# Patient Record
Sex: FEMALE | Race: WHITE | NOT HISPANIC OR LATINO
[De-identification: names, ages, dates, MRNs, and addresses within clinical notes are randomized per-mention and may not be internally consistent; named-entity substitution may affect disease eponyms.]

---

## 2017-09-12 DIAGNOSIS — Z86.79 PERSONAL HISTORY OF OTHER DISEASES OF THE CIRCULATORY SYSTEM: ICD-10-CM

## 2017-09-12 DIAGNOSIS — Z83.3 FAMILY HISTORY OF DIABETES MELLITUS: ICD-10-CM

## 2017-09-12 DIAGNOSIS — G40.409 OTHER GENERALIZED EPILEPSY AND EPILEPTIC SYNDROMES, NOT INTRACTABLE, W/OUT STATUS EPILEPTICUS: ICD-10-CM

## 2017-09-12 DIAGNOSIS — Z86.39 PERSONAL HISTORY OF OTHER ENDOCRINE, NUTRITIONAL AND METABOLIC DISEASE: ICD-10-CM

## 2017-09-12 DIAGNOSIS — Z56.0 UNEMPLOYMENT, UNSPECIFIED: ICD-10-CM

## 2017-09-12 DIAGNOSIS — F17.200 NICOTINE DEPENDENCE, UNSPECIFIED, UNCOMPLICATED: ICD-10-CM

## 2017-09-12 SDOH — ECONOMIC STABILITY - INCOME SECURITY: UNEMPLOYMENT, UNSPECIFIED: Z56.0

## 2017-09-13 ENCOUNTER — APPOINTMENT (OUTPATIENT)
Dept: ELECTROPHYSIOLOGY | Facility: CLINIC | Age: 37
End: 2017-09-13
Payer: MEDICAID

## 2017-09-13 VITALS
SYSTOLIC BLOOD PRESSURE: 118 MMHG | WEIGHT: 172 LBS | HEIGHT: 59 IN | DIASTOLIC BLOOD PRESSURE: 72 MMHG | BODY MASS INDEX: 34.68 KG/M2

## 2017-09-13 PROCEDURE — 99215 OFFICE O/P EST HI 40 MIN: CPT

## 2017-09-13 PROCEDURE — 93000 ELECTROCARDIOGRAM COMPLETE: CPT

## 2017-12-19 ENCOUNTER — APPOINTMENT (OUTPATIENT)
Dept: MRI IMAGING | Facility: CLINIC | Age: 37
End: 2017-12-19

## 2017-12-21 DIAGNOSIS — Z83.49 FAMILY HISTORY OF OTHER ENDOCRINE, NUTRITIONAL AND METABOLIC DISEASES: ICD-10-CM

## 2017-12-27 ENCOUNTER — APPOINTMENT (OUTPATIENT)
Dept: ELECTROPHYSIOLOGY | Facility: CLINIC | Age: 37
End: 2017-12-27
Payer: MEDICAID

## 2017-12-27 VITALS
BODY MASS INDEX: 34.34 KG/M2 | DIASTOLIC BLOOD PRESSURE: 80 MMHG | WEIGHT: 170 LBS | SYSTOLIC BLOOD PRESSURE: 100 MMHG | HEART RATE: 54 BPM

## 2017-12-27 PROCEDURE — 93290 INTERROG DEV EVAL ICPMS IP: CPT

## 2017-12-27 PROCEDURE — 99215 OFFICE O/P EST HI 40 MIN: CPT | Mod: 25

## 2017-12-27 PROCEDURE — 93000 ELECTROCARDIOGRAM COMPLETE: CPT

## 2018-04-17 ENCOUNTER — OUTPATIENT (OUTPATIENT)
Dept: OUTPATIENT SERVICES | Facility: HOSPITAL | Age: 38
LOS: 1 days | End: 2018-04-17
Payer: MEDICAID

## 2018-04-17 VITALS
HEIGHT: 59 IN | SYSTOLIC BLOOD PRESSURE: 125 MMHG | DIASTOLIC BLOOD PRESSURE: 59 MMHG | WEIGHT: 167.99 LBS | TEMPERATURE: 98 F | RESPIRATION RATE: 18 BRPM | HEART RATE: 80 BPM | OXYGEN SATURATION: 95 %

## 2018-04-17 DIAGNOSIS — Z98.891 HISTORY OF UTERINE SCAR FROM PREVIOUS SURGERY: Chronic | ICD-10-CM

## 2018-04-17 DIAGNOSIS — Z01.810 ENCOUNTER FOR PREPROCEDURAL CARDIOVASCULAR EXAMINATION: ICD-10-CM

## 2018-04-17 LAB
ANION GAP SERPL CALC-SCNC: 10 MMOL/L — SIGNIFICANT CHANGE UP (ref 5–17)
APTT BLD: 30.9 SEC — SIGNIFICANT CHANGE UP (ref 27.5–37.4)
BLD GP AB SCN SERPL QL: SIGNIFICANT CHANGE UP
BUN SERPL-MCNC: 13 MG/DL — SIGNIFICANT CHANGE UP (ref 8–20)
CALCIUM SERPL-MCNC: 8.6 MG/DL — SIGNIFICANT CHANGE UP (ref 8.6–10.2)
CHLORIDE SERPL-SCNC: 102 MMOL/L — SIGNIFICANT CHANGE UP (ref 98–107)
CO2 SERPL-SCNC: 25 MMOL/L — SIGNIFICANT CHANGE UP (ref 22–29)
CREAT SERPL-MCNC: 0.6 MG/DL — SIGNIFICANT CHANGE UP (ref 0.5–1.3)
GLUCOSE SERPL-MCNC: 88 MG/DL — SIGNIFICANT CHANGE UP (ref 70–115)
HCT VFR BLD CALC: 39.2 % — SIGNIFICANT CHANGE UP (ref 37–47)
HGB BLD-MCNC: 13.2 G/DL — SIGNIFICANT CHANGE UP (ref 12–16)
INR BLD: 1.18 RATIO — HIGH (ref 0.88–1.16)
MAGNESIUM SERPL-MCNC: 1.9 MG/DL — SIGNIFICANT CHANGE UP (ref 1.6–2.6)
MCHC RBC-ENTMCNC: 29.9 PG — SIGNIFICANT CHANGE UP (ref 27–31)
MCHC RBC-ENTMCNC: 33.7 G/DL — SIGNIFICANT CHANGE UP (ref 32–36)
MCV RBC AUTO: 88.7 FL — SIGNIFICANT CHANGE UP (ref 81–99)
PLATELET # BLD AUTO: 150 K/UL — SIGNIFICANT CHANGE UP (ref 150–400)
POTASSIUM SERPL-MCNC: 3.8 MMOL/L — SIGNIFICANT CHANGE UP (ref 3.5–5.3)
POTASSIUM SERPL-SCNC: 3.8 MMOL/L — SIGNIFICANT CHANGE UP (ref 3.5–5.3)
PROTHROM AB SERPL-ACNC: 13 SEC — HIGH (ref 9.8–12.7)
RBC # BLD: 4.42 M/UL — SIGNIFICANT CHANGE UP (ref 4.4–5.2)
RBC # FLD: 13.7 % — SIGNIFICANT CHANGE UP (ref 11–15.6)
SODIUM SERPL-SCNC: 137 MMOL/L — SIGNIFICANT CHANGE UP (ref 135–145)
TYPE + AB SCN PNL BLD: SIGNIFICANT CHANGE UP
WBC # BLD: 8.6 K/UL — SIGNIFICANT CHANGE UP (ref 4.8–10.8)
WBC # FLD AUTO: 8.6 K/UL — SIGNIFICANT CHANGE UP (ref 4.8–10.8)

## 2018-04-17 PROCEDURE — 80048 BASIC METABOLIC PNL TOTAL CA: CPT

## 2018-04-17 PROCEDURE — G0463: CPT

## 2018-04-17 PROCEDURE — 36415 COLL VENOUS BLD VENIPUNCTURE: CPT

## 2018-04-17 PROCEDURE — 93005 ELECTROCARDIOGRAM TRACING: CPT

## 2018-04-17 PROCEDURE — 86901 BLOOD TYPING SEROLOGIC RH(D): CPT

## 2018-04-17 PROCEDURE — 86900 BLOOD TYPING SEROLOGIC ABO: CPT

## 2018-04-17 PROCEDURE — 85027 COMPLETE CBC AUTOMATED: CPT

## 2018-04-17 PROCEDURE — 93010 ELECTROCARDIOGRAM REPORT: CPT

## 2018-04-17 PROCEDURE — 85730 THROMBOPLASTIN TIME PARTIAL: CPT

## 2018-04-17 PROCEDURE — 86850 RBC ANTIBODY SCREEN: CPT

## 2018-04-17 PROCEDURE — 85610 PROTHROMBIN TIME: CPT

## 2018-04-17 PROCEDURE — 83735 ASSAY OF MAGNESIUM: CPT

## 2018-04-17 NOTE — H&P PST ADULT - NEGATIVE CARDIOVASCULAR SYMPTOMS
no paroxysmal nocturnal dyspnea/no chest pain/no peripheral edema/no dyspnea on exertion/no orthopnea/no palpitations no orthopnea/no paroxysmal nocturnal dyspnea/no peripheral edema/no chest pain/no dyspnea on exertion

## 2018-04-17 NOTE — H&P PST ADULT - ASSESSMENT
37 year old woman with history of obesity, HLD, hypertrophic cardiomyopathy (HCM) with outflow tract obstruction, presenting for followup of palpitation and NSVT. On ILR she has had increasingly frequent episodes of NSVT, and she does have a concerning family history. She has not had sustained VT or syncope, and is at intermediate risk for sudden death related to HCM. Plan for SICD implant. 37 year old woman with history of obesity, HLD, hypertrophic cardiomyopathy (HCM) with outflow tract obstruction, presenting for followup of palpitation and NSVT. On ILR she has had increasingly frequent episodes of NSVT, and she does have a concerning family history. She has not had sustained VT or syncope, and is at intermediate risk for sudden death related to HCM.     - Patient screened in for SICD implant. Passed in 2 out of 3 vector  - NPO post midnight day of the procedure.   - Plan for simultaneous loop recorder explant.

## 2018-04-17 NOTE — H&P PST ADULT - FAMILY HISTORY
Mother  Still living? Unknown  Family history of diabetes mellitus, Age at diagnosis: Age Unknown     Father  Still living? No  Family history of heart disease, Age at diagnosis: Age Unknown

## 2018-04-17 NOTE — H&P PST ADULT - HISTORY OF PRESENT ILLNESS
37 year old woman with history of obesity, HLD, hypertrophic cardiomyopathy (HCM) with outflow tract obstruction, presenting for followup of palpitation and NSVT. An Echo in 2013 was consistent with IHSS with significant gradient with valsalva. Last septal thickness reported 1.3cm. She has been treated with beta blockers and has had some improvement. Event monitor in 2014 was normal. She has continued to have palpitation and SOB, as well as episodes of positional dizziness. She underwent ILR implant on 2/24/16, which has recently revealed multiple episodes of NSVT. On recent interrogation the NSVT has been longer lasting, up to 12 beats with cycle length 300ms. This month she has had 2 episodes of rapid NSVT on 12/17 and 12/19 lasting 9 and 12 beats. She notes frequent palpitation, but denies sudden lightheadedness, or syncope. She attempted to have a cardiac MRI, but has not been able to tolerate it. She had a panic attack in the scanner despite sedation. She now says she will only be able to have the scan with anesthesia. She denies any history of syncope, or prior sustained cardiac arrhythmias. She does have two family members with HCM, and her cousin with HCM had a history of syncope and underwent ICD implant. Another uncle had sudden death in his 50s, but apparently autopsy revealed acute MI.   She is very concerned about her risk of sudden death particularly given her young 5 year son.    Stress Test: 4/17/15, normal. Nml EF and wall motion. BP increased from 108//80.   Echo: 7/10/17, LVEF 72%, asymmetric septal hypertrophy c/w HCM with outflow tract obstruction at rest (88mmHg up to 100mmHg post Valsalva) LA 4.4cm, ANGE with mild –mod eccentric MR, trace TR

## 2018-04-20 ENCOUNTER — INPATIENT (INPATIENT)
Facility: HOSPITAL | Age: 38
LOS: 0 days | Discharge: HOME | DRG: 245 | End: 2018-04-21
Attending: STUDENT IN AN ORGANIZED HEALTH CARE EDUCATION/TRAINING PROGRAM | Admitting: STUDENT IN AN ORGANIZED HEALTH CARE EDUCATION/TRAINING PROGRAM
Payer: MEDICAID

## 2018-04-20 ENCOUNTER — TRANSCRIPTION ENCOUNTER (OUTPATIENT)
Age: 38
End: 2018-04-20

## 2018-04-20 VITALS
OXYGEN SATURATION: 97 % | DIASTOLIC BLOOD PRESSURE: 57 MMHG | TEMPERATURE: 98 F | SYSTOLIC BLOOD PRESSURE: 100 MMHG | HEART RATE: 64 BPM | RESPIRATION RATE: 16 BRPM

## 2018-04-20 DIAGNOSIS — I42.2 OTHER HYPERTROPHIC CARDIOMYOPATHY: ICD-10-CM

## 2018-04-20 DIAGNOSIS — Z01.810 ENCOUNTER FOR PREPROCEDURAL CARDIOVASCULAR EXAMINATION: ICD-10-CM

## 2018-04-20 DIAGNOSIS — Z98.891 HISTORY OF UTERINE SCAR FROM PREVIOUS SURGERY: Chronic | ICD-10-CM

## 2018-04-20 LAB
ABO RH CONFIRMATION: SIGNIFICANT CHANGE UP
HCG SERPL-ACNC: <5 MIU/ML — SIGNIFICANT CHANGE UP

## 2018-04-20 PROCEDURE — 33270 INS/REP SUBQ DEFIBRILLATOR: CPT | Mod: Q0

## 2018-04-20 PROCEDURE — 33284: CPT

## 2018-04-20 PROCEDURE — 93010 ELECTROCARDIOGRAM REPORT: CPT

## 2018-04-20 RX ORDER — KETOROLAC TROMETHAMINE 30 MG/ML
30 SYRINGE (ML) INJECTION ONCE
Qty: 0 | Refills: 0 | Status: DISCONTINUED | OUTPATIENT
Start: 2018-04-20 | End: 2018-04-21

## 2018-04-20 RX ORDER — CEFAZOLIN SODIUM 1 G
2000 VIAL (EA) INJECTION EVERY 8 HOURS
Qty: 0 | Refills: 0 | Status: COMPLETED | OUTPATIENT
Start: 2018-04-20 | End: 2018-04-21

## 2018-04-20 RX ORDER — FENTANYL CITRATE 50 UG/ML
25 INJECTION INTRAVENOUS
Qty: 0 | Refills: 0 | Status: DISCONTINUED | OUTPATIENT
Start: 2018-04-20 | End: 2018-04-21

## 2018-04-20 RX ORDER — METOPROLOL TARTRATE 50 MG
100 TABLET ORAL
Qty: 0 | Refills: 0 | Status: DISCONTINUED | OUTPATIENT
Start: 2018-04-20 | End: 2018-04-21

## 2018-04-20 RX ORDER — BENZOCAINE AND MENTHOL 5; 1 G/100ML; G/100ML
1 LIQUID ORAL
Qty: 0 | Refills: 0 | Status: DISCONTINUED | OUTPATIENT
Start: 2018-04-20 | End: 2018-04-21

## 2018-04-20 RX ORDER — TRAMADOL HYDROCHLORIDE 50 MG/1
50 TABLET ORAL EVERY 6 HOURS
Qty: 0 | Refills: 0 | Status: DISCONTINUED | OUTPATIENT
Start: 2018-04-20 | End: 2018-04-21

## 2018-04-20 RX ORDER — SIMVASTATIN 20 MG/1
20 TABLET, FILM COATED ORAL AT BEDTIME
Qty: 0 | Refills: 0 | Status: DISCONTINUED | OUTPATIENT
Start: 2018-04-20 | End: 2018-04-21

## 2018-04-20 RX ORDER — IBUPROFEN 200 MG
800 TABLET ORAL EVERY 6 HOURS
Qty: 0 | Refills: 0 | Status: DISCONTINUED | OUTPATIENT
Start: 2018-04-20 | End: 2018-04-21

## 2018-04-20 RX ORDER — KETOROLAC TROMETHAMINE 30 MG/ML
30 SYRINGE (ML) INJECTION EVERY 8 HOURS
Qty: 0 | Refills: 0 | Status: DISCONTINUED | OUTPATIENT
Start: 2018-04-20 | End: 2018-04-20

## 2018-04-20 RX ORDER — TRAMADOL HYDROCHLORIDE 50 MG/1
25 TABLET ORAL EVERY 4 HOURS
Qty: 0 | Refills: 0 | Status: DISCONTINUED | OUTPATIENT
Start: 2018-04-20 | End: 2018-04-21

## 2018-04-20 RX ORDER — ALPRAZOLAM 0.25 MG
0.25 TABLET ORAL EVERY 6 HOURS
Qty: 0 | Refills: 0 | Status: DISCONTINUED | OUTPATIENT
Start: 2018-04-20 | End: 2018-04-21

## 2018-04-20 RX ORDER — ACETAMINOPHEN 500 MG
650 TABLET ORAL EVERY 6 HOURS
Qty: 0 | Refills: 0 | Status: DISCONTINUED | OUTPATIENT
Start: 2018-04-20 | End: 2018-04-21

## 2018-04-20 RX ORDER — ONDANSETRON 8 MG/1
4 TABLET, FILM COATED ORAL EVERY 4 HOURS
Qty: 0 | Refills: 0 | Status: DISCONTINUED | OUTPATIENT
Start: 2018-04-20 | End: 2018-04-21

## 2018-04-20 RX ADMIN — SIMVASTATIN 20 MILLIGRAM(S): 20 TABLET, FILM COATED ORAL at 21:47

## 2018-04-20 RX ADMIN — Medication 800 MILLIGRAM(S): at 22:50

## 2018-04-20 RX ADMIN — ONDANSETRON 4 MILLIGRAM(S): 8 TABLET, FILM COATED ORAL at 18:00

## 2018-04-20 RX ADMIN — Medication 100 MILLIGRAM(S): at 21:47

## 2018-04-20 RX ADMIN — Medication 800 MILLIGRAM(S): at 21:49

## 2018-04-20 NOTE — PROGRESS NOTE ADULT - SUBJECTIVE AND OBJECTIVE BOX
Admission Criteria  Please admit the patient to the following service: CARDIOLOGY  Major Criteria:  - Continuous EKG monitoring is required for condition causing arrhythmia (hyperkalemia, etc)  - Significant volume load > 200 ml  - Pain management  Admit to: 3GUL     Patient is being admitted to the inpatient service due to high risk characteristics and need for further management/monitoring and is considered to be at a significantly increased risk of major adverse cardiac and vascular events if discharged.

## 2018-04-20 NOTE — DISCHARGE NOTE ADULT - INSTRUCTIONS
Choose lean meats and poultry without skin and prepare them without added saturated and trans fat.  Eat fish at least twice a week. Recent research shows that eating oily fish containing omega-3 fatty acids (for example, salmon, trout and herring) may help lower your risk of death from coronary artery disease.  Select fat-free, 1 percent fat and low-fat dairy products.  Cut back on foods containing partially hydrogenated vegetable oils to reduce trans fat in your diet.   To lower cholesterol, reduce saturated fat to no more than 5 to 6 percent of total calories. For someone eating 2,000 calories a day, that’s about 13 grams of saturated fat.  Cut back on beverages and foods with added sugars.  Choose and prepare foods with little or no salt. To lower blood pressure, aim to eat no more than 2,400 milligrams of sodium per day. Reducing daily intake to 1,500 mg is desirable because it can lower blood pressure even further.  If you drink alcohol, drink in moderation. That means one drink per day if you’re a woman and two drinks  per day if you’re a man.  Follow the American Heart Association recommendations when you eat out, and keep an eye on your portion sizes. monitor incision sites for signs of infection

## 2018-04-20 NOTE — DISCHARGE NOTE ADULT - HOSPITAL COURSE
37 year old woman with history of obesity, HLD, hypertrophic cardiomyopathy (HCM) with outflow tract obstruction status post ILR, which has demonstrated increasingly frequent episodes of NSVT, as well as a concerning family history. She presented electively and underwent uncomplicated primary prevention subcutaneous ICD implant.

## 2018-04-20 NOTE — DISCHARGE NOTE ADULT - PROVIDER TOKENS
FREE:[LAST:[Shane],FIRST:[Junior],PHONE:[phone]],FAX:[(   )    -],ADDRESS:[[place] [address]]]

## 2018-04-20 NOTE — DISCHARGE NOTE ADULT - PATIENT PORTAL LINK FT
You can access the [facility] Patient Portal, offered by [facility], by registering with the following website: [email or web address]

## 2018-04-20 NOTE — PROGRESS NOTE ADULT - SUBJECTIVE AND OBJECTIVE BOX
PROCEDURE(S): [employer] Subcutaneous ICD Implant    ELECTRPHYSIOLOGIST(S): Junior Lynn MD    COMPLICATIONS:  none          DISPOSITION:  Observation Unit           CONDITION: Stable      37 year old woman with history of obesity, HLD, hypertrophic cardiomyopathy (HCM) with outflow tract obstruction status post ILR, which has demonstrated increasingly frequent episodes of NSVT, as well as a concerning family history. She presented electively and underwent uncomplicated primary prevention [employer] subcutaneous ICD implant.     Plan:   Bedrest x 4 hours post implant, then OOB w/ assist & progress as tolerated.    Continued observation on telemetry overnight.   Cont Ancef 2gm IV q 8 hours x 2 additional doses to complete 24 hour course.   Pain control with PO analgesia PRN.   NO HEPARIN OR LOVENOX, INCLUDING PROPHYLACTIC/SUBCUT DOSING, UNTIL OTHERWISE ADVISED BY EP.   Resume home medications.   PA/Lat CXR and device check in AM.   Pending status overnight, anticipate d/c home tomorrow with outpt f/up in 1-2 weeks.

## 2018-04-20 NOTE — DISCHARGE NOTE ADULT - MEDICATION SUMMARY - MEDICATIONS TO TAKE
I will START or STAY ON the medications listed below when I get home from the hospital:    simvastatin 20 mg oral tablet  -- 1 tab(s) by mouth once a day (at bedtime)  -- Indication: For Hyperlipidemia    metoprolol succinate 100 mg oral tablet, extended release  -- 1 tab(s) by mouth 2 times a day  -- Indication: For Hypertension

## 2018-04-20 NOTE — DISCHARGE NOTE ADULT - PLAN OF CARE
optimize cardiac health Cardiac Device Implant Post Operative Instructions  - Bruising around the implant site or over the chest, side or arm near the incisions is normal, and will take a few weeks to resolve.  - Do not push, pull or lift anything heavier than 10 lbs (about a gallon of milk) with the affected arm for 4 weeks.     - Do not touch the incisions until they are completely healed.   - There are Steristrips (white strips of tape) on your incisions, which will start to peal off on their own over the next 2-3 weeks. Do not pick at or peal off the Steristrips.   - Do not apply soaps, creams, lotions, ointments or powders to the incision until it is completely healed.  You should call the doctor if:   - you notice redness, drainage, swelling, increased tenderness, hot sensation around the  incisions, bleeding or incision edges pulling apart.  - your temperature is greater than 100 degress F for more than 24 hours.  - you notice swelling or bulging at the incisions or around the device that was not there when you left the hospital or is increasing in size.  -you experience increased difficulty breathing.  - you notice new/worsening swelling in your legs and ankles.  - you faint or have dizzy spells.  -you have any questions or concerns regarding your device or the procedure.

## 2018-04-20 NOTE — DISCHARGE NOTE ADULT - CARE PROVIDER_API CALL
Junior Lynn  [facility]  [address] [place] [address]  Phone: [phone]  Fax: (       -

## 2018-04-20 NOTE — DISCHARGE NOTE ADULT - CARE PLAN
Principal Discharge DX:	Hypertrophic cardiomyopathy  Goal:	optimize cardiac health  Assessment and plan of treatment:	Cardiac Device Implant Post Operative Instructions  - Bruising around the implant site or over the chest, side or arm near the incisions is normal, and will take a few weeks to resolve.  - Do not push, pull or lift anything heavier than 10 lbs (about a gallon of milk) with the affected arm for 4 weeks.     - Do not touch the incisions until they are completely healed.   - There are Steristrips (white strips of tape) on your incisions, which will start to peal off on their own over the next 2-3 weeks. Do not pick at or peal off the Steristrips.   - Do not apply soaps, creams, lotions, ointments or powders to the incision until it is completely healed.  You should call the doctor if:   - you notice redness, drainage, swelling, increased tenderness, hot sensation around the  incisions, bleeding or incision edges pulling apart.  - your temperature is greater than 100 degress F for more than 24 hours.  - you notice swelling or bulging at the incisions or around the device that was not there when you left the hospital or is increasing in size.  -you experience increased difficulty breathing.  - you notice new/worsening swelling in your legs and ankles.  - you faint or have dizzy spells.  -you have any questions or concerns regarding your device or the procedure.

## 2018-04-20 NOTE — DISCHARGE NOTE ADULT - ADDITIONAL INSTRUCTIONS
Follow up with Dr. Lynn at [facility] in 2-3 weeks. Please call [phone] to schedule an appointment.

## 2018-04-21 VITALS — RESPIRATION RATE: 16 BRPM | DIASTOLIC BLOOD PRESSURE: 71 MMHG | SYSTOLIC BLOOD PRESSURE: 116 MMHG | HEART RATE: 71 BPM

## 2018-04-21 LAB
ANION GAP SERPL CALC-SCNC: 12 MMOL/L — SIGNIFICANT CHANGE UP (ref 5–17)
BUN SERPL-MCNC: 14 MG/DL — SIGNIFICANT CHANGE UP (ref 8–20)
CALCIUM SERPL-MCNC: 8 MG/DL — LOW (ref 8.6–10.2)
CHLORIDE SERPL-SCNC: 107 MMOL/L — SIGNIFICANT CHANGE UP (ref 98–107)
CO2 SERPL-SCNC: 20 MMOL/L — LOW (ref 22–29)
CREAT SERPL-MCNC: 0.56 MG/DL — SIGNIFICANT CHANGE UP (ref 0.5–1.3)
GLUCOSE SERPL-MCNC: 89 MG/DL — SIGNIFICANT CHANGE UP (ref 70–115)
HCT VFR BLD CALC: 34.1 % — LOW (ref 37–47)
HGB BLD-MCNC: 11.1 G/DL — LOW (ref 12–16)
MAGNESIUM SERPL-MCNC: 2 MG/DL — SIGNIFICANT CHANGE UP (ref 1.8–2.6)
MCHC RBC-ENTMCNC: 29.1 PG — SIGNIFICANT CHANGE UP (ref 27–31)
MCHC RBC-ENTMCNC: 32.6 G/DL — SIGNIFICANT CHANGE UP (ref 32–36)
MCV RBC AUTO: 89.3 FL — SIGNIFICANT CHANGE UP (ref 81–99)
PLATELET # BLD AUTO: 123 K/UL — LOW (ref 150–400)
POTASSIUM SERPL-MCNC: 4 MMOL/L — SIGNIFICANT CHANGE UP (ref 3.5–5.3)
POTASSIUM SERPL-SCNC: 4 MMOL/L — SIGNIFICANT CHANGE UP (ref 3.5–5.3)
RBC # BLD: 3.82 M/UL — LOW (ref 4.4–5.2)
RBC # FLD: 14.2 % — SIGNIFICANT CHANGE UP (ref 11–15.6)
SODIUM SERPL-SCNC: 139 MMOL/L — SIGNIFICANT CHANGE UP (ref 135–145)
WBC # BLD: 9.8 K/UL — SIGNIFICANT CHANGE UP (ref 4.8–10.8)
WBC # FLD AUTO: 9.8 K/UL — SIGNIFICANT CHANGE UP (ref 4.8–10.8)

## 2018-04-21 PROCEDURE — 71046 X-RAY EXAM CHEST 2 VIEWS: CPT

## 2018-04-21 PROCEDURE — 84702 CHORIONIC GONADOTROPIN TEST: CPT

## 2018-04-21 PROCEDURE — 83735 ASSAY OF MAGNESIUM: CPT

## 2018-04-21 PROCEDURE — 99261: CPT

## 2018-04-21 PROCEDURE — 80048 BASIC METABOLIC PNL TOTAL CA: CPT

## 2018-04-21 PROCEDURE — C1894: CPT

## 2018-04-21 PROCEDURE — 85027 COMPLETE CBC AUTOMATED: CPT

## 2018-04-21 PROCEDURE — C1896: CPT

## 2018-04-21 PROCEDURE — C1766: CPT

## 2018-04-21 PROCEDURE — 93010 ELECTROCARDIOGRAM REPORT: CPT

## 2018-04-21 PROCEDURE — C1732: CPT

## 2018-04-21 PROCEDURE — C1731: CPT

## 2018-04-21 PROCEDURE — 36415 COLL VENOUS BLD VENIPUNCTURE: CPT

## 2018-04-21 PROCEDURE — 71046 X-RAY EXAM CHEST 2 VIEWS: CPT | Mod: 26

## 2018-04-21 PROCEDURE — 93005 ELECTROCARDIOGRAM TRACING: CPT

## 2018-04-21 PROCEDURE — C1722: CPT

## 2018-04-21 RX ADMIN — Medication 800 MILLIGRAM(S): at 10:39

## 2018-04-21 RX ADMIN — Medication 800 MILLIGRAM(S): at 08:34

## 2018-04-21 RX ADMIN — Medication 100 MILLIGRAM(S): at 00:19

## 2018-04-21 RX ADMIN — Medication 100 MILLIGRAM(S): at 07:02

## 2018-04-21 NOTE — PROGRESS NOTE ADULT - ASSESSMENT
Procedure: Implantation of a subcutaneous ICD  Pre-op diagnosis: Hypertrophic cardiomyopathy (HCM) with outflow tract obstruction, NSVT  Post-op diagnosis: Hypertrophic cardiomyopathy (HCM) with outflow tract obstruction, NSVT    1. Discharge home today    2. Continue current medications    3.  Leave Steri-strips in place    4. Follow up at [facility] in 2 weeks for wound check and ICD/pacemaker test.

## 2018-04-21 NOTE — PROGRESS NOTE ADULT - SUBJECTIVE AND OBJECTIVE BOX
Subjective:   37y Female S/P implantation of a subcutaneous ICD    Patient is a 37y old  Female who presents with a chief complaint of Elective subcutaneous ICD implant (2018 15:53)    HPI: 37 year old woman with history of obesity, HLD, hypertrophic cardiomyopathy (HCM) with outflow tract obstruction, presenting for followup of palpitation and NSVT. On ILR she has had increasingly frequent episodes of NSVT, and she does have a concerning family history. She has not had sustained VT or syncope, and is at intermediate risk for sudden death related to HCM.     PAST MEDICAL & SURGICAL HISTORY:  Palpitations  Hyperlipidemia  Preeclampsia  Seizure disorder  H/O:     acetaminophen   Tablet. 650 milliGRAM(s) Oral every 6 hours PRN  ALPRAZolam 0.25 milliGRAM(s) Oral every 6 hours PRN  aluminum hydroxide/magnesium hydroxide/simethicone Suspension 30 milliLiter(s) Oral every 4 hours PRN  benzocaine 15 mG/menthol 3.6 mG Lozenge 1 Lozenge Oral every 2 hours PRN  fentaNYL    Injectable 25 MICROGram(s) IV Push every 30 minutes PRN  ibuprofen  Tablet 800 milliGRAM(s) Oral every 6 hours PRN  ketorolac   Injectable 30 milliGRAM(s) IV Push once PRN  metoprolol succinate  milliGRAM(s) Oral two times a day  ondansetron Injectable 4 milliGRAM(s) IV Push every 4 hours PRN  simvastatin 20 milliGRAM(s) Oral at bedtime  traMADol 25 milliGRAM(s) Oral every 4 hours PRN  traMADol 50 milliGRAM(s) Oral every 6 hours PRN    Allergies:   codeine (Vomiting)  predniSONE (Other)  sulfa drugs (Seizure)    General: No fatigue, no fevers/chills  Respiratory: No dyspnea, no cough, no wheeze  CV: No chest pain, no palpitations  Abd: No nausea    Neuro: No headache, no dizziness    Objective:  T(C): 36.9 (18 @ 12:15), Max: 36.9 (18 @ 12:15)  HR: 65 (18 @ 05:46) (64 - 81)  BP: 98/52 (18 @ 05:46) (98/52 - 124/61)  RR: 18 (18 @ 05:46) (14 - 18)  SpO2: 96% (18 @ 21:54) (96% - 98%)    CM: SR with no significant arrhythmia or event  Gen: Awake, alert, note acute distress  Neuro: A&OX3  Chest: CTA, S1, S2, no murmur, RRR, left midaxillary and mid chest dressings removed and Steri-strips clean, dry, intact, no edema  Abd: Soft  Extremity: No edema  EKG: NSR with nonspecific ST and T wave abnormality  CXR: No pneumo/hemothorax, good device and lead placement  Labs:                        11.1   9.8   )-----------( 123      ( 2018 06:03 )             34.1     04-    139  |  107  |  14.0  ----------------------<  89  4.0   |  20.0  |  0.56    Ca    8.0      2018 06:03  Mg     2.0     -

## 2018-05-02 ENCOUNTER — APPOINTMENT (OUTPATIENT)
Dept: ELECTROPHYSIOLOGY | Facility: CLINIC | Age: 38
End: 2018-05-02
Payer: MEDICAID

## 2018-05-02 VITALS — DIASTOLIC BLOOD PRESSURE: 58 MMHG | WEIGHT: 168 LBS | BODY MASS INDEX: 33.93 KG/M2 | SYSTOLIC BLOOD PRESSURE: 98 MMHG

## 2018-05-02 PROCEDURE — 99024 POSTOP FOLLOW-UP VISIT: CPT

## 2018-05-02 PROCEDURE — 93000 ELECTROCARDIOGRAM COMPLETE: CPT | Mod: 59

## 2018-05-02 PROCEDURE — 93282 PRGRMG EVAL IMPLANTABLE DFB: CPT

## 2018-06-13 ENCOUNTER — APPOINTMENT (OUTPATIENT)
Dept: ELECTROPHYSIOLOGY | Facility: CLINIC | Age: 38
End: 2018-06-13
Payer: MEDICAID

## 2018-06-13 VITALS
HEART RATE: 61 BPM | BODY MASS INDEX: 34.13 KG/M2 | DIASTOLIC BLOOD PRESSURE: 60 MMHG | WEIGHT: 169 LBS | SYSTOLIC BLOOD PRESSURE: 100 MMHG

## 2018-06-13 PROCEDURE — 93282 PRGRMG EVAL IMPLANTABLE DFB: CPT

## 2018-06-13 PROCEDURE — 93000 ELECTROCARDIOGRAM COMPLETE: CPT | Mod: 59

## 2018-09-05 ENCOUNTER — APPOINTMENT (OUTPATIENT)
Dept: ELECTROPHYSIOLOGY | Facility: CLINIC | Age: 38
End: 2018-09-05
Payer: MEDICAID

## 2018-09-05 VITALS
HEART RATE: 70 BPM | DIASTOLIC BLOOD PRESSURE: 60 MMHG | WEIGHT: 171 LBS | BODY MASS INDEX: 34.54 KG/M2 | SYSTOLIC BLOOD PRESSURE: 100 MMHG

## 2018-09-05 PROCEDURE — 99215 OFFICE O/P EST HI 40 MIN: CPT

## 2018-09-05 PROCEDURE — 93000 ELECTROCARDIOGRAM COMPLETE: CPT | Mod: 59

## 2018-09-05 PROCEDURE — 93282 PRGRMG EVAL IMPLANTABLE DFB: CPT

## 2018-09-12 ENCOUNTER — APPOINTMENT (OUTPATIENT)
Dept: GASTROENTEROLOGY | Facility: CLINIC | Age: 38
End: 2018-09-12
Payer: MEDICAID

## 2018-09-12 VITALS
HEIGHT: 59 IN | WEIGHT: 171 LBS | HEART RATE: 90 BPM | RESPIRATION RATE: 14 BRPM | OXYGEN SATURATION: 98 % | SYSTOLIC BLOOD PRESSURE: 122 MMHG | BODY MASS INDEX: 34.47 KG/M2 | DIASTOLIC BLOOD PRESSURE: 74 MMHG

## 2018-09-12 DIAGNOSIS — R13.10 DYSPHAGIA, UNSPECIFIED: ICD-10-CM

## 2018-09-12 PROCEDURE — 99204 OFFICE O/P NEW MOD 45 MIN: CPT

## 2018-09-13 PROBLEM — R13.10 DYSPHAGIA: Status: ACTIVE | Noted: 2018-09-13

## 2018-10-16 ENCOUNTER — TRANSCRIPTION ENCOUNTER (OUTPATIENT)
Age: 38
End: 2018-10-16

## 2018-10-17 ENCOUNTER — OUTPATIENT (OUTPATIENT)
Dept: OUTPATIENT SERVICES | Facility: HOSPITAL | Age: 38
LOS: 1 days | End: 2018-10-17
Payer: MEDICAID

## 2018-10-17 ENCOUNTER — APPOINTMENT (OUTPATIENT)
Dept: GASTROENTEROLOGY | Facility: HOSPITAL | Age: 38
End: 2018-10-17

## 2018-10-17 ENCOUNTER — RESULT REVIEW (OUTPATIENT)
Age: 38
End: 2018-10-17

## 2018-10-17 DIAGNOSIS — R10.13 EPIGASTRIC PAIN: ICD-10-CM

## 2018-10-17 DIAGNOSIS — R12 HEARTBURN: ICD-10-CM

## 2018-10-17 DIAGNOSIS — Z98.891 HISTORY OF UTERINE SCAR FROM PREVIOUS SURGERY: Chronic | ICD-10-CM

## 2018-10-17 DIAGNOSIS — R14.0 ABDOMINAL DISTENSION (GASEOUS): ICD-10-CM

## 2018-10-17 LAB — HCG UR QL: NEGATIVE — SIGNIFICANT CHANGE UP

## 2018-10-17 PROCEDURE — 88305 TISSUE EXAM BY PATHOLOGIST: CPT

## 2018-10-17 PROCEDURE — 88313 SPECIAL STAINS GROUP 2: CPT | Mod: 26

## 2018-10-17 PROCEDURE — 81025 URINE PREGNANCY TEST: CPT

## 2018-10-17 PROCEDURE — 88312 SPECIAL STAINS GROUP 1: CPT

## 2018-10-17 PROCEDURE — 43239 EGD BIOPSY SINGLE/MULTIPLE: CPT

## 2018-10-17 PROCEDURE — 88313 SPECIAL STAINS GROUP 2: CPT

## 2018-10-17 PROCEDURE — 88312 SPECIAL STAINS GROUP 1: CPT | Mod: 26

## 2018-10-17 PROCEDURE — 88305 TISSUE EXAM BY PATHOLOGIST: CPT | Mod: 26

## 2019-05-10 ENCOUNTER — APPOINTMENT (OUTPATIENT)
Dept: ANTEPARTUM | Facility: CLINIC | Age: 39
End: 2019-05-10

## 2019-05-10 ENCOUNTER — APPOINTMENT (OUTPATIENT)
Dept: MATERNAL FETAL MEDICINE | Facility: CLINIC | Age: 39
End: 2019-05-10

## 2019-05-16 ENCOUNTER — APPOINTMENT (OUTPATIENT)
Dept: MATERNAL FETAL MEDICINE | Facility: CLINIC | Age: 39
End: 2019-05-16
Payer: MEDICAID

## 2019-05-16 ENCOUNTER — ASOB RESULT (OUTPATIENT)
Age: 39
End: 2019-05-16

## 2019-05-16 PROCEDURE — 99213 OFFICE O/P EST LOW 20 MIN: CPT

## 2019-05-17 ENCOUNTER — APPOINTMENT (OUTPATIENT)
Dept: ANTEPARTUM | Facility: CLINIC | Age: 39
End: 2019-05-17
Payer: MEDICAID

## 2019-05-17 ENCOUNTER — ASOB RESULT (OUTPATIENT)
Age: 39
End: 2019-05-17

## 2019-05-17 PROCEDURE — 36416 COLLJ CAPILLARY BLOOD SPEC: CPT

## 2019-05-17 PROCEDURE — 76813 OB US NUCHAL MEAS 1 GEST: CPT

## 2019-05-20 LAB
1ST TRIMESTER DATA: NORMAL
ADDENDUM DOC: NORMAL
AFP PNL SERPL: NORMAL
AFP SERPL-ACNC: NORMAL
CLINICAL BIOCHEMIST REVIEW: NORMAL
FREE BETA HCG 1ST TRIMESTER: NORMAL
Lab: NORMAL
NOTES NTD: NORMAL
NT: NORMAL
PAPP-A SERPL-ACNC: NORMAL
TRISOMY 18/3: NORMAL

## 2019-06-12 ENCOUNTER — APPOINTMENT (OUTPATIENT)
Dept: ANTEPARTUM | Facility: CLINIC | Age: 39
End: 2019-06-12

## 2019-06-15 LAB
1ST TRIMESTER DATA: NORMAL
2ND TRIMESTER DATA: NORMAL
ADDENDUM DOC: NORMAL
AFP PNL SERPL: NORMAL
AFP SERPL-ACNC: NORMAL
AFP SERPL-ACNC: NORMAL
B-HCG FREE SERPL-MCNC: NORMAL
CLINICAL BIOCHEMIST REVIEW: NORMAL
FREE BETA HCG 1ST TRIMESTER: NORMAL
INHIBIN A SERPL-MCNC: NORMAL
NOTES NTD: NORMAL
NT: NORMAL
PAPP-A SERPL-ACNC: NORMAL
U ESTRIOL SERPL-SCNC: NORMAL

## 2019-07-08 ENCOUNTER — ASOB RESULT (OUTPATIENT)
Age: 39
End: 2019-07-08

## 2019-07-08 ENCOUNTER — APPOINTMENT (OUTPATIENT)
Dept: ANTEPARTUM | Facility: CLINIC | Age: 39
End: 2019-07-08
Payer: MEDICAID

## 2019-07-08 PROCEDURE — 76817 TRANSVAGINAL US OBSTETRIC: CPT

## 2019-07-08 PROCEDURE — 76811 OB US DETAILED SNGL FETUS: CPT

## 2019-07-25 ENCOUNTER — APPOINTMENT (OUTPATIENT)
Dept: PEDIATRIC CARDIOLOGY | Facility: CLINIC | Age: 39
End: 2019-07-25
Payer: MEDICAID

## 2019-07-25 PROCEDURE — 93325 DOPPLER ECHO COLOR FLOW MAPG: CPT | Mod: 59

## 2019-07-25 PROCEDURE — 76821 MIDDLE CEREBRAL ARTERY ECHO: CPT

## 2019-07-25 PROCEDURE — 76827 ECHO EXAM OF FETAL HEART: CPT

## 2019-07-25 PROCEDURE — 76825 ECHO EXAM OF FETAL HEART: CPT

## 2019-07-25 PROCEDURE — 99203 OFFICE O/P NEW LOW 30 MIN: CPT | Mod: 25

## 2019-07-25 PROCEDURE — 76820 UMBILICAL ARTERY ECHO: CPT

## 2019-08-07 ENCOUNTER — ASOB RESULT (OUTPATIENT)
Age: 39
End: 2019-08-07

## 2019-08-07 ENCOUNTER — APPOINTMENT (OUTPATIENT)
Dept: ANTEPARTUM | Facility: CLINIC | Age: 39
End: 2019-08-07
Payer: MEDICAID

## 2019-08-07 PROCEDURE — 76816 OB US FOLLOW-UP PER FETUS: CPT

## 2019-08-08 ENCOUNTER — APPOINTMENT (OUTPATIENT)
Dept: GASTROENTEROLOGY | Facility: CLINIC | Age: 39
End: 2019-08-08
Payer: MEDICAID

## 2019-08-08 VITALS
HEIGHT: 59 IN | HEART RATE: 92 BPM | OXYGEN SATURATION: 98 % | DIASTOLIC BLOOD PRESSURE: 71 MMHG | RESPIRATION RATE: 16 BRPM | BODY MASS INDEX: 33.26 KG/M2 | WEIGHT: 165 LBS | SYSTOLIC BLOOD PRESSURE: 99 MMHG

## 2019-08-08 DIAGNOSIS — K21.9 GASTRO-ESOPHAGEAL REFLUX DISEASE W/OUT ESOPHAGITIS: ICD-10-CM

## 2019-08-08 PROCEDURE — 99204 OFFICE O/P NEW MOD 45 MIN: CPT

## 2019-08-08 NOTE — HISTORY OF PRESENT ILLNESS
[de-identified] : The patient is being seen for constipation, hemorrhoids, GERD\par       The patient has had constipation for about 30 years. Her constipation is severe. She has a bowel movement every 5 days. Worse with dehydration. The patient is now 20 weeks pregnant and her constipation become worse. 3 days ago she was having severe constipation, and she strained and then began with severe rectal discomfort in the next few days. She had no rectal bleeding. The patient states she's got external hemorrhoids now been a very painful and she cannot even sit. Pain is severe .no rectal bleeding.\par     The patient  has hx of AICD for Cardiomypathy.   Has history of heartburn and regurgitation for 3-4 years. For the past 3 months her heartburn and regurgitation worse. Symptoms are severe occur every day. Worse with lying down. She takes Zantac to 4 times a week if she did not know she could take the Zantac 150 mg q. day. She has never had an upper endoscopy she has no dysphagia

## 2019-08-08 NOTE — ASSESSMENT
[FreeTextEntry1] : A/P\par Large external , thrombosed hemorrhoids\par proctosol hc bid\par SITZ baths bid\par \par constipation- miralax qd\par \par GERD- zatnac 150 mg qd\par Today's instructions for acid reflux include avoid provocative foods. For example citrus alcohol coffee chocolate mints. Smaller meals, no eating 3 hours prior to bedtime and elevate head of the bed prior to sleep.\par \par

## 2019-08-08 NOTE — PHYSICAL EXAM
[General Appearance - Alert] : alert [General Appearance - Well Nourished] : well nourished [General Appearance - In No Acute Distress] : in no acute distress [General Appearance - Well Developed] : well developed [Sclera] : the sclera and conjunctiva were normal [Outer Ear] : the ears and nose were normal in appearance [Hearing Threshold Finger Rub Not [place]] : hearing was normal [Nasal Cavity] : the nasal mucosa and septum were normal [Neck Appearance] : the appearance of the neck was normal [Respiration, Rhythm And Depth] : normal respiratory rhythm and effort [Exaggerated Use Of Accessory Muscles For Inspiration] : no accessory muscle use [Auscultation Breath Sounds / Voice Sounds] : lungs were clear to auscultation bilaterally [Apical Impulse] : the apical impulse was normal [Heart Rate And Rhythm] : heart rate was normal and rhythm regular [Heart Sounds] : normal S1 and S2 [Abdomen Soft] : soft [Bowel Sounds] : normal bowel sounds [Normal Sphincter Tone] : normal sphincter tone [Abdomen Tenderness] : non-tender [No Rectal Mass] : no rectal mass [External Hemorrhoid] : external hemorrhoids [Skin Turgor] : normal skin turgor [Skin Color & Pigmentation] : normal skin color and pigmentation [Oriented To Time, Place, And Person] : oriented to person, place, and time [] : no rash [Affect] : the affect was normal [Impaired Insight] : insight and judgment were intact [Internal Hemorrhoid] : no internal hemorrhoids [FreeTextEntry1] :  There were large external hemorrhoids [Occult Blood Positive] : stool was negative for occult blood

## 2019-09-06 ENCOUNTER — APPOINTMENT (OUTPATIENT)
Dept: ANTEPARTUM | Facility: CLINIC | Age: 39
End: 2019-09-06
Payer: MEDICAID

## 2019-09-06 ENCOUNTER — ASOB RESULT (OUTPATIENT)
Age: 39
End: 2019-09-06

## 2019-09-06 PROCEDURE — 76816 OB US FOLLOW-UP PER FETUS: CPT

## 2019-09-13 ENCOUNTER — ASOB RESULT (OUTPATIENT)
Age: 39
End: 2019-09-13

## 2019-09-13 ENCOUNTER — APPOINTMENT (OUTPATIENT)
Dept: ANTEPARTUM | Facility: CLINIC | Age: 39
End: 2019-09-13
Payer: MEDICAID

## 2019-09-13 PROCEDURE — 76819 FETAL BIOPHYS PROFIL W/O NST: CPT

## 2019-09-19 ENCOUNTER — APPOINTMENT (OUTPATIENT)
Dept: GASTROENTEROLOGY | Facility: CLINIC | Age: 39
End: 2019-09-19
Payer: MEDICAID

## 2019-09-19 VITALS
DIASTOLIC BLOOD PRESSURE: 76 MMHG | SYSTOLIC BLOOD PRESSURE: 103 MMHG | BODY MASS INDEX: 34.27 KG/M2 | HEART RATE: 93 BPM | WEIGHT: 170 LBS | HEIGHT: 59 IN

## 2019-09-19 PROCEDURE — 99214 OFFICE O/P EST MOD 30 MIN: CPT

## 2019-09-19 RX ORDER — OMEPRAZOLE 40 MG/1
40 CAPSULE, DELAYED RELEASE ORAL
Qty: 90 | Refills: 0 | Status: COMPLETED | COMMUNITY
End: 2019-09-19

## 2019-09-19 NOTE — ASSESSMENT
[FreeTextEntry1] : A/P\par GERD\par Today's instructions for acid reflux include avoid provocative foods. For example citrus alcohol coffee chocolate mints. Smaller meals, no eating 3 hours prior to bedtime and elevate head of the bed prior to sleep.\par protonix 40 mg qd\par \par \par hemorrhoids- continue proctosol cream prn\par \par constipation- high fiber diet\par \par F/U in 6 weeks

## 2019-09-19 NOTE — HISTORY OF PRESENT ILLNESS
[de-identified] : Care for followup of GERD constipation and hemorrhoids. The patient is pregnant at 30 weeks gestation. He has had constipation for 30 years. Her constipation is worse since pregnancy. She has a bowel movement every 5 days. She started MiraLax 17 g q.d. but that caused liquid stool. Now she states that she drinks milk twice a day she has a nice soft bowel movement.\par     The patient has external hemorrhoids which were thrombosed. The hemorrhoids resolved with sitz baths and ProctoFoam cream. Currently she has no rectal pain or bleeding.\par     The patient has been having heartburn and regurgitation for the 2 years. She specifically gets regurgitation which is worse at night. Symptoms are severe. Symptoms lasted 45 minutes to one hour. She tried Zantac 150 mg which helped during the day but she had breakthrough symptoms after dinner. She has no dysphagia. She has no weight loss.

## 2019-09-19 NOTE — PHYSICAL EXAM
[General Appearance - Alert] : alert [General Appearance - In No Acute Distress] : in no acute distress [General Appearance - Well Nourished] : well nourished [General Appearance - Well Developed] : well developed [Sclera] : the sclera and conjunctiva were normal [Outer Ear] : the ears and nose were normal in appearance [Neck Appearance] : the appearance of the neck was normal [Neck Cervical Mass (___cm)] : no neck mass was observed [Respiration, Rhythm And Depth] : normal respiratory rhythm and effort [Exaggerated Use Of Accessory Muscles For Inspiration] : no accessory muscle use [Auscultation Breath Sounds / Voice Sounds] : lungs were clear to auscultation bilaterally [Apical Impulse] : the apical impulse was normal [Heart Rate And Rhythm] : heart rate was normal and rhythm regular [Heart Sounds] : normal S1 and S2 [Bowel Sounds] : normal bowel sounds [Abdomen Soft] : soft [Abdomen Tenderness] : non-tender [Skin Color & Pigmentation] : normal skin color and pigmentation [Skin Turgor] : normal skin turgor [] : no rash [Skin Lesions] : no skin lesions [Oriented To Time, Place, And Person] : oriented to person, place, and time [Impaired Insight] : insight and judgment were intact [Affect] : the affect was normal

## 2019-09-20 ENCOUNTER — APPOINTMENT (OUTPATIENT)
Dept: ANTEPARTUM | Facility: CLINIC | Age: 39
End: 2019-09-20
Payer: MEDICAID

## 2019-09-20 ENCOUNTER — ASOB RESULT (OUTPATIENT)
Age: 39
End: 2019-09-20

## 2019-09-20 ENCOUNTER — APPOINTMENT (OUTPATIENT)
Dept: MATERNAL FETAL MEDICINE | Facility: CLINIC | Age: 39
End: 2019-09-20
Payer: MEDICAID

## 2019-09-20 VITALS
DIASTOLIC BLOOD PRESSURE: 68 MMHG | BODY MASS INDEX: 34.52 KG/M2 | RESPIRATION RATE: 18 BRPM | HEART RATE: 76 BPM | HEIGHT: 59 IN | OXYGEN SATURATION: 98 % | WEIGHT: 171.25 LBS | SYSTOLIC BLOOD PRESSURE: 95 MMHG

## 2019-09-20 DIAGNOSIS — Z87.891 PERSONAL HISTORY OF NICOTINE DEPENDENCE: ICD-10-CM

## 2019-09-20 DIAGNOSIS — Z09 ENCOUNTER FOR FOLLOW-UP EXAMINATION AFTER COMPLETED TREATMENT FOR CONDITIONS OTHER THAN MALIGNANT NEOPLASM: ICD-10-CM

## 2019-09-20 DIAGNOSIS — R14.0 ABDOMINAL DISTENSION (GASEOUS): ICD-10-CM

## 2019-09-20 PROCEDURE — 99215 OFFICE O/P EST HI 40 MIN: CPT

## 2019-09-20 PROCEDURE — 76816 OB US FOLLOW-UP PER FETUS: CPT

## 2019-09-20 RX ORDER — LEVONORGESTREL 52 MG/1
20 INTRAUTERINE DEVICE INTRAUTERINE
Refills: 0 | Status: DISCONTINUED | COMMUNITY
End: 2019-09-20

## 2019-09-20 RX ORDER — ALBUTEROL SULFATE 90 UG/1
108 (90 BASE) AEROSOL, METERED RESPIRATORY (INHALATION) EVERY 6 HOURS
Refills: 0 | Status: DISCONTINUED | COMMUNITY
End: 2019-09-20

## 2019-09-20 NOTE — VITALS
[US Date: ___] : ultrasound performed on [unfilled]. [GA= ___ Weeks] : Results were GA of [unfilled] weeks [WOJCIECH by US (date): ___] : The calculated WOJCIECH by US is [unfilled] [LMP (date): ___] : LMP was on [unfilled] [GA= ___ Days] : and [unfilled] day(s) [GA =___ Weeks] : which calculates to a GA of [unfilled] weeks [WOJCIECH by LMP (date): ___] : The calculated WOJCIECH by LMP is [unfilled] [By LMP] : this is the final WOJCIECH

## 2019-09-20 NOTE — OB HISTORY
[Pregnancy History] : patient received anesthesia [Spontaneous] : Spontaneous conception [Sonogram] : sonogram [at ___ wks] : at [unfilled] weeks [Definite:  ___ (Date)] : the last menstrual period was [unfilled] [___] : Living: [unfilled] [LMP: ___] : LMP: [unfilled] [WOJCEICH: ___] : WOJCIECH: [unfilled] [EGA: ___ wks] : EGA: [unfilled] wks [FreeTextEntry1] : Her prenatal records were not available for my review.\par \par She had genetic counseling on May 16, 2019 due to her advanced maternal age and history of hypertrophic cardiomyopathy (HCM). 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 She declined prenatal diagnostic testing. She elected to have a first trimester screening test, sequential screen test, and noninvasive prenatal screen test. Noninvasive prenatal screen test was done on May 16, 2019 and reported a low risk for fetal chromosomal abnormalities. The fetal sex was reported to be female. First trimester screening test was done on May 17, 2019 and reported at low risk for Down syndrome and trisomy 18/13. Sequential screen testing was done on June 12, 2019 and reported a low risk for Down syndrome, trisomy 18, and open neural tube defects. The maternal inhibin A level was noted to be elevated at the 95th percentile. \par \par \par  [Normal Amount/Duration] : was abnormal [Spotting Between  Menses] : no spotting between menses [Regular Cycle Intervals] : periods have been irregular

## 2019-09-20 NOTE — FAMILY HISTORY
[Reported Family History Of Birth Defects] : no congenital heart defects [Demond-Sachs Carrier] : no Demond-Sachs [Family History] : no mental retardation/autism [Reported Family History Of Genetic Disease] : no genetic/chromosomal disorder

## 2019-09-20 NOTE — DISCUSSION/SUMMARY
[FreeTextEntry1] : She is 31 weeks and 0 days gestation by her last menstrual period dates.\par \par The Inhibin - A level was elevated at the 95th percentile during the second trimester maternal aneuploidy screen testing. I told her that elevated Inhibin - A levels have been associated with a 2.4 increased in the risk of  births less than 32 weeks gestation, fetal loss greater than 24 weeks gestation, and preeclampsia. I told her that I recommend taking a daily baby aspirin to decrease the risk of developing preeclampsia. However, her gestational age is past the recommended start for the medication and is not recommended at this time.  \par \par She told me that she was diagnosed with hypertrophic cardiomyopathy (HCM) approximately 7 years ago. She had a pacemaker implanted  approximately 2 years ago after she developed arrhythmias. She is not being anticoagulated. She is currently being treated with metoprolol 100 mg twice daily. She also takes Zocor 20 mg daily. She is currently asymptomatic while taking the metoprolol medication. She is being monitored by a cardiologist and a cardiac electrophysiologist during pregnancy.  The overall risk of disease related complications such as sudden death, end stage heart failure and fatal stroke has been reported to be approximately 1 - 2% per year. A 2014 meta-analysis of pregnancies complicated with HCM reported an overall maternal mortality of 0.5% and a complication symptomatic worsening rate of 29%.The  delivery rate was 26%. I told her that pregnancy is is likely to be well tolerated when the woman is asymptomatic like she has been. The best predictor of outcome is pre-pregnancy functional and symptomatic status. She was advised to continue taking her beta blocker medication. She was advice to have an anesthesia consultation during the third trimester to discuss analgesia/anesthesia for her delivery since vasodilation may be poorly tolerated. Hypovolemia or blood loss should be aggressively corrected during labor and delivery.\par \par I discussed the fetal exposure to her medications. I told her that Metoprolol or Toprol XL can be taken during pregnancy. There are no fetal malformations attributable to Metoprolol use during the first trimester of pregnancy. She was informed that beta-blockers have been associated with intrauterine growth restriction and, therefore, she should have serial ultrasounds during the course of the pregnancy to assess fetal growth and developmental.  Use of beta-blockers during pregnancy has also been associated with  bradycardia and hypoglycemia.  Therefore, newborns exposed to Metoprolol during pregnancy should be closely observed during the first 24 to 48 hours after birth for bradycardia and other symptoms.  The American Academy of Pediatrics considers Metoprolol to be compatible with breastfeeding.  \par \par She has been taking a statin medication (Zocor or simvastatin) during the pregnancy. I told her that ideally before becoming pregnant she should have discontinue the statin medication. I told her that statins should not be taken during pregnancy, however, she should not discontinue the statin medication without the approval of her cardiologist. I told her that the reasons for not recommending statins such as Zocor during pregnancy is the lack of demonstrated benefit of treating hyperlipidemia during pregnancy and the theoretical considerations concerning the role of cholesterol in the development of the embryo. Additionally, atherosclerosis is a process that occurs over time and discontinuing lipid lowering medications during pregnancy is not likely to adversely affect the overall outcome of hypercholesterolemia treatment. I told her that inadvertent use of Zocor during early pregnancy does not appear to increase the risk of adverse pregnancy outcomes based on a small number of human cases and experimental animal studies. I also told her that use of Zocor is not recommended during breastfeeding.\par \par I discussed today's ultrasound finding of a small for gestational age fetus at less than the 10th percentile. The amniotic fluid volume was normal. The umbilical artery S/D ratio was normal. The middle cerebral artery Doppler study was within normal limits. There was no fetal brain sparing based on the normal MCA:UA ratio. The fetus was found to be healthy with a normal biophysical profile score (8/8). At this time there is no evidence of severe placental insufficiency in view of normal amniotic fluid volume and normal umbilical artery Doppler studies. The amniotic fluid volume and umbilical artery Doppler studies being normal suggests that this fetus is most likely a normal constitutional small fetus. I told her that the fetus could be small due to the fetal exposure to beta blocker and statin medications that she is taking for her heart condition.  I told her that many babies that are suspected to have fetal growth restriction are constitutionally small or healthy small babies.  I told her that true fetal growth restriction has been associated with an increased risk for having adverse  outcomes such as intrauterine demise,  death, and  morbidity such as: hypoglycemia, hyperbilirubinemia, hypothermia, intraventricular hemorrhage, necrotizing enterocolitis, seizures, sepsis, respiratory distress syndrome, and  death. At this time I recommend weekly fetal surveillance with BPP/NST and Doppler studies of the umbilical artery, middle cerebral artery, and uterine artery.  I also recommend delivery between  37 0/7 and 37 6/7 weeks gestation (early term) due to the maternal concurrent medical conditions and elevated inhibin A level which has been associated with an increase risk of stillbirth (ACOG Committee Opinion No. 560, 2013 Guidelines).\par \par She had a prior  section delivery. She was informed of the risks and benefits of a trial of labor. She was informed of the risk of uterine rupture and the associated maternal complications such as hysterectomy, blood transfusions, and intensive care unit admission. She was also informed of the associated  adverse outcomes in cases of uterine rupture such as stillbirth, fetal hypoxia and neurological impairment (cerebral palsy). She expressed a desire to have a repeat  section birth with the current pregnancy. \par \par \par \par \par \par

## 2019-09-20 NOTE — SURGICAL HISTORY
[Cysts] : cysts [Last Pap: ___] : Last Pap: [unfilled] [Fibroids] : no fibroids [Abn Paps] : no abnormal pap smears [Breast Disease] : no breast disease [STI's] : no STI's [Infertility] : no infertility [OC Use] : no OC use [Last Mammo: ___] : Last Mammo: none

## 2019-09-20 NOTE — ACTIVE PROBLEMS
[Diabetes Mellitus] : no diabetes mellitus [Hypertension] : no hypertension [Heart Disease] : no heart disease [Renal Disease] : no kidney disease, no UTI [Autoimmune Disease] : no autoimmune disease [Neurologic Disorder] : no neurologic disorder, no epilepsy [Depression] : no depression, no post partum depression [Psychiatric Disorders] : no psychiatric disorders [Thrombophlebitis] : no varicosities, no phlebitis [Hepatic Disorder] : no hepatitis, no liver disease [Blood Transfusion (___ Ml)] : no history of blood transfusion [Trauma] : no trauma/violence [Thyroid Disorder] : no thyroid dysfunction

## 2019-09-20 NOTE — PAST MEDICAL HISTORY
[Exposure To Gonorrhea] : no gonorrhea [HIV Infection] : no HIV [Chlamydial Infections] : no chlamydia [Syphilis] : no syphilis [Hepatitis, B Virus] : no Hepatitis B [Herpes Simplex] : no genital herpes [Human Papilloma Virus Infection] : no genital warts [Trichomoniasis] : no trichomoniasis [Hepatitis, C Virus] : no Hepatitis C discussion

## 2019-09-20 NOTE — CHIEF COMPLAINT
[G ___] : G [unfilled] [P ___] : P [unfilled] [de-identified] : an elevated maternal inhibin A level and a small for gestational age fetus

## 2019-09-23 ENCOUNTER — APPOINTMENT (OUTPATIENT)
Dept: ANTEPARTUM | Facility: CLINIC | Age: 39
End: 2019-09-23
Payer: MEDICAID

## 2019-09-23 ENCOUNTER — ASOB RESULT (OUTPATIENT)
Age: 39
End: 2019-09-23

## 2019-09-23 PROCEDURE — 76820 UMBILICAL ARTERY ECHO: CPT

## 2019-09-23 PROCEDURE — 76821 MIDDLE CEREBRAL ARTERY ECHO: CPT

## 2019-09-23 PROCEDURE — 76818 FETAL BIOPHYS PROFILE W/NST: CPT

## 2019-09-23 PROCEDURE — 93976 VASCULAR STUDY: CPT

## 2019-09-25 ENCOUNTER — APPOINTMENT (OUTPATIENT)
Dept: ELECTROPHYSIOLOGY | Facility: CLINIC | Age: 39
End: 2019-09-25
Payer: MEDICAID

## 2019-09-25 ENCOUNTER — RECORD ABSTRACTING (OUTPATIENT)
Age: 39
End: 2019-09-25

## 2019-09-25 VITALS
WEIGHT: 172 LBS | HEART RATE: 85 BPM | BODY MASS INDEX: 34.68 KG/M2 | DIASTOLIC BLOOD PRESSURE: 72 MMHG | SYSTOLIC BLOOD PRESSURE: 134 MMHG | HEIGHT: 59 IN

## 2019-09-25 DIAGNOSIS — Z72.0 TOBACCO USE: ICD-10-CM

## 2019-09-25 DIAGNOSIS — Z87.09 PERSONAL HISTORY OF OTHER DISEASES OF THE RESPIRATORY SYSTEM: ICD-10-CM

## 2019-09-25 DIAGNOSIS — Z95.810 PRESENCE OF AUTOMATIC (IMPLANTABLE) CARDIAC DEFIBRILLATOR: ICD-10-CM

## 2019-09-25 PROCEDURE — 99215 OFFICE O/P EST HI 40 MIN: CPT

## 2019-09-25 PROCEDURE — 93000 ELECTROCARDIOGRAM COMPLETE: CPT

## 2019-09-30 ENCOUNTER — APPOINTMENT (OUTPATIENT)
Dept: ANTEPARTUM | Facility: CLINIC | Age: 39
End: 2019-09-30
Payer: MEDICAID

## 2019-09-30 ENCOUNTER — ASOB RESULT (OUTPATIENT)
Age: 39
End: 2019-09-30

## 2019-09-30 PROCEDURE — 76818 FETAL BIOPHYS PROFILE W/NST: CPT

## 2019-09-30 PROCEDURE — 76820 UMBILICAL ARTERY ECHO: CPT

## 2019-10-07 ENCOUNTER — APPOINTMENT (OUTPATIENT)
Dept: ANTEPARTUM | Facility: CLINIC | Age: 39
End: 2019-10-07
Payer: MEDICAID

## 2019-10-07 ENCOUNTER — ASOB RESULT (OUTPATIENT)
Age: 39
End: 2019-10-07

## 2019-10-07 PROCEDURE — 76821 MIDDLE CEREBRAL ARTERY ECHO: CPT

## 2019-10-07 PROCEDURE — 76816 OB US FOLLOW-UP PER FETUS: CPT

## 2019-10-07 PROCEDURE — 76820 UMBILICAL ARTERY ECHO: CPT

## 2019-10-07 PROCEDURE — 93976 VASCULAR STUDY: CPT

## 2019-10-07 PROCEDURE — 76818 FETAL BIOPHYS PROFILE W/NST: CPT

## 2019-10-10 ENCOUNTER — APPOINTMENT (OUTPATIENT)
Dept: ANTEPARTUM | Facility: CLINIC | Age: 39
End: 2019-10-10
Payer: MEDICAID

## 2019-10-10 ENCOUNTER — APPOINTMENT (OUTPATIENT)
Dept: MATERNAL FETAL MEDICINE | Facility: CLINIC | Age: 39
End: 2019-10-10
Payer: MEDICAID

## 2019-10-10 ENCOUNTER — ASOB RESULT (OUTPATIENT)
Age: 39
End: 2019-10-10

## 2019-10-10 VITALS
WEIGHT: 178 LBS | OXYGEN SATURATION: 97 % | HEART RATE: 80 BPM | BODY MASS INDEX: 35.88 KG/M2 | DIASTOLIC BLOOD PRESSURE: 50 MMHG | HEIGHT: 59 IN | RESPIRATION RATE: 17 BRPM | SYSTOLIC BLOOD PRESSURE: 120 MMHG

## 2019-10-10 DIAGNOSIS — Z95.0 PRESENCE OF CARDIAC PACEMAKER: ICD-10-CM

## 2019-10-10 PROCEDURE — 99215 OFFICE O/P EST HI 40 MIN: CPT

## 2019-10-10 PROCEDURE — 76818 FETAL BIOPHYS PROFILE W/NST: CPT

## 2019-10-10 NOTE — DISCUSSION/SUMMARY
[FreeTextEntry1] : Please see the previous consultation for the patient's medical and obstetrical history. She is currently 34 weeks pregnant with a history of hypertrophic cardiomyopathy as well as arrhythmia currently on metoprolol and using a cardiac pacemaker. In addition she has an elevated Inhibin-A level at the 95th percentile and has a known small for gestational age fetus. Her obstetrical history is significant for delivery in  of a live-born male  weighing 5 lbs. 5 oz. delivered at 38 weeks via  section for preeclampsia and history of childhood epilepsy. The diagnosis of hypertrophic cardiomyopathy was made 6 months after delivery.\par \par A biophysical profile and NST were performed today and were 8 out of 8 and category one. Growth scan performed last week showed an estimated fetal weight at the 2nd percentile consistent with the previous study. In addition elevated umbilical artery Doppler study at the 90th percentile is noted. Vital signs today reveal blood pressure of 120/50, maternal weight is 178 pounds which is a 6 pound weight gain from the evaluation done on . This is consistent with a BMI of 35.95KG.\par \par Management of pregnancy was discussed. The patient has discontinued her Statin medication. She will continue on metoprolol twice a day. She has been evaluated by her electrophysiologist and has been advised that her pacemaker should be deactivated during her elective  and reactivated after surgery.  She has been advised to have an anesthesia consult prior to delivery. In addition she is to get clearance from her cardiologist prior to delivery. Because of the SGA fetus with elevated umbilical Dopplers delivery between 37 and 37-6/7 weeks is recommended. The patient is scheduled for a repeat  at 37 weeks and 3 days.  corticosteroids should be considered and given at approximately 37 weeks. No other changes in the current medical management are indicated. All of the above was discussed with the patient and all her questions were answered.\par \par I spent 40 minutes of which greater than 50% was spent on coordinating and consultation of care as described below.\par \par Recommendations;\par \par #1. Continue twice weekly biophysical profile.\par #2. Weekly NST and umbilical artery Doppler study.\par #3. Growth scan in 2 weeks is recommended.\par #4. Anesthesia consult is recommended.\par #5. Cardiology clearance prior to delivery is recommended.\par #6. Consider  corticosteroids around 37 weeks.\par #7. Delivery between 37 and 37-6/7 weeks is recommended.

## 2019-10-14 ENCOUNTER — APPOINTMENT (OUTPATIENT)
Dept: ANTEPARTUM | Facility: CLINIC | Age: 39
End: 2019-10-14
Payer: MEDICAID

## 2019-10-14 ENCOUNTER — ASOB RESULT (OUTPATIENT)
Age: 39
End: 2019-10-14

## 2019-10-14 PROCEDURE — 76818 FETAL BIOPHYS PROFILE W/NST: CPT

## 2019-10-17 ENCOUNTER — APPOINTMENT (OUTPATIENT)
Dept: ANTEPARTUM | Facility: CLINIC | Age: 39
End: 2019-10-17

## 2019-10-22 ENCOUNTER — APPOINTMENT (OUTPATIENT)
Dept: ANTEPARTUM | Facility: CLINIC | Age: 39
End: 2019-10-22
Payer: MEDICAID

## 2019-10-22 ENCOUNTER — ASOB RESULT (OUTPATIENT)
Age: 39
End: 2019-10-22

## 2019-10-22 PROCEDURE — 76818 FETAL BIOPHYS PROFILE W/NST: CPT

## 2019-10-23 ENCOUNTER — OUTPATIENT (OUTPATIENT)
Dept: OUTPATIENT SERVICES | Facility: HOSPITAL | Age: 39
LOS: 1 days | End: 2019-10-23
Payer: MEDICAID

## 2019-10-23 VITALS
RESPIRATION RATE: 20 BRPM | HEIGHT: 59 IN | DIASTOLIC BLOOD PRESSURE: 63 MMHG | TEMPERATURE: 98 F | WEIGHT: 149.91 LBS | HEART RATE: 81 BPM | SYSTOLIC BLOOD PRESSURE: 105 MMHG

## 2019-10-23 DIAGNOSIS — Z95.0 PRESENCE OF CARDIAC PACEMAKER: Chronic | ICD-10-CM

## 2019-10-23 DIAGNOSIS — Z01.818 ENCOUNTER FOR OTHER PREPROCEDURAL EXAMINATION: ICD-10-CM

## 2019-10-23 DIAGNOSIS — Z98.891 HISTORY OF UTERINE SCAR FROM PREVIOUS SURGERY: Chronic | ICD-10-CM

## 2019-10-23 LAB
APPEARANCE UR: CLEAR — SIGNIFICANT CHANGE UP
BASOPHILS # BLD AUTO: 0.02 K/UL — SIGNIFICANT CHANGE UP (ref 0–0.2)
BASOPHILS NFR BLD AUTO: 0.2 % — SIGNIFICANT CHANGE UP (ref 0–2)
BILIRUB UR-MCNC: NEGATIVE — SIGNIFICANT CHANGE UP
BLD GP AB SCN SERPL QL: SIGNIFICANT CHANGE UP
COLOR SPEC: YELLOW — SIGNIFICANT CHANGE UP
DIFF PNL FLD: NEGATIVE — SIGNIFICANT CHANGE UP
EOSINOPHIL # BLD AUTO: 0.14 K/UL — SIGNIFICANT CHANGE UP (ref 0–0.5)
EOSINOPHIL NFR BLD AUTO: 1.3 % — SIGNIFICANT CHANGE UP (ref 0–6)
GLUCOSE UR QL: NEGATIVE MG/DL — SIGNIFICANT CHANGE UP
HCT VFR BLD CALC: 31.1 % — LOW (ref 34.5–45)
HGB BLD-MCNC: 9.5 G/DL — LOW (ref 11.5–15.5)
HIV 1 & 2 AB SERPL IA.RAPID: SIGNIFICANT CHANGE UP
IMM GRANULOCYTES NFR BLD AUTO: 0.6 % — SIGNIFICANT CHANGE UP (ref 0–1.5)
KETONES UR-MCNC: NEGATIVE — SIGNIFICANT CHANGE UP
LEUKOCYTE ESTERASE UR-ACNC: NEGATIVE — SIGNIFICANT CHANGE UP
LYMPHOCYTES # BLD AUTO: 1.08 K/UL — SIGNIFICANT CHANGE UP (ref 1–3.3)
LYMPHOCYTES # BLD AUTO: 10.2 % — LOW (ref 13–44)
MCHC RBC-ENTMCNC: 26.2 PG — LOW (ref 27–34)
MCHC RBC-ENTMCNC: 30.5 GM/DL — LOW (ref 32–36)
MCV RBC AUTO: 85.9 FL — SIGNIFICANT CHANGE UP (ref 80–100)
MONOCYTES # BLD AUTO: 0.65 K/UL — SIGNIFICANT CHANGE UP (ref 0–0.9)
MONOCYTES NFR BLD AUTO: 6.1 % — SIGNIFICANT CHANGE UP (ref 2–14)
NEUTROPHILS # BLD AUTO: 8.62 K/UL — HIGH (ref 1.8–7.4)
NEUTROPHILS NFR BLD AUTO: 81.6 % — HIGH (ref 43–77)
NITRITE UR-MCNC: NEGATIVE — SIGNIFICANT CHANGE UP
PH UR: 8 — SIGNIFICANT CHANGE UP (ref 5–8)
PLATELET # BLD AUTO: 153 K/UL — SIGNIFICANT CHANGE UP (ref 150–400)
PROT UR-MCNC: NEGATIVE MG/DL — SIGNIFICANT CHANGE UP
RBC # BLD: 3.62 M/UL — LOW (ref 3.8–5.2)
RBC # FLD: 16.6 % — HIGH (ref 10.3–14.5)
SP GR SPEC: 1.01 — SIGNIFICANT CHANGE UP (ref 1.01–1.02)
UROBILINOGEN FLD QL: NEGATIVE MG/DL — SIGNIFICANT CHANGE UP
WBC # BLD: 10.57 K/UL — HIGH (ref 3.8–10.5)
WBC # FLD AUTO: 10.57 K/UL — HIGH (ref 3.8–10.5)

## 2019-10-23 NOTE — OB PST NOTE - NS_MATERNALCONCERNS_OBGYN_ALL_OB_FT
Pt diagnosed with HCM after last delivery and had a pacemaker placed By Dr. Jarquin . Office called for delivery recommendations on 10-23-19 @ 12:35 pm.  Dr. PAYTON Barr attended to consult, Spoke with Dr. Mckenzie vis Telephone. Orders rec'd fpr Betamethasone and given. Pt will return tomorrow for second injection and Anesthesia consult for  section planned on 19 @ 37.3 weeks.

## 2019-10-23 NOTE — OB PST NOTE - PMH
Hyperlipidemia    Hypertrophic cardiomyopathy  Pacemaker 4-17  Palpitations    Pre-eclampsia, antepartum  2012  Preeclampsia    Seizure disorder

## 2019-10-24 ENCOUNTER — OUTPATIENT (OUTPATIENT)
Dept: OUTPATIENT SERVICES | Facility: HOSPITAL | Age: 39
LOS: 1 days | End: 2019-10-24
Payer: MEDICAID

## 2019-10-24 DIAGNOSIS — Z98.891 HISTORY OF UTERINE SCAR FROM PREVIOUS SURGERY: Chronic | ICD-10-CM

## 2019-10-24 DIAGNOSIS — Z95.0 PRESENCE OF CARDIAC PACEMAKER: Chronic | ICD-10-CM

## 2019-10-24 DIAGNOSIS — O47.03 FALSE LABOR BEFORE 37 COMPLETED WEEKS OF GESTATION, THIRD TRIMESTER: ICD-10-CM

## 2019-10-24 PROBLEM — I42.2 OTHER HYPERTROPHIC CARDIOMYOPATHY: Chronic | Status: ACTIVE | Noted: 2019-10-23

## 2019-10-24 PROBLEM — O14.90 UNSPECIFIED PRE-ECLAMPSIA, UNSPECIFIED TRIMESTER: Chronic | Status: ACTIVE | Noted: 2019-10-23

## 2019-10-24 LAB
HIV 1+2 AB+HIV1 P24 AG SERPL QL IA: SIGNIFICANT CHANGE UP
T PALLIDUM AB TITR SER: NEGATIVE — SIGNIFICANT CHANGE UP

## 2019-10-24 PROCEDURE — G0463: CPT

## 2019-10-24 PROCEDURE — 96372 THER/PROPH/DIAG INJ SC/IM: CPT

## 2019-10-24 RX ADMIN — Medication 12 MILLIGRAM(S): at 15:02

## 2019-10-25 ENCOUNTER — APPOINTMENT (OUTPATIENT)
Dept: ANTEPARTUM | Facility: CLINIC | Age: 39
End: 2019-10-25
Payer: MEDICAID

## 2019-10-25 ENCOUNTER — ASOB RESULT (OUTPATIENT)
Age: 39
End: 2019-10-25

## 2019-10-25 PROCEDURE — 76818 FETAL BIOPHYS PROFILE W/NST: CPT

## 2019-10-25 NOTE — CONSULT NOTE ADULT - SUBJECTIVE AND OBJECTIVE BOX
39 year old female at 31 weeks gestation for primary C/Section. Pt has a h/o Hypertrophic cardiomyopathy, cardiac arrythmias and IHSS. In 2012 pt developed cardiac arrythmias which persisted till 2014 at which time a loop recorder was placed. in 2017 pt had a subcutaneous ICD placed by Dr. Lynn. When recently interrogating her ICD it showed an event of VTach at which time she was shocked uneventfully at 12 weeks gestation. Her echo which was done in 1/19 revealed left ventricle of normal size with outflow tract obstruction consistent with IHSS. EF was 75%. Pt was informed with her history of IHSS she would need to have a general anesthesia, which she was nor happy about. Cardiac clearance to be done prior to C/S.

## 2019-10-28 ENCOUNTER — ASOB RESULT (OUTPATIENT)
Age: 39
End: 2019-10-28

## 2019-10-28 ENCOUNTER — APPOINTMENT (OUTPATIENT)
Dept: ANTEPARTUM | Facility: CLINIC | Age: 39
End: 2019-10-28
Payer: MEDICAID

## 2019-10-28 PROCEDURE — 76816 OB US FOLLOW-UP PER FETUS: CPT

## 2019-10-28 RX ORDER — PANTOPRAZOLE 40 MG/1
40 TABLET, DELAYED RELEASE ORAL DAILY
Qty: 90 | Refills: 1 | Status: DISCONTINUED | COMMUNITY
Start: 2019-09-19 | End: 2019-10-28

## 2019-10-28 RX ORDER — POLYETHYLENE GLYCOL 3350 17 G/17G
17 POWDER, FOR SOLUTION ORAL DAILY
Qty: 3 | Refills: 3 | Status: DISCONTINUED | COMMUNITY
Start: 2019-08-08 | End: 2019-10-28

## 2019-10-28 RX ORDER — RANITIDINE HCL 150 MG
150 CAPSULE ORAL
Refills: 0 | Status: DISCONTINUED | COMMUNITY
End: 2019-10-28

## 2019-10-28 RX ORDER — FLUTICASONE PROPIONATE 50 MCG
50 SPRAY, SUSPENSION NASAL TWICE DAILY
Refills: 0 | Status: DISCONTINUED | COMMUNITY
End: 2019-10-28

## 2019-10-28 RX ORDER — HYDROCORTISONE 25 MG/G
2.5 CREAM TOPICAL TWICE DAILY
Qty: 1 | Refills: 3 | Status: DISCONTINUED | COMMUNITY
Start: 2019-08-08 | End: 2019-10-28

## 2019-10-31 ENCOUNTER — FORM ENCOUNTER (OUTPATIENT)
Age: 39
End: 2019-10-31

## 2019-10-31 ENCOUNTER — APPOINTMENT (OUTPATIENT)
Dept: ANTEPARTUM | Facility: CLINIC | Age: 39
End: 2019-10-31
Payer: MEDICAID

## 2019-10-31 ENCOUNTER — ASOB RESULT (OUTPATIENT)
Age: 39
End: 2019-10-31

## 2019-10-31 PROCEDURE — 76820 UMBILICAL ARTERY ECHO: CPT

## 2019-10-31 PROCEDURE — 76818 FETAL BIOPHYS PROFILE W/NST: CPT

## 2019-11-01 ENCOUNTER — OUTPATIENT (OUTPATIENT)
Dept: OUTPATIENT SERVICES | Facility: HOSPITAL | Age: 39
LOS: 1 days | End: 2019-11-01
Payer: MEDICAID

## 2019-11-01 VITALS — DIASTOLIC BLOOD PRESSURE: 88 MMHG | HEART RATE: 87 BPM | SYSTOLIC BLOOD PRESSURE: 119 MMHG

## 2019-11-01 VITALS — HEART RATE: 71 BPM | OXYGEN SATURATION: 99 %

## 2019-11-01 DIAGNOSIS — O47.1 FALSE LABOR AT OR AFTER 37 COMPLETED WEEKS OF GESTATION: ICD-10-CM

## 2019-11-01 DIAGNOSIS — Z95.0 PRESENCE OF CARDIAC PACEMAKER: Chronic | ICD-10-CM

## 2019-11-01 DIAGNOSIS — Z98.891 HISTORY OF UTERINE SCAR FROM PREVIOUS SURGERY: Chronic | ICD-10-CM

## 2019-11-01 LAB
ALBUMIN SERPL ELPH-MCNC: 3.4 G/DL — SIGNIFICANT CHANGE UP (ref 3.3–5.2)
ALP SERPL-CCNC: 110 U/L — SIGNIFICANT CHANGE UP (ref 40–120)
ALT FLD-CCNC: 10 U/L — SIGNIFICANT CHANGE UP
ANION GAP SERPL CALC-SCNC: 15 MMOL/L — SIGNIFICANT CHANGE UP (ref 5–17)
AST SERPL-CCNC: 18 U/L — SIGNIFICANT CHANGE UP
BASOPHILS # BLD AUTO: 0.02 K/UL — SIGNIFICANT CHANGE UP (ref 0–0.2)
BASOPHILS NFR BLD AUTO: 0.2 % — SIGNIFICANT CHANGE UP (ref 0–2)
BILIRUB SERPL-MCNC: 0.2 MG/DL — LOW (ref 0.4–2)
BUN SERPL-MCNC: 8 MG/DL — SIGNIFICANT CHANGE UP (ref 8–20)
CALCIUM SERPL-MCNC: 9.3 MG/DL — SIGNIFICANT CHANGE UP (ref 8.6–10.2)
CHLORIDE SERPL-SCNC: 99 MMOL/L — SIGNIFICANT CHANGE UP (ref 98–107)
CO2 SERPL-SCNC: 22 MMOL/L — SIGNIFICANT CHANGE UP (ref 22–29)
CREAT SERPL-MCNC: 0.43 MG/DL — LOW (ref 0.5–1.3)
EOSINOPHIL # BLD AUTO: 0.12 K/UL — SIGNIFICANT CHANGE UP (ref 0–0.5)
EOSINOPHIL NFR BLD AUTO: 1.2 % — SIGNIFICANT CHANGE UP (ref 0–6)
GLUCOSE SERPL-MCNC: 73 MG/DL — SIGNIFICANT CHANGE UP (ref 70–115)
HCT VFR BLD CALC: 33.2 % — LOW (ref 34.5–45)
HGB BLD-MCNC: 10.5 G/DL — LOW (ref 11.5–15.5)
IMM GRANULOCYTES NFR BLD AUTO: 0.4 % — SIGNIFICANT CHANGE UP (ref 0–1.5)
LYMPHOCYTES # BLD AUTO: 1.9 K/UL — SIGNIFICANT CHANGE UP (ref 1–3.3)
LYMPHOCYTES # BLD AUTO: 18.2 % — SIGNIFICANT CHANGE UP (ref 13–44)
MCHC RBC-ENTMCNC: 26.4 PG — LOW (ref 27–34)
MCHC RBC-ENTMCNC: 31.6 GM/DL — LOW (ref 32–36)
MCV RBC AUTO: 83.6 FL — SIGNIFICANT CHANGE UP (ref 80–100)
MONOCYTES # BLD AUTO: 0.54 K/UL — SIGNIFICANT CHANGE UP (ref 0–0.9)
MONOCYTES NFR BLD AUTO: 5.2 % — SIGNIFICANT CHANGE UP (ref 2–14)
NEUTROPHILS # BLD AUTO: 7.8 K/UL — HIGH (ref 1.8–7.4)
NEUTROPHILS NFR BLD AUTO: 74.8 % — SIGNIFICANT CHANGE UP (ref 43–77)
NT-PROBNP SERPL-SCNC: 693 PG/ML — HIGH (ref 0–300)
PLATELET # BLD AUTO: 191 K/UL — SIGNIFICANT CHANGE UP (ref 150–400)
POTASSIUM SERPL-MCNC: 4.7 MMOL/L — SIGNIFICANT CHANGE UP (ref 3.5–5.3)
POTASSIUM SERPL-SCNC: 4.7 MMOL/L — SIGNIFICANT CHANGE UP (ref 3.5–5.3)
PROT SERPL-MCNC: 7.2 G/DL — SIGNIFICANT CHANGE UP (ref 6.6–8.7)
RAPID RVP RESULT: SIGNIFICANT CHANGE UP
RBC # BLD: 3.97 M/UL — SIGNIFICANT CHANGE UP (ref 3.8–5.2)
RBC # FLD: 16.4 % — HIGH (ref 10.3–14.5)
SODIUM SERPL-SCNC: 136 MMOL/L — SIGNIFICANT CHANGE UP (ref 135–145)
WBC # BLD: 10.42 K/UL — SIGNIFICANT CHANGE UP (ref 3.8–10.5)
WBC # FLD AUTO: 10.42 K/UL — SIGNIFICANT CHANGE UP (ref 3.8–10.5)

## 2019-11-01 PROCEDURE — 93306 TTE W/DOPPLER COMPLETE: CPT | Mod: 26

## 2019-11-01 PROCEDURE — 93306 TTE W/DOPPLER COMPLETE: CPT

## 2019-11-01 PROCEDURE — 83880 ASSAY OF NATRIURETIC PEPTIDE: CPT

## 2019-11-01 PROCEDURE — 93010 ELECTROCARDIOGRAM REPORT: CPT

## 2019-11-01 PROCEDURE — 99223 1ST HOSP IP/OBS HIGH 75: CPT

## 2019-11-01 PROCEDURE — 80053 COMPREHEN METABOLIC PANEL: CPT

## 2019-11-01 PROCEDURE — 59025 FETAL NON-STRESS TEST: CPT

## 2019-11-01 PROCEDURE — 87798 DETECT AGENT NOS DNA AMP: CPT

## 2019-11-01 PROCEDURE — 85027 COMPLETE CBC AUTOMATED: CPT

## 2019-11-01 PROCEDURE — 36415 COLL VENOUS BLD VENIPUNCTURE: CPT

## 2019-11-01 PROCEDURE — 71046 X-RAY EXAM CHEST 2 VIEWS: CPT | Mod: 26

## 2019-11-01 PROCEDURE — 87581 M.PNEUMON DNA AMP PROBE: CPT

## 2019-11-01 PROCEDURE — 71046 X-RAY EXAM CHEST 2 VIEWS: CPT

## 2019-11-01 PROCEDURE — 87486 CHLMYD PNEUM DNA AMP PROBE: CPT

## 2019-11-01 PROCEDURE — G0463: CPT

## 2019-11-01 PROCEDURE — 59050 FETAL MONITOR W/REPORT: CPT

## 2019-11-01 PROCEDURE — 87633 RESP VIRUS 12-25 TARGETS: CPT

## 2019-11-01 PROCEDURE — 93005 ELECTROCARDIOGRAM TRACING: CPT

## 2019-11-01 NOTE — OB PROVIDER TRIAGE NOTE - NSHPLABSRESULTS_GEN_ALL_CORE
EKG  Cardio and pulmonology consults placed EKG: normal sinus rhythm, normal axis, ischemic changes in leads II and avf, left ventricular hypertrophy    Consults  - Cardio: no issues with ICD, edema most likely 2/2 to pregnancy, f/u with echo. No urgency for delivery today.  - Pulmonology: likely bronchitis or viral respiratory infection, no urgency for delivery today. Recommend CXR.    Echo ordered  CBC, CMP EKG: normal sinus rhythm, normal axis, 72 bpm, ischemic changes in leads II and avf, left ventricular hypertrophy    Consults  - Cardio: no issues with ICD, edema most likely 2/2 to pregnancy, f/u with echo. No urgency for delivery today.  - Pulmonology: likely bronchitis or viral respiratory infection, no urgency for delivery today. Recommend CXR.    Echo ordered  CBC, CMP EKG: normal sinus rhythm, normal axis, 72 bpm, ischemic changes in leads II and avf, left ventricular hypertrophy    Consults  - Cardio: no issues with ICD, edema most likely 2/2 to pregnancy, f/u with echo. No urgency for delivery today.  - Pulmonology: likely bronchitis or viral respiratory infection, no urgency for delivery today. Recommend CXR.    Echo ordered  CBC, CMP                          10.5   10.42 )-----------( 191      ( 01 Nov 2019 16:09 )             33.2   11-01    136  |  99  |  8.0  ----------------------------<  73  4.7   |  22.0  |  0.43<L>    Ca    9.3      01 Nov 2019 16:09    TPro  7.2  /  Alb  3.4  /  TBili  0.2<L>  /  DBili  x   /  AST  18  /  ALT  10  /  AlkPhos  110  11-01    Rapid Respiratory Viral Panel (11.01.19 @ 16:37)  Rapid RVP Result: NotDetected    < from: 12 Lead ECG (11.01.19 @ 13:54) >  Ventricular Rate 72 BPM  Atrial Rate 72 BPM  P-R Interval 132 ms  QRS Duration 74 ms  Q-T Interval 430 ms  QTC Calculation(Bezet) 470 ms  P Axis 20 degrees  R Axis 3 degrees  T Axis 159 degrees    Diagnosis Line Normal sinus rhythm  Left ventricular hypertrophy with repolarization abnormality  Inferior infarct , age undetermined  Confirmed by RAJESH MORALES ([id number]) on 11/1/2019 3:17:49 PM  < end of copied text >    < from: TTE Echo Complete w/Doppler (11.01.19 @ 15:15) >  PHYSICIAN INTERPRETATION:  Left Ventricle: The left ventricular internal cavity size is normal.   There is moderate asymmetric left ventricular hypertrophy involving the septal wall. The LVH involves septal walls.  Global LV systolic function was normal. Left ventricular ejection   fraction, by visual estimation, is 60 to 65%. The left ventricular diastolic function could not be assessed in this study.  Right Ventricle: The right ventricular size is normal. RV systolic function is normal.  Left Atrium: Normal left atrial size.  Right Atrium: Normal right atrial size.  Pericardium: There is no evidence of pericardial effusion.  Mitral Valve: The mitral valve is normal in structure. No evidence of mitral stenosis. Moderate to severe mitral valve regurgitation is seen.   There is ANGE noted with posterior directed MR.  Tricuspid Valve: The tricuspid valve is not well seen. Adequate TR velocity was not obtained to accurately assess RVSP.  Aortic Valve: The aortic valve was not well visualized.  Pulmonic Valve: The pulmonic valve was not well visualized.  Aorta: The aortic root is normal in size and structure.    Summary:   1. Technically limited study.   2. Left ventricular ejection fraction, by visual estimation, is 60 to 65%.   3. Normal global left ventricular systolic function.   4. The left ventricular diastolic function could not be assessed in this study.   5. Normal left ventricular internal cavity size.   6. There is moderate asymmetric leftventricular hypertrophy.   7. Normal left atrial size.   8. There is ANGE noted with moderate to severe posteriorly directed MR.   9. There is no evidence of pericardial effusion.    MD Ar Electronically signed on 11/1/2019 at 5:32:26 PM  *** Final ***  < end of copied text >    < from: Xray Chest 2 Views PA/Lat (11.01.19 @ 16:05) >  EXAM:  XR CHEST PA LAT 2V                        PROCEDURE DATE:  11/01/2019    INTERPRETATION:  TECHNIQUE: 2 views of the chest.  COMPARISON: 4/26 2018  CLINICAL HISTORY: cough, sob, r/o pulm edema    FINDINGS:  Frontal and lateral views of the chest demonstrates the lungs to be clear. The cardiomediastinal silhouette is enlarged. No acute osseous abnormalities. Left-sided external defibrillator is noted. Consider follow-up or CT as clinically warranted.    IMPRESSION:  No acute cardiopulmonary disease process. Cardiomegaly.    MOHSEN VILLEGAS M.D., ATTENDING RADIOLOGIST  This document has been electronically signed. Nov 1 2019  4:09PM  < end of copied text > EKG: normal sinus rhythm, normal axis, 72 bpm, ischemic changes in leads II and avf, left ventricular hypertrophy    Consults  - Cardio: no issues with ICD, edema most likely 2/2 to pregnancy, f/u with echo. No urgency for delivery today.  - Pulmonology: likely bronchitis or viral respiratory infection, no urgency for delivery today.  CBC, CMP                          10.5   10.42 )-----------( 191      ( 01 Nov 2019 16:09 )             33.2   11-01    136  |  99  |  8.0  ----------------------------<  73  4.7   |  22.0  |  0.43<L>    Ca    9.3      01 Nov 2019 16:09    TPro  7.2  /  Alb  3.4  /  TBili  0.2<L>  /  DBili  x   /  AST  18  /  ALT  10  /  AlkPhos  110  11-01    Rapid Respiratory Viral Panel (11.01.19 @ 16:37)  Rapid RVP Result: NotDetected    < from: 12 Lead ECG (11.01.19 @ 13:54) >  Ventricular Rate 72 BPM  Atrial Rate 72 BPM  P-R Interval 132 ms  QRS Duration 74 ms  Q-T Interval 430 ms  QTC Calculation(Bezet) 470 ms  P Axis 20 degrees  R Axis 3 degrees  T Axis 159 degrees    Diagnosis Line Normal sinus rhythm  Left ventricular hypertrophy with repolarization abnormality  Inferior infarct , age undetermined  Confirmed by RAJESH MORALES ([id number]) on 11/1/2019 3:17:49 PM  < end of copied text >    < from: TTE Echo Complete w/Doppler (11.01.19 @ 15:15) >  PHYSICIAN INTERPRETATION:  Left Ventricle: The left ventricular internal cavity size is normal.   There is moderate asymmetric left ventricular hypertrophy involving the septal wall. The LVH involves septal walls.  Global LV systolic function was normal. Left ventricular ejection   fraction, by visual estimation, is 60 to 65%. The left ventricular diastolic function could not be assessed in this study.  Right Ventricle: The right ventricular size is normal. RV systolic function is normal.  Left Atrium: Normal left atrial size.  Right Atrium: Normal right atrial size.  Pericardium: There is no evidence of pericardial effusion.  Mitral Valve: The mitral valve is normal in structure. No evidence of mitral stenosis. Moderate to severe mitral valve regurgitation is seen.   There is ANGE noted with posterior directed MR.  Tricuspid Valve: The tricuspid valve is not well seen. Adequate TR velocity was not obtained to accurately assess RVSP.  Aortic Valve: The aortic valve was not well visualized.  Pulmonic Valve: The pulmonic valve was not well visualized.  Aorta: The aortic root is normal in size and structure.    Summary:   1. Technically limited study.   2. Left ventricular ejection fraction, by visual estimation, is 60 to 65%.   3. Normal global left ventricular systolic function.   4. The left ventricular diastolic function could not be assessed in this study.   5. Normal left ventricular internal cavity size.   6. There is moderate asymmetric leftventricular hypertrophy.   7. Normal left atrial size.   8. There is ANGE noted with moderate to severe posteriorly directed MR.   9. There is no evidence of pericardial effusion.    MD rA Electronically signed on 11/1/2019 at 5:32:26 PM  *** Final ***  < end of copied text >    < from: Xray Chest 2 Views PA/Lat (11.01.19 @ 16:05) >  EXAM:  XR CHEST PA LAT 2V                        PROCEDURE DATE:  11/01/2019    INTERPRETATION:  TECHNIQUE: 2 views of the chest.  COMPARISON: 4/26 2018  CLINICAL HISTORY: cough, sob, r/o pulm edema    FINDINGS:  Frontal and lateral views of the chest demonstrates the lungs to be clear. The cardiomediastinal silhouette is enlarged. No acute osseous abnormalities. Left-sided external defibrillator is noted. Consider follow-up or CT as clinically warranted.    IMPRESSION:  No acute cardiopulmonary disease process. Cardiomegaly.    MOHSEN VILLEGAS M.D., ATTENDING RADIOLOGIST  This document has been electronically signed. Nov 1 2019  4:09PM  < end of copied text >

## 2019-11-01 NOTE — OB PROVIDER TRIAGE NOTE - HISTORY OF PRESENT ILLNESS
Pt is a 38 yo  at 37w sent from doctor's office due to shortness of breath. Pt says she has been coughing and short of breath since receiving to doses of betamethasone approximately 1 week ago. She has taken penicillin 500mg TID for days without resolve.   +FM, -VB, -LOF, -CTX    POBhx  - pCS 2/2 preeclampsia   GYNhx: denies abnormal pap smears,  PMH: pregnancy induced cardiomyopathy, HTN, hyperlipidemia, history of seizures  PSH: pacemaker placement , ear tube placement q1yr for 3 yrs [id number]  Meds: metoprolol 100mg BID, simvastatin, penicillin 500 TID,   ALL: sulfa, prednisone, codiene Pt is a 40 yo  at 37w sent from doctor's office due to shortness of breath. Pt says she has been coughing and short of breath since receiving to doses of betamethasone approximately 1 week ago. She has taken penicillin 500mg TID for days without resolve.   +FM, -VB, -LOF, -CTX    POBhx  -  pCS 2/2 preeclampsia   GYNhx: denies abnormal pap smears,  PMH: pregnancy induced cardiomyopathy, HTN, hyperlipidemia, history of seizures  PSH: pacemaker placement , ear tube placement q1yr for 3 yrs [id number]  Meds: metoprolol 100mg BID, simvastatin 20mg QD, penicillin 500 TID,   ALL: sulfa, prednisone, codiene Pt is a 40 yo  at 37w sent from doctor's office due to shortness of breath. Pt says she has been coughing and short of breath since receiving to doses of betamethasone approximately 1 week ago. She has taken penicillin 500mg TID for days without resolve.   +FM, -VB, -LOF, -CTX    POBhx  -  pCS 2/2 preeclampsia   GYNhx: denies abnormal pap smears,  PMH: pregnancy induced cardiomyopathy, HTN, hyperlipidemia, history of seizures  PSH: pacemaker placement , ear tube placement q1yr for 3 yrs [id number]  Meds: metoprolol 100mg BID, simvastatin 20mg QD, penicillin 500 TID,   ALL: sulfa, prednisone, codeine

## 2019-11-01 NOTE — CONSULT NOTE ADULT - SUBJECTIVE AND OBJECTIVE BOX
PULMONARY CONSULT NOTE      GABRIELA LESLIEN-[id number]    Patient is a 39y old  Female who presents with a chief complaint of sob, cough    HISTORY OF PRESENT ILLNESS: 37 weeks pregnant; hx of HOCM after last pregnancy s/p AICD. Was smoking up until this current pregnancy. Was planned on . Doing well until she had her betemethasone shot and afterwards started coughing. Coughing became more frequent. Had gotten PCN for UTI, strep. Cough resulting in SOB when she has coughing fits. No sob otherwise. No cp. No fever. No hemoptysis. Also with increased LE edema the last week. Cough not productive but feels mucous, congestion in throat, upper airway, chest.     MEDICATIONS  (STANDING):      MEDICATIONS  (PRN):      Allergies    codeine (Vomiting)  sulfa drugs (Seizure)    Intolerances    predniSONE (Other)      PAST MEDICAL & SURGICAL HISTORY:  Pre-eclampsia, antepartum:   Hypertrophic cardiomyopathy: Pacemaker -17  Palpitations  Hyperlipidemia  Preeclampsia  Seizure disorder  Cardiac pacemaker: 4-17  H/O:       FAMILY HISTORY:  Family history of heart disease  Family history of diabetes mellitus      SOCIAL HISTORY  Smoking History: up until 2019    REVIEW OF SYSTEMS:    CONSTITUTIONAL:  as above    HEENT:  Eyes:  No diplopia or blurred vision. ENT:  No earache, sore throat or runny nose.    CARDIOVASCULAR:  as above    RESPIRATORY: per HPI      GASTROINTESTINAL:  No abdominal pain, nausea, vomiting or diarrhea.    GENITOURINARY:  as above    NEUROLOGIC:  No paresthesias, fasciculations, seizures or weakness.    PSYCHIATRIC:  No disorder of thought or mood.    Vital Signs Last 24 Hrs  T(C): 36.6 (2019 12:51), Max: 36.6 (2019 12:51)  T(F): 97.9 (2019 12:51), Max: 97.9 (2019 12:51)  HR: 71 (2019 13:51) (71 - 90)  BP: 119/88 (2019 12:51) (119/88 - 119/88)  BP(mean): --  RR: 18 (2019 12:51) (18 - 18)  SpO2: 99% (2019 13:51) (94% - 100%)    PHYSICAL EXAMINATION:    GENERAL: The patient is  in no apparent distress.     HEENT: Head is normocephalic and atraumatic. Extraocular muscles are intact. Mucous membranes are moist.     NECK: Supple.     LUNGS: rales at bases, no wheeze, respirations unlabored    HEART: Regular rate and rhythm; systolic murmur      ABDOMEN: Soft, nontender, gravid.       EXTREMITIES: Without any cyanosis, clubbing, rash, lesions or edema.    NEUROLOGIC: Grossly intact.      LABS:  Pending           MICROBIOLOGY:  pending    RADIOLOGY & ADDITIONAL STUDIES:'  pending

## 2019-11-01 NOTE — CONSULT NOTE ADULT - SUBJECTIVE AND OBJECTIVE BOX
[facility]                                                       [facility]                                                        Office: [address] [address]                                                       Telephone: [phone]. Fax:[phone]    Patient is a 39y old  Female who presents with a chief complaint of cough.     HPI: 38 y/o  at 37 weeks with a PMHx of hypertrophic CM with outflow tract obstruction with NSVT s/p primary prevention ICD ([place] Sci ), HLD, and obesity who was sent from her doctor's office today for persistent cough and dyspnea. Patient states that since she received her Betamethasone shot for (fetal lung maturity) has been experiencing a persistent cough, and feels like the mucous is stuck in her chest. Patient states that the symptoms have been persistently worsening for the last week and a 1/2, and her OB advised her to come to the L&D to be evaluated. Patient is scheduled for  on Monday. Patient is currently resting comfortably, notes some mild leg swelling, but thinks it is only related to her pregnancy. Patient denies any fevers, chills, CP, persistent palpitations, ICD shock, syncope, near syncope, abdominal pain, N/V/D, headache, or dizziness.     PAST MEDICAL & SURGICAL HISTORY:  Pre-eclampsia, antepartum:   Hypertrophic cardiomyopathy: Pacemaker   Palpitations  Hyperlipidemia  Preeclampsia  Seizure disorder  Cardiac pacemaker: -  H/O:       PREVIOUS DIAGNOSTIC TESTING:      ECHO  FINDINGS:  Echo: 7/10/17, LVEF 72%, asymmetric septal hypertrophy c/w HCM with outflow tract obstruction at rest (88mmHg up to 100mmHg post Valsalva) LA 4.4cm, ANGE with mild –mod eccentric MR, trace TR     STRESS  FINDINGS:  Stress Test: 4/17/15, normal. Nml EF and wall motion. BP increased from 108//80.     Allergies    codeine (Vomiting)  sulfa drugs (Seizure)    Intolerances    predniSONE (Other)      MEDICATIONS  (STANDING):    Home Medications:  metoprolol succinate 100 mg oral tablet, extended release: 1 tab(s) orally 2 times a day (23 Oct 2019 12:53)  prenatal vitamins: once a day (23 Oct 2019 12:53)  simvastatin 20 mg oral tablet: 1 tab(s) orally once a day (at bedtime) (23 Oct 2019 12:53)        FAMILY HISTORY:  2 Family members with HCM with history of Syncope and ICD implantation  Uncle sudden death in his 50s, but apparently autopsy revealed acute MI.     SOCIAL HISTORY:     CIGARETTES: Former smoker, 1/2 PPD x 15 years    ALCOHOL: Nothing currently    DRUGS: Denies    REVIEW OF SYSTEMS:  CONSTITUTIONAL: No fever, weight loss, or fatigue  Cardiovascular: AS PER HPI  Respiratory:  AS PER HPI  Genitourinary:  No dysuria, no hematuria;   Gastrointestinal:   No dark color stool, no melena, no diarrhea, no constipation, no abdominal pain;   Neurological: No headache, no dizziness, no slurred speech;    Psychiatric: No agitation, no anxiety.    ALL OTHER REVIEW OF SYSTEMS ARE NEGATIVE.    Vital Signs Last 24 Hrs  T(C): 36.6 (2019 12:51), Max: 36.6 (2019 12:51)  T(F): 97.9 (2019 12:51), Max: 97.9 (2019 12:51)  HR: 71 (2019 13:51) (71 - 90)  BP: 119/88 (2019 12:51) (119/88 - 119/88)  RR: 18 (2019 12:51) (18 - 18)  SpO2: 99% (2019 13:51) (94% - 100%)    PHYSICAL EXAM:  Appearance: Normal, well nourished	  HEENT:   Normal oral mucosa, PERRL, EOMI, sclera non-icteric	  Lymphatic: No cervical lymphadenopathy  Cardiovascular: Normal S1 S2, No JVD, II/VI systolic murmur at apex,, No carotid bruits, Trace peripheral edema  Respiratory: Coarse rhonchi upper airways  Psychiatry: A & O x 3, Mood & affect appropriate  Gastrointestinal:  Soft, Non-tender, + BS, no bruits	  Skin: No rashes, No ecchymoses, No cyanosis  Neurologic: Grossly non-focal with full strength in all four extremities  Extremities: Normal range of motion, No clubbing, cyanosis, trace LE edema   Vascular: Peripheral pulses palpable 2+ bilaterally      INTERPRETATION OF TELEMETRY: Not on telemetry     ECG: NSR, LVH with repolarization abnormalities     LABS: Pending                  I&O's Summary    BNP    RADIOLOGY & ADDITIONAL STUDIES: [facility]                                                       [facility]                                                        Office: [address] [address]                                                       Telephone: [phone]. Fax:[phone]    Patient is a 39y old  Female who presents with a chief complaint of cough.     HPI: 40 y/o  at 37 weeks with a PMHx of hypertrophic CM with outflow tract obstruction with NSVT s/p primary prevention ICD ([place] Sci ), HLD, and obesity who was sent from her doctor's office today for persistent cough and dyspnea. Patient states that since she received her Betamethasone shot for (fetal lung maturity) has been experiencing a persistent cough, and feels like the mucous is stuck in her chest. Patient was also started on penicillin for a UTI and GBS, tolerating well. Patient states that the symptoms have been persistently worsening for the last week and a 1/2, and her OB advised her to come to the L&D to be evaluated. Patient is scheduled for  on Monday. Patient is currently resting comfortably, notes some mild leg swelling, but thinks it is only related to her pregnancy. Patient denies any fevers, chills, CP, persistent palpitations, ICD shock, syncope, near syncope, abdominal pain, N/V/D, headache, or dizziness.     PAST MEDICAL & SURGICAL HISTORY:  Pre-eclampsia, antepartum:   Hypertrophic cardiomyopathy: Pacemaker -  Palpitations  Hyperlipidemia  Preeclampsia  Seizure disorder  Cardiac pacemaker: -  H/O:       PREVIOUS DIAGNOSTIC TESTING:      ECHO  FINDINGS:  Echo: 7/10/17, LVEF 72%, asymmetric septal hypertrophy c/w HCM with outflow tract obstruction at rest (88mmHg up to 100mmHg post Valsalva) LA 4.4cm, ANGE with mild –mod eccentric MR, trace TR     STRESS  FINDINGS:  Stress Test: 4/17/15, normal. Nml EF and wall motion. BP increased from 108//80.     Allergies    codeine (Vomiting)  sulfa drugs (Seizure)    Intolerances    predniSONE (Other)      MEDICATIONS  (STANDING):    Home Medications:  metoprolol succinate 100 mg oral tablet, extended release: 1 tab(s) orally 2 times a day (23 Oct 2019 12:53)  prenatal vitamins: once a day (23 Oct 2019 12:53)  simvastatin 20 mg oral tablet: 1 tab(s) orally once a day (at bedtime) (23 Oct 2019 12:53)        FAMILY HISTORY:  2 Family members with HCM with history of Syncope and ICD implantation  Uncle sudden death in his 50s, but apparently autopsy revealed acute MI.     SOCIAL HISTORY:     CIGARETTES: Former smoker, 1/2 PPD x 15 years    ALCOHOL: Nothing currently    DRUGS: Denies    REVIEW OF SYSTEMS:  CONSTITUTIONAL: No fever, weight loss, or fatigue  Cardiovascular: AS PER HPI  Respiratory:  AS PER HPI  Genitourinary:  No dysuria, no hematuria;   Gastrointestinal:   No dark color stool, no melena, no diarrhea, no constipation, no abdominal pain;   Neurological: No headache, no dizziness, no slurred speech;    Psychiatric: No agitation, no anxiety.    ALL OTHER REVIEW OF SYSTEMS ARE NEGATIVE.    Vital Signs Last 24 Hrs  T(C): 36.6 (2019 12:51), Max: 36.6 (2019 12:51)  T(F): 97.9 (2019 12:51), Max: 97.9 (2019 12:51)  HR: 71 (2019 13:51) (71 - 90)  BP: 119/88 (2019 12:51) (119/88 - 119/88)  RR: 18 (2019 12:51) (18 - 18)  SpO2: 99% (2019 13:51) (94% - 100%)    PHYSICAL EXAM:  Appearance: Normal, well nourished	  HEENT:   Normal oral mucosa, PERRL, EOMI, sclera non-icteric	  Lymphatic: No cervical lymphadenopathy  Cardiovascular: Normal S1 S2, No JVD, II/VI systolic murmur at apex,, No carotid bruits, Trace peripheral edema  Respiratory: Coarse rhonchi upper airways  Psychiatry: A & O x 3, Mood & affect appropriate  Gastrointestinal:  Soft, Non-tender, + BS, no bruits	  Skin: No rashes, No ecchymoses, No cyanosis  Neurologic: Grossly non-focal with full strength in all four extremities  Extremities: Normal range of motion, No clubbing, cyanosis, trace LE edema   Vascular: Peripheral pulses palpable 2+ bilaterally      INTERPRETATION OF TELEMETRY: Not on telemetry     ECG: NSR, LVH with repolarization abnormalities     LABS: Pending                  I&O's Summary    BNP    RADIOLOGY & ADDITIONAL STUDIES:

## 2019-11-01 NOTE — CONSULT NOTE ADULT - ASSESSMENT
A/P: 38 y/o  at 37 weeks with a PMHx of hypertrophic CM with outflow tract obstruction with NSVT s/p primary prevention ICD ([place] Sci ), HLD, and obesity who was sent from her doctor's office today for persistent cough and dyspnea. Patient states that since she received her Betamethasone shot for (fetal lung maturity) has been experiencing a persistent cough, and feels like the mucous is stuck in her chest. Patient states that the symptoms have been persistently worsening for the last week and a 1/2, and her OB advised her to come to the L&D to be evaluated. Patient is scheduled for  on Monday. Patient is currently resting comfortably, notes some mild leg swelling, but thinks it is only related to her pregnancy. Patient denies any fevers, chills, CP, persistent palpitations, ICD shock, syncope, near syncope, abdominal pain, N/V/D, headache, or dizziness.     1. Dyspnea and Cough  - CBC and BNP pending   - CXR pending to evaluate for pulmonary edema or any infiltrates  - Obtain TTE to evaluate BiV function and MR  - Doesn't appear to be fluid overloaded on exam  - Pulm consulted as well  - If no acute findings of decompensated heart failure, can go home and return for planned  on Monday     2. NSVT   - S/p [place] Sci primary prevention ICD   - Interrogated with no events   - Continue Toprol XL 100mg PO BID (unable to tolerate labetalol)  - Will need to have device turned off during the , and turned on again promptly right afterwards. [employer] aware and ready for 19  - If patient needs to go emergently beforehand, can contact [employer] directly, or use the magnet available in the OR A/P: 38 y/o  at 37 weeks with a PMHx of hypertrophic CM with outflow tract obstruction with NSVT s/p primary prevention ICD ([place] Sci ), HLD, and obesity who was sent from her doctor's office today for persistent cough and dyspnea. Patient states that since she received her Betamethasone shot for (fetal lung maturity) has been experiencing a persistent cough, and feels like the mucous is stuck in her chest. Patient was also started on penicillin for a UTI and GBS, tolerating well. Patient states that the symptoms have been persistently worsening for the last week and a 1/2, and her OB advised her to come to the L&D to be evaluated. Patient is scheduled for  on Monday. Patient is currently resting comfortably, notes some mild leg swelling, but thinks it is only related to her pregnancy. Patient denies any fevers, chills, CP, persistent palpitations, ICD shock, syncope, near syncope, abdominal pain, N/V/D, headache, or dizziness.     1. Dyspnea and Cough  - CBC and BNP pending   - CXR pending to evaluate for pulmonary edema or any infiltrates  - Obtain TTE to evaluate BiV function and MR  - Doesn't appear to be fluid overloaded on exam  - Pulm consulted as well  - If no acute findings of decompensated heart failure, can go home and return for planned  on Monday     2. NSVT   - S/p [place] [employer] primary prevention ICD   - Interrogated with no events   - Continue Toprol XL 100mg PO BID (unable to tolerate labetalol)  - Will need to have device turned off during the , and turned on again promptly right afterwards. [employer] aware and ready for 19  - If patient needs to go emergently beforehand, can contact [employer] directly, or use the magnet available in the OR

## 2019-11-01 NOTE — OB RN TRIAGE NOTE - NS_TRIAGEADDITIONAL COMMENTS_OBGYN_ALL_OB_FT
Pt was seen by Pulmonology, and cardiology. Pt to have echo and chest Xray. CBC, CMP, and BNP ordered.

## 2019-11-01 NOTE — CONSULT NOTE ADULT - ASSESSMENT
37 weeks pregnant. Cough, congestion major symptom; more than sob. Given hx, r/o pulm edema vs viral bronchitis. Doubt PE. Hx HOCM. Smoker until this February. Cardiology eval being done.     RECC:  CXR. ECHO. RVP. CBC. BMP. Further reccs will come.

## 2019-11-01 NOTE — OB PROVIDER TRIAGE NOTE - NSOBPROVIDERNOTE_OBGYN_ALL_OB_FT
38 yo  at 37w PMHx of Hypertrophic Cardiomyopathy sent from doctor's office due to shortness of breath, cough.  Pt saturating well on RA and comfortable on evaluation. CBC shows mild anemia. BNP is elevated. CXR showed cardiomegaly. Echo shows left ventrical hypertrophic cardiomyopatic with preserved ejection fraction, MR. Negative CMP, CXR, RVP.    Pulmonologist and Cardiologist noted appreciated.  Results discussed with Dr. Mckenzie, who recommended discharge due to unchanged results from baseline and come back if condition worsened and/or scheduled C/S.    Case discussed with Attending on Call, Dr. Mckenzie.    -Cedric Singleton MD;  PGY-2

## 2019-11-01 NOTE — OB PROVIDER TRIAGE NOTE - NSHPPHYSICALEXAM_GEN_ALL_CORE
Vital Signs Last 24 Hrs  T(C): 36.6 (01 Nov 2019 12:51), Max: 36.6 (01 Nov 2019 12:51)  T(F): 97.9 (01 Nov 2019 12:51), Max: 97.9 (01 Nov 2019 12:51)  HR: 71 (01 Nov 2019 13:41) (71 - 90)  BP: 119/88 (01 Nov 2019 12:51) (119/88 - 119/88)  RR: 18 (01 Nov 2019 12:51) (18 - 18)  SpO2: 99% (01 Nov 2019 13:36) (94% - 100%)    Physical Exam  Gen: A&Ox3, NAD  CV: RRR, grade II holosystolic murmur heard loudest at right 2nd intercostal space  Lungs: course breath sounds in anterior lung fields, rales b/l posterior lung bases  Abd: +bs, gravid, NT  Ext: NT, trace b/l edema    FHT: 125, moderate variability, +accelerations  [place]: uterine irritability

## 2019-11-03 ENCOUNTER — TRANSCRIPTION ENCOUNTER (OUTPATIENT)
Age: 39
End: 2019-11-03

## 2019-11-04 ENCOUNTER — INPATIENT (INPATIENT)
Facility: HOSPITAL | Age: 39
LOS: 2 days | Discharge: HOME | End: 2019-11-07
Attending: OBSTETRICS & GYNECOLOGY | Admitting: OBSTETRICS & GYNECOLOGY
Payer: MEDICAID

## 2019-11-04 ENCOUNTER — RESULT REVIEW (OUTPATIENT)
Age: 39
End: 2019-11-04

## 2019-11-04 ENCOUNTER — APPOINTMENT (OUTPATIENT)
Dept: ANTEPARTUM | Facility: CLINIC | Age: 39
End: 2019-11-04

## 2019-11-04 VITALS — HEART RATE: 81 BPM | SYSTOLIC BLOOD PRESSURE: 135 MMHG | DIASTOLIC BLOOD PRESSURE: 64 MMHG

## 2019-11-04 DIAGNOSIS — O34.219 MATERNAL CARE FOR UNSPECIFIED TYPE SCAR FROM PREVIOUS CESAREAN DELIVERY: ICD-10-CM

## 2019-11-04 DIAGNOSIS — Z98.891 HISTORY OF UTERINE SCAR FROM PREVIOUS SURGERY: Chronic | ICD-10-CM

## 2019-11-04 DIAGNOSIS — Z95.0 PRESENCE OF CARDIAC PACEMAKER: Chronic | ICD-10-CM

## 2019-11-04 LAB
BLD GP AB SCN SERPL QL: SIGNIFICANT CHANGE UP
T PALLIDUM AB TITR SER: NEGATIVE — SIGNIFICANT CHANGE UP

## 2019-11-04 PROCEDURE — 88302 TISSUE EXAM BY PATHOLOGIST: CPT | Mod: 26

## 2019-11-04 PROCEDURE — 88307 TISSUE EXAM BY PATHOLOGIST: CPT | Mod: 26

## 2019-11-04 RX ORDER — SODIUM CHLORIDE 9 MG/ML
1000 INJECTION, SOLUTION INTRAVENOUS
Refills: 0 | Status: DISCONTINUED | OUTPATIENT
Start: 2019-11-04 | End: 2019-11-07

## 2019-11-04 RX ORDER — OXYTOCIN 10 UNIT/ML
333.33 VIAL (ML) INJECTION
Qty: 20 | Refills: 0 | Status: DISCONTINUED | OUTPATIENT
Start: 2019-11-04 | End: 2019-11-07

## 2019-11-04 RX ORDER — TETANUS TOXOID, REDUCED DIPHTHERIA TOXOID AND ACELLULAR PERTUSSIS VACCINE, ADSORBED 5; 2.5; 8; 8; 2.5 [IU]/.5ML; [IU]/.5ML; UG/.5ML; UG/.5ML; UG/.5ML
0.5 SUSPENSION INTRAMUSCULAR ONCE
Refills: 0 | Status: COMPLETED | OUTPATIENT
Start: 2019-11-04

## 2019-11-04 RX ORDER — OXYTOCIN 10 UNIT/ML
333.33 VIAL (ML) INJECTION
Qty: 20 | Refills: 0 | Status: DISCONTINUED | OUTPATIENT
Start: 2019-11-04 | End: 2019-11-06

## 2019-11-04 RX ORDER — SIMETHICONE 80 MG/1
80 TABLET, CHEWABLE ORAL EVERY 4 HOURS
Refills: 0 | Status: DISCONTINUED | OUTPATIENT
Start: 2019-11-04 | End: 2019-11-07

## 2019-11-04 RX ORDER — MAGNESIUM HYDROXIDE 400 MG/1
30 TABLET, CHEWABLE ORAL
Refills: 0 | Status: DISCONTINUED | OUTPATIENT
Start: 2019-11-04 | End: 2019-11-07

## 2019-11-04 RX ORDER — SODIUM CHLORIDE 9 MG/ML
1000 INJECTION, SOLUTION INTRAVENOUS ONCE
Refills: 0 | Status: DISCONTINUED | OUTPATIENT
Start: 2019-11-04 | End: 2019-11-04

## 2019-11-04 RX ORDER — FAMOTIDINE 10 MG/ML
20 INJECTION INTRAVENOUS ONCE
Refills: 0 | Status: COMPLETED | OUTPATIENT
Start: 2019-11-04 | End: 2019-11-04

## 2019-11-04 RX ORDER — CEFAZOLIN SODIUM 1 G
2000 VIAL (EA) INJECTION ONCE
Refills: 0 | Status: COMPLETED | OUTPATIENT
Start: 2019-11-04 | End: 2019-11-04

## 2019-11-04 RX ORDER — GLYCERIN ADULT
1 SUPPOSITORY, RECTAL RECTAL AT BEDTIME
Refills: 0 | Status: DISCONTINUED | OUTPATIENT
Start: 2019-11-04 | End: 2019-11-07

## 2019-11-04 RX ORDER — METOPROLOL TARTRATE 50 MG
100 TABLET ORAL EVERY 12 HOURS
Refills: 0 | Status: DISCONTINUED | OUTPATIENT
Start: 2019-11-04 | End: 2019-11-05

## 2019-11-04 RX ORDER — IBUPROFEN 200 MG
600 TABLET ORAL EVERY 6 HOURS
Refills: 0 | Status: COMPLETED | OUTPATIENT
Start: 2019-11-04 | End: 2020-10-02

## 2019-11-04 RX ORDER — SODIUM CHLORIDE 9 MG/ML
1000 INJECTION, SOLUTION INTRAVENOUS
Refills: 0 | Status: DISCONTINUED | OUTPATIENT
Start: 2019-11-04 | End: 2019-11-04

## 2019-11-04 RX ORDER — KETOROLAC TROMETHAMINE 30 MG/ML
30 SYRINGE (ML) INJECTION EVERY 6 HOURS
Refills: 0 | Status: DISCONTINUED | OUTPATIENT
Start: 2019-11-04 | End: 2019-11-06

## 2019-11-04 RX ORDER — LANOLIN
1 OINTMENT (GRAM) TOPICAL EVERY 6 HOURS
Refills: 0 | Status: DISCONTINUED | OUTPATIENT
Start: 2019-11-04 | End: 2019-11-07

## 2019-11-04 RX ORDER — DIPHENHYDRAMINE HCL 50 MG
25 CAPSULE ORAL EVERY 6 HOURS
Refills: 0 | Status: DISCONTINUED | OUTPATIENT
Start: 2019-11-04 | End: 2019-11-07

## 2019-11-04 RX ORDER — ACETAMINOPHEN 500 MG
975 TABLET ORAL
Refills: 0 | Status: DISCONTINUED | OUTPATIENT
Start: 2019-11-04 | End: 2019-11-07

## 2019-11-04 RX ORDER — HYDROMORPHONE HYDROCHLORIDE 2 MG/ML
30 INJECTION INTRAMUSCULAR; INTRAVENOUS; SUBCUTANEOUS
Refills: 0 | Status: DISCONTINUED | OUTPATIENT
Start: 2019-11-04 | End: 2019-11-05

## 2019-11-04 RX ORDER — METOCLOPRAMIDE HCL 10 MG
10 TABLET ORAL ONCE
Refills: 0 | Status: DISCONTINUED | OUTPATIENT
Start: 2019-11-04 | End: 2019-11-04

## 2019-11-04 RX ORDER — CITRIC ACID/SODIUM CITRATE 300-500 MG
30 SOLUTION, ORAL ORAL ONCE
Refills: 0 | Status: COMPLETED | OUTPATIENT
Start: 2019-11-04 | End: 2019-11-04

## 2019-11-04 RX ADMIN — Medication 1000 MILLIUNIT(S)/MIN: at 08:12

## 2019-11-04 RX ADMIN — SODIUM CHLORIDE 125 MILLILITER(S): 9 INJECTION, SOLUTION INTRAVENOUS at 13:08

## 2019-11-04 RX ADMIN — Medication 100 MILLIGRAM(S): at 07:55

## 2019-11-04 RX ADMIN — Medication 100 MILLIGRAM(S): at 23:00

## 2019-11-04 RX ADMIN — FAMOTIDINE 20 MILLIGRAM(S): 10 INJECTION INTRAVENOUS at 07:41

## 2019-11-04 RX ADMIN — HYDROMORPHONE HYDROCHLORIDE 30 MILLILITER(S): 2 INJECTION INTRAMUSCULAR; INTRAVENOUS; SUBCUTANEOUS at 11:45

## 2019-11-04 RX ADMIN — Medication 30 MILLIGRAM(S): at 18:41

## 2019-11-04 RX ADMIN — Medication 30 MILLILITER(S): at 07:41

## 2019-11-04 RX ADMIN — HYDROMORPHONE HYDROCHLORIDE 30 MILLILITER(S): 2 INJECTION INTRAMUSCULAR; INTRAVENOUS; SUBCUTANEOUS at 19:14

## 2019-11-04 RX ADMIN — Medication 975 MILLIGRAM(S): at 14:37

## 2019-11-04 RX ADMIN — HYDROMORPHONE HYDROCHLORIDE 30 MILLILITER(S): 2 INJECTION INTRAMUSCULAR; INTRAVENOUS; SUBCUTANEOUS at 09:56

## 2019-11-04 RX ADMIN — SODIUM CHLORIDE 125 MILLILITER(S): 9 INJECTION, SOLUTION INTRAVENOUS at 07:42

## 2019-11-04 RX ADMIN — Medication 30 MILLIGRAM(S): at 18:26

## 2019-11-04 NOTE — OB PROVIDER H&P - NS_OBGYNHISTORY_OBGYN_ALL_OB_FT
-pCS (2012) – Male – 5lbs 5 oz (Pregnancy complicated by pre-eclampsia; son has biliary atresia)    -eTOP

## 2019-11-04 NOTE — OB PROVIDER H&P - NSHPPHYSICALEXAM_GEN_ALL_CORE
General: AOx3, NAD  Abd: Soft, nontender, gravid  BMI (kg/m2): 34.3 (11-04-19 @ 06:38)        FHT: 130 bpm, moderate variability +accels, no decelerations present - category I tracing.  [place]: No CTXs  Sono: Vertex

## 2019-11-04 NOTE — OB PROVIDER H&P - HISTORY OF PRESENT ILLNESS
Patient is a 38yo  at 37wk3d c/w LMP who presents to L&D for a rCS & BTL  WOJCIECH: 2019 (2019 @ 6.5 weeks 19)  LMP: 02/15/2019    Prenatal course complicated by:   -	Idiopathic hypertrophic subaortic stenosis (IHSS) w/ outflow tract obstruction  -	Elevated Inhibin A (>95%)  -	Fetal growth restriction (2211g, 4%tile)   -	AMA  -	Obesity  -	Prior C/S due to pre-eclampsia and history of epilepsy    Cardiac profile   -	EKG 19: NSR, narrow QRS, criteria for LVH and diffuse TW abnormalities  -	Stress test: 04/17/15: Normal EF and wall motion  Echo: TTE 19 LVEF 75%, LA 5.1cm, HCM with outflow tract obstruction at rest (95mmHg increasing to 107mmHg with Valsalva), mod eccentric MR, trace TR, RVSP 35-40   Betamethasone complete (10/2319, 10/24/19)    OBHx:   -	pCS () – Male – 5lbs 5 oz (Pregnancy complicated by pre-eclampsia; son has biliary atresia)    -	eTOP  PMH: Seizures in childhood, Hypertrophic cardiomyopathy (a/ ICD), Arrhythmia, GERD, Hyperlipidemia, NSVT  PSH: C/S x 1 (), Loop recorder placement (), Subcutaneous ICD placement () , Ear tubes ()  Meds: Metoprolol 100mg BID, PNV, Flonase, Pantoprazole sodium 40mg QD, Simvastatin 20mg qhs  ALL: Codeine (nausea/vomiting), Sulfa (hyperactive), Prednisone (tachycardia), Nicoderm  Sono: Vertex, posterior placenta             EFW: 2211g                   BMI: 35.95    GBS: Positive  HIV: NR  RPR: NR  Rubella: Immune   HepB: NR  ABO: A+ Patient is a 40yo  at 37wk3d c/w LMP who presents to L&D for a rCS & BTL    No acute complaints at this time. Denies any VB, LOF or contractions. Reporting good fetal movement    WOJCIECH: 2019 (2019 @ 6.5 weeks 19)  LMP: 02/15/2019    Prenatal course complicated by:   -	Idiopathic hypertrophic subaortic stenosis (IHSS) w/ outflow tract obstruction  -	Elevated Inhibin A (>95%)  -	Fetal growth restriction (2211g, 4%tile)   -	AMA  -	Obesity  -	Prior C/S due to pre-eclampsia and history of epilepsy    Cardiac profile   -	EKG 19: NSR, narrow QRS, criteria for LVH and diffuse TW abnormalities  -	Stress test: 04/17/15: Normal EF and wall motion  Echo: TTE 19 LVEF 75%, LA 5.1cm, HCM with outflow tract obstruction at rest (95mmHg increasing to 107mmHg with Valsalva), mod eccentric MR, trace TR, RVSP 35-40   Betamethasone complete (10/2319, 10/24/19)    OBHx:   -	pCS () – Male – 5lbs 5 oz (Pregnancy complicated by pre-eclampsia; son has biliary atresia)    -	eTOP  PMH: Seizures in childhood, Hypertrophic cardiomyopathy (a/ ICD), Arrhythmia, GERD, Hyperlipidemia, NSVT  PSH: C/S x 1 (), Loop recorder placement (), Subcutaneous ICD placement () , Ear tubes ()  Meds: Metoprolol 100mg BID, PNV, Flonase, Pantoprazole sodium 40mg QD, Simvastatin 20mg qhs  ALL: Codeine (nausea/vomiting), Sulfa (hyperactive), Prednisone (tachycardia), Nicoderm  Sono: Vertex, posterior placenta             EFW: 2211g                   BMI: 35.95    GBS: Positive  HIV: NR  RPR: NR  Rubella: Immune   HepB: NR  ABO: A+ Patient is a 40yo  at 37wk3d c/w LMP who presents to L&D for a rCS & BTL    No acute complaints at this time. Denies any VB, LOF or contractions. Reporting good fetal movement    WOJCIECH: 2019 (2019 @ 6.5 weeks 19)  LMP: 02/15/2019    Prenatal course complicated by:   -	Idiopathic hypertrophic subaortic stenosis (IHSS) w/ outflow tract obstruction  -	Elevated Inhibin A (>95%)  -	Fetal growth restriction (2211g, 4%tile)   -	AMA  -	Obesity  -	Prior C/S due to pre-eclampsia and history of epilepsy    Cardiac profile   -	EKG 19: NSR, narrow QRS, criteria for LVH and diffuse TW abnormalities  -	Stress test: 04/17/15: Normal EF and wall motion  Echo: TTE 19 LVEF 75%, LA 5.1cm, HCM with outflow tract obstruction at rest (95mmHg increasing to 107mmHg with Valsalva), mod eccentric MR, trace TR, RVSP 35-40   Betamethasone complete (10/2319, 10/24/19)    OBHx:   -	pCS () – Male – 5lbs 5 oz (Pregnancy complicated by pre-eclampsia; son has biliary atresia)    -	eTOP  PMH: Seizures in childhood, Hypertrophic cardiomyopathy (a/ ICD), Arrhythmia, GERD, Hyperlipidemia, NSVT  PSH: C/S x 1 (), Loop recorder placement (), Subcutaneous ICD placement () , Ear tubes ()  Meds: Metoprolol 100mg BID, PNV, Flonase   ALL: Codeine (nausea/vomiting), Sulfa (hyperactive), Prednisone (tachycardia), Nicoderm  Sono: Vertex, posterior placenta             EFW: 2211g                   BMI: 35.95    GBS: Positive  HIV: NR  RPR: NR  Rubella: Immune   HepB: NR  ABO: A+

## 2019-11-04 NOTE — OB NEONATOLOGY/PEDIATRICIAN DELIVERY SUMMARY - NSPEDSNEONOTESA_OBGYN_ALL_OB_FT
Called to OR # 1 by Dr. Mckenzie to attend this repeat C/S delivery. Mother had + PNC is a 40 y/o  at 37.3wks gestation.  Blood type A positive, HIV negative, Rubella Immune, GBS negative, Serology non reactive HBsAg non reactive.  EDC 2019.  Allergic to codeine, sulfa and hypereative to Prednisone.  Maternal history significant for seizures in childhood, hypertrophic cardiomyopathy (pacemaker discontinued 2019) .   Labor and Delivery: AROM 2019 at C/S with clear amniotic fluid.  Infant delivered vertex presentation, 2019@ 0812 hours.  Agpar score 9 and 9 at 1 and 5minutes respectively. Transfer to warmer  orally suctioned and  dried.  Infant with good cry  but so many secretions saturation HR down to  was intubated size 2.5 with no change in color was reintubated with size 3 ETT good color change received an Apgar score of 8,6 and 9 at 1.5 and 10 minutes. Physical exam was done is wnl and showed to auntie. Transfer to NICU for further care and  management. Called to OR # 1 by Dr. Mckenzie to attend this repeat C/S delivery. Mother had + PNC is a 40 y/o  at 37.3wks gestation.  Blood type A positive, HIV negative, Rubella Immune, GBS negative, Serology non reactive HBsAg non reactive.  EDC 2019.  Allergic to codeine, sulfa and hypereative to Prednisone.  Maternal history significant for seizures in childhood, hypertrophic cardiomyopathy (pacemaker discontinued 2019) .   Labor and Delivery: AROM 2019 at C/S with clear amniotic fluid.  Infant delivered vertex presentation, 2019@ 0812 hours.  Apgar score 9 and 9 at 1 and 5minutes respectively. Transfer to warmer  orally suctioned and  dried.  Physical exam was done is wnl. Transfer to NICU for further care and  management.

## 2019-11-04 NOTE — OB PROVIDER H&P - ASSESSMENT
Patient is a 38yo  at 37wk3d c/w LMP admitted for a scheduled rCS & BTL.     - Admit to L&D  - Consent at bedside  - 1L IVF  - Preop antibiotics  - Fetal monitoring  - Needs PACEMAKER DEACTIVATION PRIOR TO SURGERY   - REVIEW CARDIO & MFM NOTES IN CHART Patient is a 40yo  at 37wk3d c/w LMP admitted for a scheduled rCS & BTL. Patient has hx of idiopathic hypertrophic subarotic stenosis w/ outflow tract obstruction      PLAN:  - Admit to L&D  - Consent at bedside  - 1L IVF  - Preop antibiotics  - Fetal monitoring  - Needs PACEMAKER DEACTIVATION PRIOR TO SURGERY; rep will be at bedside   - REVIEW CARDIO & MFM NOTES IN CHART

## 2019-11-04 NOTE — OB PROVIDER DELIVERY SUMMARY - NSPROVIDERDELIVERYNOTE_OBGYN_ALL_OB_FT
rLTCS under general anesthesia for HCM  AICD deactivated by [employer] rep  Viable female infant   Apgars 9/9  Weight 1950g

## 2019-11-04 NOTE — CHART NOTE - NSCHARTNOTEFT_GEN_A_CORE
S:   Patient seen and examined at bedside.  Patient reporting discomfort and irritation with the brown. States that it feels like the brown is being "pulled out" when she is ambulating  States whenever she has a brown placed after a procedure, she gets a UTI and does not want that to happen again.      ICU Vital Signs Last 24 Hrs  T(C): 36.4 (04 Nov 2019 16:30), Max: 36.7 (04 Nov 2019 10:58)  T(F): 97.5 (04 Nov 2019 16:30), Max: 98.1 (04 Nov 2019 10:58)  HR: 76 (04 Nov 2019 16:30) (65 - 81)  BP: 99/65 (04 Nov 2019 16:30) (99/65 - 135/64)  RR: 20 (04 Nov 2019 16:30) (12 - 21)  SpO2: 96% (04 Nov 2019 16:30) (96% - 100%)      Gen: NAD, AAOx3  CV: RRR, no M/R/G  Pulm: CTAB, no R/R/W  Abd: soft, nontender, no rebound or guarding, no epigastric tenderness, liver nonpalpable +BS, fundus palpated   : Brown in place      11-04-19 @ 07:01  -  11-04-19 @ 18:01  --------------------------------------------------------  IN: 1400 mL / OUT: 775 mL / NET: 625 mL S:   Patient seen and examined at bedside.  Patient reporting discomfort and irritation with the brown. States that it feels like the brown is being "pulled out" when she is ambulating  States whenever she has a brown placed after a procedure, she gets a UTI and does not want that to happen again.  Otherwise, no additional complaints at this time.       ICU Vital Signs Last 24 Hrs  T(C): 36.4 (04 Nov 2019 16:30), Max: 36.7 (04 Nov 2019 10:58)  T(F): 97.5 (04 Nov 2019 16:30), Max: 98.1 (04 Nov 2019 10:58)  HR: 76 (04 Nov 2019 16:30) (65 - 81)  BP: 99/65 (04 Nov 2019 16:30) (99/65 - 135/64)  RR: 20 (04 Nov 2019 16:30) (12 - 21)  SpO2: 96% (04 Nov 2019 16:30) (96% - 100%)      Gen: NAD, AAOx3  CV: RRR, no M/R/G  Pulm: CTAB, no R/R/W  Abd: soft, nontender, no rebound or guarding, no epigastric tenderness, liver nonpalpable +BS, fundus palpated   : Brown in place      11-04-19 @ 07:01  -  11-04-19 @ 18:01  --------------------------------------------------------  IN: 1400 mL / OUT: 775 mL / NET: 625 mL      Output: 46.4cc/hr since 7hours/ .6cc/hr        PLAN:  - S:   Patient seen and examined at bedside.  Patient reporting discomfort and irritation with the brown. States that it feels like the brown is being "pulled out" when she is ambulating  States whenever she has a brown placed after a procedure, she gets a UTI and does not want that to happen again.  Otherwise, no additional complaints at this time.       ICU Vital Signs Last 24 Hrs  T(C): 36.4 (04 Nov 2019 16:30), Max: 36.7 (04 Nov 2019 10:58)  T(F): 97.5 (04 Nov 2019 16:30), Max: 98.1 (04 Nov 2019 10:58)  HR: 76 (04 Nov 2019 16:30) (65 - 81)  BP: 99/65 (04 Nov 2019 16:30) (99/65 - 135/64)  RR: 20 (04 Nov 2019 16:30) (12 - 21)  SpO2: 96% (04 Nov 2019 16:30) (96% - 100%)      Gen: NAD, AAOx3  CV: RRR, no M/R/G  Pulm: CTAB, no R/R/W  : Brown in place      11-04-19 @ 07:01  -  11-04-19 @ 18:01  --------------------------------------------------------  IN: 1400 mL / OUT: 775 mL / NET: 625 mL      Output: 46.4cc/hr         PLAN:  - Will d/c brown; will continue to monitor urine output overnight   - Metoprolol 100mg BID parameters set; will continue to watch BP overnight    Plan d/w Dr. Mckenzie

## 2019-11-04 NOTE — OB RN DELIVERY SUMMARY - NS_SEPSISRSKCALC_OBGYN_ALL_OB_FT
EOS calculated successfully. EOS Risk Factor: 0.5/1000 live births ([facility] national incidence); GA=37w3d; Temp=97.7; ROM=0; GBS='Unknown'; Antibiotics='No antibiotics or any antibiotics < 2 hrs prior to birth'

## 2019-11-04 NOTE — OB RN PATIENT PROFILE - NS_NUMBOFVISITS_OBGYN_ALL_OB_NU
Contacted mom   Mom states that pt has had baby acne on face since birth and it has now cleared up on face, but is now spreading a little bit to back and chest \"little red bumps\"  Pt is also very fussy during feeding and having colic   Mom is breast feed 12

## 2019-11-05 LAB
BASOPHILS # BLD AUTO: 0.02 K/UL — SIGNIFICANT CHANGE UP (ref 0–0.2)
BASOPHILS NFR BLD AUTO: 0.2 % — SIGNIFICANT CHANGE UP (ref 0–2)
EOSINOPHIL # BLD AUTO: 0.04 K/UL — SIGNIFICANT CHANGE UP (ref 0–0.5)
EOSINOPHIL NFR BLD AUTO: 0.3 % — SIGNIFICANT CHANGE UP (ref 0–6)
HCT VFR BLD CALC: 25.7 % — LOW (ref 34.5–45)
HGB BLD-MCNC: 7.7 G/DL — LOW (ref 11.5–15.5)
IMM GRANULOCYTES NFR BLD AUTO: 0.4 % — SIGNIFICANT CHANGE UP (ref 0–1.5)
LYMPHOCYTES # BLD AUTO: 1.8 K/UL — SIGNIFICANT CHANGE UP (ref 1–3.3)
LYMPHOCYTES # BLD AUTO: 15.2 % — SIGNIFICANT CHANGE UP (ref 13–44)
MCHC RBC-ENTMCNC: 25.7 PG — LOW (ref 27–34)
MCHC RBC-ENTMCNC: 30 GM/DL — LOW (ref 32–36)
MCV RBC AUTO: 85.7 FL — SIGNIFICANT CHANGE UP (ref 80–100)
MONOCYTES # BLD AUTO: 0.83 K/UL — SIGNIFICANT CHANGE UP (ref 0–0.9)
MONOCYTES NFR BLD AUTO: 7 % — SIGNIFICANT CHANGE UP (ref 2–14)
NEUTROPHILS # BLD AUTO: 9.13 K/UL — HIGH (ref 1.8–7.4)
NEUTROPHILS NFR BLD AUTO: 76.9 % — SIGNIFICANT CHANGE UP (ref 43–77)
PLATELET # BLD AUTO: 147 K/UL — LOW (ref 150–400)
RBC # BLD: 3 M/UL — LOW (ref 3.8–5.2)
RBC # FLD: 16.7 % — HIGH (ref 10.3–14.5)
WBC # BLD: 11.87 K/UL — HIGH (ref 3.8–10.5)
WBC # FLD AUTO: 11.87 K/UL — HIGH (ref 3.8–10.5)

## 2019-11-05 PROCEDURE — 99223 1ST HOSP IP/OBS HIGH 75: CPT

## 2019-11-05 RX ORDER — METOPROLOL TARTRATE 50 MG
200 TABLET ORAL AT BEDTIME
Refills: 0 | Status: DISCONTINUED | OUTPATIENT
Start: 2019-11-05 | End: 2019-11-07

## 2019-11-05 RX ORDER — DOCUSATE SODIUM 100 MG
100 CAPSULE ORAL
Refills: 0 | Status: DISCONTINUED | OUTPATIENT
Start: 2019-11-05 | End: 2019-11-07

## 2019-11-05 RX ADMIN — Medication 200 MILLIGRAM(S): at 22:53

## 2019-11-05 RX ADMIN — Medication 975 MILLIGRAM(S): at 16:15

## 2019-11-05 RX ADMIN — Medication 975 MILLIGRAM(S): at 17:15

## 2019-11-05 RX ADMIN — Medication 975 MILLIGRAM(S): at 23:50

## 2019-11-05 RX ADMIN — Medication 975 MILLIGRAM(S): at 10:51

## 2019-11-05 RX ADMIN — Medication 100 MILLIGRAM(S): at 10:09

## 2019-11-05 RX ADMIN — Medication 30 MILLIGRAM(S): at 13:38

## 2019-11-05 RX ADMIN — Medication 1 TABLET(S): at 13:23

## 2019-11-05 RX ADMIN — Medication 30 MILLIGRAM(S): at 13:23

## 2019-11-05 RX ADMIN — Medication 30 MILLIGRAM(S): at 18:15

## 2019-11-05 RX ADMIN — Medication 975 MILLIGRAM(S): at 22:53

## 2019-11-05 RX ADMIN — Medication 30 MILLIGRAM(S): at 02:22

## 2019-11-05 RX ADMIN — HYDROMORPHONE HYDROCHLORIDE 30 MILLILITER(S): 2 INJECTION INTRAMUSCULAR; INTRAVENOUS; SUBCUTANEOUS at 07:29

## 2019-11-05 RX ADMIN — Medication 30 MILLIGRAM(S): at 18:30

## 2019-11-05 RX ADMIN — Medication 30 MILLIGRAM(S): at 02:37

## 2019-11-05 RX ADMIN — Medication 975 MILLIGRAM(S): at 10:09

## 2019-11-05 NOTE — PROGRESS NOTE ADULT - SUBJECTIVE AND OBJECTIVE BOX
POD # 1    Patient in NICU breast feeding the baby  Afebrile   VSS  Voiding ++  FLatus +  BM Neg    WBC 11.8  H/H 7.7 25.7  Plat. 147  O ++  VDRL Neh  HIV Neg    Patient s/p R c/s Tubal excision  Hx of cardiomyopathy

## 2019-11-05 NOTE — PROGRESS NOTE ADULT - SUBJECTIVE AND OBJECTIVE BOX
GABRIELA LESLIE is a 38 yo  now POD#1 s/p repeat  section and BTL @ 37wk3d due to due to HOCM (EF 60-65%), C/S under general, tap block. Baby in NICU    S:    Patient seen and examined at bedside this AM  She is doing well, has no complaints.  Pain is well controlled with PCA pump.  She is ambulating without minimal difficulty and tolerating PO.   + flatus/-BM   Denies any CP, SOB, headaches, fevers, or chills overnight.     O:    T(C): 36.9 (19 @ 00:50), Max: 36.9 (19 @ 20:32)  HR: 84 (19 @ 00:50) (65 - 87)  BP: 100/59 (19 @ 00:50) (96/60 - 135/64)  RR: 18 (19 @ 00:50) (12 - 21)  SpO2: 95% (19 @ 00:50) (95% - 100%)      PE:  Breast: Nontender, not engorged   Abdomen:  Soft, appropriately-tender, non-distended, +bowel sounds.  Uterus:  Fundus firm below umbilicus  Incision:  Steri strips in place. Clean/dry/intact  VE:  +lochia  Ext:  Non-tender, no edema

## 2019-11-05 NOTE — CONSULT NOTE ADULT - SUBJECTIVE AND OBJECTIVE BOX
39 year old female patient with a history of hypertrophic CM with outflow tract obstruction with NSVT s/p primary prevention ICD ([place] [employer] ), HLD, and obesity who is s/p  birth yesterday. Previously seen by cardiology for persistent cough and dyspnea. Patient states her cough has improved but reports she is still bringing up mucus after delivery. Patient is otherwise feeling well and denies any acute symptoms. Patient denies any fevers, chills, CP, persistent palpitations, ICD shock, syncope, near syncope, abdominal pain, N/V/D, headache, or dizziness.     PAST MEDICAL & SURGICAL HISTORY:  Pre-eclampsia, antepartum:   Hypertrophic cardiomyopathy: Pacemaker 4-17  Palpitations  Hyperlipidemia  Preeclampsia  Seizure disorder  Cardiac pacemaker: 4-17  H/O:     REVIEW OF SYSTEMS  General: - fever or chills  Skin/Breast: - rashes  Ophthalmologic: - blurred vision	  ENMT: - sore throat  Respiratory and Thorax: +productive cough. improved BETANCOURT	  Cardiovascular: - chest pain or palpitations  Gastrointestinal: - N/V/D/C  Genitourinary: - dysuria  Musculoskeletal:	 - joint pain  Neurological: - weaknesses  Psychiatric: - anxiety or depression    MEDICATIONS  (STANDING):  acetaminophen   Tablet .. 975 milliGRAM(s) Oral <User Schedule>  diphtheria/tetanus/pertussis (acellular) Vaccine (ADAcel) 0.5 milliLiter(s) IntraMuscular once  docusate sodium 100 milliGRAM(s) Oral two times a day  ibuprofen  Tablet. 600 milliGRAM(s) Oral every 6 hours  ketorolac   Injectable 30 milliGRAM(s) IV Push every 6 hours  lactated ringers. 1000 milliLiter(s) (125 mL/Hr) IV Continuous <Continuous>  metoprolol succinate  milliGRAM(s) Oral at bedtime  oxytocin Infusion 333.333 milliUNIT(s)/Min (1000 mL/Hr) IV Continuous <Continuous>  oxytocin Infusion 333.333 milliUNIT(s)/Min (1000 mL/Hr) IV Continuous <Continuous>  prenatal multivitamin 1 Tablet(s) Oral daily    MEDICATIONS  (PRN):  diphenhydrAMINE 25 milliGRAM(s) Oral every 6 hours PRN Itching  glycerin Suppository - Adult 1 Suppository(s) Rectal at bedtime PRN Constipation  lanolin Ointment 1 Application(s) Topical every 6 hours PRN Sore Nipples  magnesium hydroxide Suspension 30 milliLiter(s) Oral two times a day PRN Constipation  simethicone 80 milliGRAM(s) Chew every 4 hours PRN Gas    Allergies  codeine (Vomiting)  sulfa drugs (Seizure)    Intolerances  predniSONE (Other)    SOCIAL HISTORY:  Former smoker, 1/2 PPD x 15 years    FAMILY HISTORY:  2 Family members with HCM with history of Syncope and ICD implantation  Uncle sudden death in his 50s, but apparently autopsy revealed acute MI.     Vital Signs Last 24 Hrs  T(C): 36.8 (2019 08:17), Max: 36.9 (2019 20:32)  T(F): 98.3 (2019 08:17), Max: 98.4 (2019 20:32)  HR: 96 (2019 08:17) (65 - 96)  BP: 93/63 (2019 08:17) (93/63 - 117/74)  RR: 18 (2019 08:17) (12 - 20)  SpO2: 95% (2019 00:50) (95% - 100%)    Physical Exam:  Constitutional: AAOx3, NAD  Neck: supple, No JVD  Cardiovascular: +S1S2 RRR, Loud 3/6 blowing RAMOS at LSB  Pulmonary: Grossly CTA b/l, unlabored, no wheezes, rales.  Abdomen: +BS, soft NTND  Extremities: trace edema b/l,  Neuro: non focal, speech clear, HOLCOMB x 4  Psych: appropriate mood and affect.     LABS:                        7.7    11.87 )-----------( 147      ( 2019 06:29 )             25.7     RADIOLOGY & ADDITIONAL STUDIES:  Echo: 19  Summary:   1. Technically limited study.   2. Left ventricular ejection fraction, by visual estimation, is 60 to 65%.   3. Normal global left ventricular systolic function.   4. The left ventricular diastolic function could not be assessed in this study.   5. Normal left ventricular internal cavity size.   6. There is moderate asymmetric left ventricular hypertrophy.   7. Normal left atrial size.   8. There is ANGE noted with moderate to severe posteriorly directed MR.   9. There is no evidence of pericardial effusion.     A/P  39 year old female patient with a history of hypertrophic CM with outflow tract obstruction with NSVT s/p primary prevention ICD ([place] [employer] 04/18), HLD, and obesity who is s/p  birth yesterday.    - [place] SubQ ICD therapies were re-activated post delivery  - Ok to continue beta blockers per patient's home routine from EP perspective.   - Follow up as outpatient.

## 2019-11-05 NOTE — CONSULT NOTE ADULT - ATTENDING COMMENTS
Pt doing well s/p Csection. No cardiovascular complaints. ICD reactivated. continue beta blocker as tolerated.

## 2019-11-06 ENCOUNTER — TRANSCRIPTION ENCOUNTER (OUTPATIENT)
Age: 39
End: 2019-11-06

## 2019-11-06 DIAGNOSIS — I42.2 OTHER HYPERTROPHIC CARDIOMYOPATHY: ICD-10-CM

## 2019-11-06 LAB
HCT VFR BLD CALC: 28.7 % — LOW (ref 34.5–45)
HGB BLD-MCNC: 8.7 G/DL — LOW (ref 11.5–15.5)
MCHC RBC-ENTMCNC: 26.4 PG — LOW (ref 27–34)
MCHC RBC-ENTMCNC: 30.3 GM/DL — LOW (ref 32–36)
MCV RBC AUTO: 87 FL — SIGNIFICANT CHANGE UP (ref 80–100)
PLATELET # BLD AUTO: 145 K/UL — LOW (ref 150–400)
RBC # BLD: 3.3 M/UL — LOW (ref 3.8–5.2)
RBC # FLD: 16.4 % — HIGH (ref 10.3–14.5)
WBC # BLD: 8.23 K/UL — SIGNIFICANT CHANGE UP (ref 3.8–10.5)
WBC # FLD AUTO: 8.23 K/UL — SIGNIFICANT CHANGE UP (ref 3.8–10.5)

## 2019-11-06 RX ORDER — ACETAMINOPHEN 500 MG
3 TABLET ORAL
Qty: 84 | Refills: 0
Start: 2019-11-06 | End: 2019-11-12

## 2019-11-06 RX ORDER — TETANUS TOXOID, REDUCED DIPHTHERIA TOXOID AND ACELLULAR PERTUSSIS VACCINE, ADSORBED 5; 2.5; 8; 8; 2.5 [IU]/.5ML; [IU]/.5ML; UG/.5ML; UG/.5ML; UG/.5ML
0.5 SUSPENSION INTRAMUSCULAR ONCE
Refills: 0 | Status: COMPLETED | OUTPATIENT
Start: 2019-11-06 | End: 2019-11-06

## 2019-11-06 RX ORDER — IBUPROFEN 200 MG
600 TABLET ORAL EVERY 6 HOURS
Refills: 0 | Status: DISCONTINUED | OUTPATIENT
Start: 2019-11-06 | End: 2019-11-07

## 2019-11-06 RX ORDER — IBUPROFEN 200 MG
1 TABLET ORAL
Qty: 28 | Refills: 0
Start: 2019-11-06 | End: 2019-11-12

## 2019-11-06 RX ADMIN — Medication 600 MILLIGRAM(S): at 14:40

## 2019-11-06 RX ADMIN — Medication 975 MILLIGRAM(S): at 23:10

## 2019-11-06 RX ADMIN — Medication 200 MILLIGRAM(S): at 22:21

## 2019-11-06 RX ADMIN — Medication 975 MILLIGRAM(S): at 16:24

## 2019-11-06 RX ADMIN — Medication 975 MILLIGRAM(S): at 22:19

## 2019-11-06 RX ADMIN — Medication 975 MILLIGRAM(S): at 16:54

## 2019-11-06 RX ADMIN — Medication 975 MILLIGRAM(S): at 10:16

## 2019-11-06 RX ADMIN — Medication 975 MILLIGRAM(S): at 11:02

## 2019-11-06 RX ADMIN — Medication 600 MILLIGRAM(S): at 13:49

## 2019-11-06 RX ADMIN — Medication 1 TABLET(S): at 13:49

## 2019-11-06 NOTE — DISCHARGE NOTE OB - CARE PROVIDER_API CALL
Amber Mckenzie)  Obstetrics and Gynecology  [address] [place] [address]  Phone: [phone]  Fax: [phone]  Follow Up Time:

## 2019-11-06 NOTE — PROGRESS NOTE ADULT - SUBJECTIVE AND OBJECTIVE BOX
POD # 2    Patient resting comfortably in NAD  Afebrile VSS  Abdomen  Soft Not tender  Incision C / D / I   Extrem  No Homans  Lochia Nl    WBC 8.2  H/H 8.7/28.7  Platel. 145    Patient s/p R c/s. Tubal excision  Voiding ++  Flatus ++  BM ++    Continue post op care

## 2019-11-06 NOTE — DISCHARGE NOTE OB - ADDITIONAL INSTRUCTIONS
Please plan on making appointment for a postpartum examination with Dr. Mckenzie in 2 weeks from discharge.

## 2019-11-06 NOTE — PROGRESS NOTE ADULT - PROBLEM SELECTOR PLAN 1
-Pain well controlled  -Ambulating and voiding without difficulty  -Incision site clean, intact, with steri-strips in place  -Baby in NICU  -On Metoprolol 100 mg BID    Dispo: Patient is doing well post-op; plan for d/c on PPD3

## 2019-11-06 NOTE — DISCHARGE NOTE OB - HOSPITAL COURSE
Patient had scheduled repeat  delivery with sterilization via bilateral salpingectomy. Hospital course was uncomplicated. Her pain was well controlled. She is tolerating a regular diet. She is ambulating independently. She was voiding without assistance. Patient with flatus. Labs and Vitals WNL at time of discharge.

## 2019-11-06 NOTE — DISCHARGE NOTE OB - PATIENT PORTAL LINK FT
You can access the FollowMyHealth Patient Portal offered by [facility] by registering at the following website: [email or web address]. By joining [facility]’s FollowMyHealth portal, you will also be able to view your health information using other applications (apps) compatible with our system.

## 2019-11-06 NOTE — PROGRESS NOTE ADULT - ASSESSMENT
GABRIELA LESLIE is a 38 yo  now POD#1 s/p repeat  section and BTL @ 37wk3d due to due to HOCM (EF 60-65%), C/S under general, tap block. Baby in NICU
A: Patient is a 39 year old  ppd2 s/p rCS + BTL on 2019. Post-partum course to date has been uncomplicated.

## 2019-11-06 NOTE — DISCHARGE NOTE OB - MEDICATION SUMMARY - MEDICATIONS TO TAKE
I will START or STAY ON the medications listed below when I get home from the hospital:    prenatal vitamins  -- once a day  -- Indication: For Maternal wellness    acetaminophen 325 mg oral tablet  -- 3 tab(s) by mouth every 6 hours, As Needed -for moderate pain   -- Indication: For pain control    ibuprofen 600 mg oral tablet  -- 1 tab(s) by mouth every 6 hours, As Needed -for mild pain   -- Indication: For pain control    simvastatin 20 mg oral tablet  -- 1 tab(s) by mouth once a day (at bedtime)  -- Indication: For Home medications    metoprolol succinate 100 mg oral tablet, extended release  -- 1 tab(s) by mouth 2 times a day  -- Indication: For Home medication

## 2019-11-06 NOTE — DISCHARGE NOTE OB - PLAN OF CARE
delivery and recovery 1) Please take ibuprofen as needed for pain as prescribed.  2) Nothing in the vagina for 6 weeks (including no sex, no tampons, and no douching).  3) Please call your doctor for a follow up your postpartum appointment in 2 weeks.  4) Please continue taking vitamins postpartum.   5) Please call the office sooner if you have heavy vaginal bleeding, severe abdominal pain, or fever > 100.4F.

## 2019-11-06 NOTE — PROGRESS NOTE ADULT - SUBJECTIVE AND OBJECTIVE BOX
Post-Partum Progress Note  S:   Patient is a 39 year old  ppd2 s/p rCS + BTL on 2019. C/S was complicated by general anesthesia due to HCM and ICD in place. Post-Partum course has been uncomplicated to date. Patient is ambulating and voiding without difficulty. Patient is passing flatus and has had a bowel movement. Patient has minimal lochia. Pain is well controlled on minimal PO pain medication. Patient denies any nausea, vomiting, diarrhea, headache, or dizziness. Baby is in NICU.     PAST MEDICAL & SURGICAL HISTORY:  Pre-eclampsia, antepartum:   Hypertrophic cardiomyopathy: Pacemaker -  Palpitations  Hyperlipidemia  Preeclampsia  Seizure disorder  Cardiac pacemaker: -17  H/O:     O: Vital Signs Last 24 Hrs  T(C): 36.4 (2019 19:50), Max: 36.8 (2019 08:17)  T(F): 97.5 (2019 19:50), Max: 98.3 (2019 08:17)  HR: 60 (2019 19:50) (60 - 96)  BP: 113/71 (2019 22:30) (93/63 - 113/71)  RR: 18 (2019 22:30) (11 - 18)  SpO2: 99% (2019 19:50) (96% - 99%)  Gen: NAD  Abd: soft, NT, ND, fundus firm below umbilicus. Incision appears clean, intact, with steri-strips in place.   Lochia: moderate  Ext: no tenderness    Labs:                        7.7    11.87 )-----------( 147      ( 2019 06:29 )             25.7       A: Patient is a 39 year old  ppd2 s/p rCS + BTL on 2019. Post-partum course to date has been uncomplicated.     Plan:  Post-Partum:  -Pain well controlled  -Ambulating and voiding without difficulty  -Incision site clean, intact, with steri-strips in place  -Baby in NICU  -On Metoprolol 11mg BID    Dispo: Patient is doing well post-op; plan for d/c on CT3    Dolores Braun-MS4 Post-Partum Progress Note  S:   Patient is a 39 year old  ppd2 s/p rCS + BTL on 2019. C/S was complicated by general anesthesia due to HCM and ICD in place. Post-Partum course has been uncomplicated to date. Patient is ambulating and voiding without difficulty. Patient is passing flatus and has had a bowel movement. Patient has minimal lochia. Pain is well controlled on minimal PO pain medication. Patient denies any nausea, vomiting, diarrhea, headache, or dizziness. Baby is in NICU.     PAST MEDICAL & SURGICAL HISTORY:  Pre-eclampsia, antepartum:   Hypertrophic cardiomyopathy: Pacemaker -  Palpitations  Hyperlipidemia  Preeclampsia  Seizure disorder  Cardiac pacemaker: -  H/O:     O: Vital Signs Last 24 Hrs  T(C): 36.4 (2019 19:50), Max: 36.8 (2019 08:17)  T(F): 97.5 (2019 19:50), Max: 98.3 (2019 08:17)  HR: 60 (2019 19:50) (60 - 96)  BP: 113/71 (2019 22:30) (93/63 - 113/71)  RR: 18 (2019 22:30) (11 - 18)  SpO2: 99% (2019 19:50) (96% - 99%)  Gen: NAD  Abd: soft, NT, ND, fundus firm below umbilicus. Incision appears clean, intact, with steri-strips in place.   Lochia: moderate  Ext: no tenderness    Labs:                        7.7    11.87 )-----------( 147      ( 2019 06:29 )             25.7

## 2019-11-06 NOTE — DISCHARGE NOTE OB - CARE PLAN
Principal Discharge DX:	 delivery delivered  Goal:	delivery and recovery  Assessment and plan of treatment:	1) Please take ibuprofen as needed for pain as prescribed.  2) Nothing in the vagina for 6 weeks (including no sex, no tampons, and no douching).  3) Please call your doctor for a follow up your postpartum appointment in 2 weeks.  4) Please continue taking vitamins postpartum.   5) Please call the office sooner if you have heavy vaginal bleeding, severe abdominal pain, or fever > 100.4F.

## 2019-11-07 ENCOUNTER — APPOINTMENT (OUTPATIENT)
Dept: ANTEPARTUM | Facility: CLINIC | Age: 39
End: 2019-11-07

## 2019-11-07 VITALS
DIASTOLIC BLOOD PRESSURE: 66 MMHG | HEART RATE: 76 BPM | RESPIRATION RATE: 18 BRPM | TEMPERATURE: 98 F | SYSTOLIC BLOOD PRESSURE: 104 MMHG

## 2019-11-07 PROCEDURE — 85027 COMPLETE CBC AUTOMATED: CPT

## 2019-11-07 PROCEDURE — 59025 FETAL NON-STRESS TEST: CPT

## 2019-11-07 PROCEDURE — 86850 RBC ANTIBODY SCREEN: CPT

## 2019-11-07 PROCEDURE — 86780 TREPONEMA PALLIDUM: CPT

## 2019-11-07 PROCEDURE — 36415 COLL VENOUS BLD VENIPUNCTURE: CPT

## 2019-11-07 PROCEDURE — 88302 TISSUE EXAM BY PATHOLOGIST: CPT

## 2019-11-07 PROCEDURE — 88307 TISSUE EXAM BY PATHOLOGIST: CPT

## 2019-11-07 PROCEDURE — 86900 BLOOD TYPING SEROLOGIC ABO: CPT

## 2019-11-07 PROCEDURE — 59050 FETAL MONITOR W/REPORT: CPT

## 2019-11-07 PROCEDURE — 86901 BLOOD TYPING SEROLOGIC RH(D): CPT

## 2019-11-07 RX ADMIN — Medication 975 MILLIGRAM(S): at 08:48

## 2019-11-07 RX ADMIN — Medication 975 MILLIGRAM(S): at 09:40

## 2019-11-07 NOTE — MEDICAL STUDENT PROGRESS NOTE(EDUCATION) - SUBJECTIVE AND OBJECTIVE BOX
GABRIELA LESLIE is a 39y  s/p repeat CS and BTL at 37 and 3/7 weeks due to HOCM now POD3 of a viable female infant. Baby is doing well in NICU    SUBJECTIVE:  No acute events overnight, patient has no complaints.  Pain is well controlled with PRN pain medication.  She is ambulating, voiding, tolerating PO. Denies N/V  + flatus, + BM.  Normal lochia.    OBJECTIVE:  Physical exam:  General: AOx3, NAD.  Abdomen: Soft, appropriately tender to palpitation, firm uterine fundus at umbilicus. Incision is clean dry and intact.  Ext: No DVT signs, warm extremities.    Vital Signs Last 24 Hrs  T(C): 36.7 (2019 21:43), Max: 36.7 (2019 08:19)  T(F): 98 (2019 21:43), Max: 98 (2019 08:19)  HR: 73 (2019 22:11) (62 - 76)  BP: 122/77 (2019 22:11) (107/71 - 122/77)  RR: 18 (2019 22:11) (18 - 18)  SpO2: 98% (2019 21:43) (98% - 98%)    LABS:                        8.7    8.23  )-----------( 145      ( 2019 06:26 )             28.7

## 2019-11-07 NOTE — PROGRESS NOTE ADULT - SUBJECTIVE AND OBJECTIVE BOX
POD # 3    Patient seen in NICU  Patient states she is doing well  Afebrile VSS  Incision C / D / I  Extrem  No Homans    Patient s/p R c/s Tubal excision  Flatus +++  BM  +++    DC to home   F/U office 2 weeks

## 2019-11-07 NOTE — MEDICAL STUDENT PROGRESS NOTE(EDUCATION) - NS MD HP STUD ASPLAN PLAN FT
- Stable  - Hgb 10.7 --> 7.7 --> 8.7  - Pain: well controlled on PO pain meds  - GI: regular diet  - : voiding  - DVT ppx: Ambulation  - Continue routine postop care  - Dispo: POD3, unless otherwise specified - Stable  - Hgb 10.7 --> 7.7 --> 8.7  - Pain: well controlled on PO pain meds  - GI: regular diet  - : voiding  - DVT ppx: Ambulation  - Continue routine postop care  - Plan for discharge today pending attending assessment

## 2019-11-08 ENCOUNTER — APPOINTMENT (OUTPATIENT)
Dept: GASTROENTEROLOGY | Facility: CLINIC | Age: 39
End: 2019-11-08

## 2019-11-08 LAB — SURGICAL PATHOLOGY STUDY: SIGNIFICANT CHANGE UP

## 2019-11-11 ENCOUNTER — APPOINTMENT (OUTPATIENT)
Dept: ANTEPARTUM | Facility: CLINIC | Age: 39
End: 2019-11-11

## 2019-11-14 ENCOUNTER — APPOINTMENT (OUTPATIENT)
Dept: ANTEPARTUM | Facility: CLINIC | Age: 39
End: 2019-11-14

## 2019-11-18 ENCOUNTER — APPOINTMENT (OUTPATIENT)
Dept: ANTEPARTUM | Facility: CLINIC | Age: 39
End: 2019-11-18

## 2019-11-21 ENCOUNTER — APPOINTMENT (OUTPATIENT)
Dept: ANTEPARTUM | Facility: CLINIC | Age: 39
End: 2019-11-21

## 2020-01-17 NOTE — CONSULT NOTE ADULT - CONSULT REASON
HPI     KWAKU: 5+ years   Pt has chronic dry eye every since her lasik. Also states a lot of itchy   eyes  Pt cant see very well with reading and states dist is okay. Wears   OTC reader +2.00.   Occ. Floaters, no flashes  SOOTHE XP QID/sleep mask and gel at night  lasik sx 2001    Last edited by Carroll Tracy, OD on 1/17/2020  1:53 PM. (History)        ROS     Positive for: Eyes (LASIK)    Negative for: Constitutional, Gastrointestinal, Neurological, Skin,   Genitourinary, Musculoskeletal, HENT, Endocrine, Cardiovascular,   Respiratory, Psychiatric, Allergic/Imm, Heme/Lymph    Last edited by Carroll Tracy, OD on 1/17/2020  1:53 PM. (History)        Assessment /Plan     For exam results, see Encounter Report.    Presbyopia    Dry eyes, bilateral  -     lifitegrast (XIIDRA) 5 % Dpet; Place 1 drop into both eyes 2 (two) times daily.  Dispense: 60 each; Refill: 11    Hx of LASIK      1. Sp LASIK OU--pt wearing readers, but now has distance issues.  Wrote bifocal Rx--discussed PALs/adaptation.  Also discussed CRIZAL for glare  2. Dry eye sx with limited relief on SOOTHE XP QID/sleep mask+gel at night.  Tried RESTASIS in past without relief.  Discussed options--pt wishes to try XIIDRA  3. Transplant pt    PLAN:    1. Start XIIDRA BID OU  2. Cont w SOOTHE XP/sleep mask  3. Pt will call if problems, otherwise rtc 1 yr                 
Evaluation post delivery

## 2020-01-29 ENCOUNTER — APPOINTMENT (OUTPATIENT)
Dept: GASTROENTEROLOGY | Facility: CLINIC | Age: 40
End: 2020-01-29
Payer: MEDICAID

## 2020-01-29 VITALS
HEART RATE: 76 BPM | DIASTOLIC BLOOD PRESSURE: 74 MMHG | BODY MASS INDEX: 33.26 KG/M2 | HEIGHT: 59 IN | SYSTOLIC BLOOD PRESSURE: 118 MMHG | RESPIRATION RATE: 16 BRPM | OXYGEN SATURATION: 98 % | WEIGHT: 165 LBS

## 2020-01-29 PROCEDURE — 99214 OFFICE O/P EST MOD 30 MIN: CPT

## 2020-01-29 NOTE — HISTORY OF PRESENT ILLNESS
[de-identified] : The patient has a history of having outlet tract obstruction, SVT- has AICD. \par     Patient is postpartum 2 months. Bowel pregnant the patient was seen for constipation, hemorrhoids, GERD. \par      Patient has had constipation for 20 years. Constipation was much worse during pregnancy. She would have a bowel movement every 5 days. She  using MiraLax on a bowel movement which would then give diarrhea. She said just after giving birth her constipation resolved and she provided no medication. However her constipation is now bleeding again and she is having a bowel movement M.D. 2 days and pills harder than normal. Pregnancy showed duodenitis external hemorrhoids. There was a lot of suspected and ProctoFoam cream. She has not had any recent the hemorrhoids. She has no rectal bleeding or pain.\par      The patient has GERD over 10 years. Specific of heartburn and regurgitation. Was at night. There was severe during pregnancy. She was taking Zantac with no relief. Again patient states that her GERD resolved after birth but now is beginning again. The patient has heartburn and times a week. It is mild. It lasted about 20 minutes and is worse after eating. Resolves with TUMS.

## 2020-01-29 NOTE — ASSESSMENT
[FreeTextEntry1] : A/P\par F/U in 3 months\par constipation\par Reviewed high fiber diet. Start  Benefiber qd\par \par hemorrhoids- proctofoam HC prn\par \par GERD\par Today's instructions for acid reflux include avoid provocative foods. For example citrus alcohol coffee chocolate mints. Smaller meals, no eating 3 hours prior to bedtime and elevate head of the bed prior to sleep.\par - protonix 40 mg prn\par \par - give cardiac hx . Will only do EGD if symptoms become severe\par \par

## 2020-01-29 NOTE — PHYSICAL EXAM
[General Appearance - Alert] : alert [General Appearance - Well Nourished] : well nourished [General Appearance - Well Developed] : well developed [General Appearance - In No Acute Distress] : in no acute distress [Outer Ear] : the ears and nose were normal in appearance [Sclera] : the sclera and conjunctiva were normal [Neck Appearance] : the appearance of the neck was normal [Exaggerated Use Of Accessory Muscles For Inspiration] : no accessory muscle use [Respiration, Rhythm And Depth] : normal respiratory rhythm and effort [Auscultation Breath Sounds / Voice Sounds] : lungs were clear to auscultation bilaterally [Heart Rate And Rhythm] : heart rate was normal and rhythm regular [Apical Impulse] : the apical impulse was normal [Abdomen Soft] : soft [Bowel Sounds] : normal bowel sounds [Heart Sounds] : normal S1 and S2 [Abdomen Tenderness] : non-tender [Femoral Lymph Nodes Enlarged Bilaterally] : femoral [Skin Color & Pigmentation] : normal skin color and pigmentation [Skin Turgor] : normal skin turgor [Oriented To Time, Place, And Person] : oriented to person, place, and time [] : no rash [Affect] : the affect was normal [Impaired Insight] : insight and judgment were intact

## 2020-04-30 ENCOUNTER — APPOINTMENT (OUTPATIENT)
Dept: GASTROENTEROLOGY | Facility: CLINIC | Age: 40
End: 2020-04-30
Payer: MEDICAID

## 2020-04-30 PROCEDURE — 99214 OFFICE O/P EST MOD 30 MIN: CPT | Mod: 95

## 2020-04-30 NOTE — PHYSICAL EXAM
[General Appearance - In No Acute Distress] : in no acute distress [General Appearance - Alert] : alert [Neck Appearance] : the appearance of the neck was normal [General Appearance - Well Developed] : well developed [General Appearance - Well Nourished] : well nourished [] : no respiratory distress [Oriented To Time, Place, And Person] : oriented to person, place, and time [Impaired Insight] : insight and judgment were intact [Affect] : the affect was normal

## 2020-04-30 NOTE — ASSESSMENT
[FreeTextEntry1] : A/P\par gerd\par Today's instructions for acid reflux include avoid provocative foods. For example citrus alcohol coffee chocolate mints. Smaller meals, no eating 3 hours prior to bedtime and elevate head of the bed prior to sleep.\par protonix 40 mg prn. Symptoms controlled . Pt with CM , has AICD. Will hold off on EGD unless symptoms worsen.\par \par Constipation\par high fiber diet\par Benefiber qd. Will try 1/2m capful of miralax qd- qod. ( had diarrhea with miralax qd)\par F/U in office in 6 months\par hemorrhoids- asymptomatic now . may use  proctosol cream prn

## 2020-04-30 NOTE — HISTORY OF PRESENT ILLNESS
[Medical Office: ([facility])___] : at the medical office located in  [Home] : at home, [unfilled] , at the time of the visit. [Self] : self [Patient] : the patient [de-identified] : The patient is having a telemetry hasn't visit. Visit was 25 minutes.\par    The patient has a history of GERD 5 years. Specifically she gets heartburn. It occurs 4-5 times a week. It is worse at night when lying down. The last one to 2 hours. It is better with Protonix 40 mg p.r.n. She uses it about 4 times a week. She has no dysphagia.\par    Patient spell that her stools are light up but are not limited to agree. She describes them as a lighter tan color. No black stools no bloody stools no abdominal pain. Son with biliary atresia.\par    The patient has a history of constipation for 20 years. Symptoms are moderate to severe. Symptoms occur every day. Worse with low fiber diet. Symptoms are better with Benefiber and high-fiber diet. However she still says that she can go 3-4 days without a bowel movement some times.\par    Patient has a history of internal hemorrhoids. It was worse during her recent pregnancy. His symptoms were moderate in severity. She had pain but no rectal bleeding. Symptoms resolved with ProctoFoam. She is asymptomatic now\par

## 2020-06-29 NOTE — REVIEW OF SYSTEMS
[Dyspnea on exertion] : dyspnea during exertion [Negative] : Respiratory [Feeling Fatigued] : not feeling fatigued [Shortness Of Breath] : no shortness of breath [Lower Ext Edema] : no extremity edema [Chest Pain] : no chest pain [Palpitations] : no palpitations

## 2020-06-29 NOTE — PHYSICAL EXAM
[General Appearance - Well Developed] : well developed [General Appearance - Well Nourished] : well nourished [General Appearance - In No Acute Distress] : no acute distress [Normal Conjunctiva] : the conjunctiva exhibited no abnormalities [Normal Oral Mucosa] : normal oral mucosa [Normal Oropharynx] : normal oropharynx [Normal Jugular Venous V Waves Present] : normal jugular venous V waves present [Respiration, Rhythm And Depth] : normal respiratory rhythm and effort [Heart Rate And Rhythm] : heart rate and rhythm were normal [Edema] : no peripheral edema present [Bowel Sounds] : normal bowel sounds [Abnormal Walk] : normal gait [Abdomen Soft] : soft [Nail Clubbing] : no clubbing of the fingernails [Cyanosis, Localized] : no localized cyanosis [Skin Color & Pigmentation] : normal skin color and pigmentation [Skin Turgor] : normal skin turgor [] : no rash [Oriented To Time, Place, And Person] : oriented to person, place, and time [Impaired Insight] : insight and judgment were intact [No Anxiety] : not feeling anxious [FreeTextEntry1] : Systolic murmur 2/6, increased with valsalva

## 2020-06-29 NOTE — DISCUSSION/SUMMARY
[FreeTextEntry1] : 39 year old woman with history of obesity, HLD, hypertrophic cardiomyopathy (HCM) with outflow tract obstruction, NSVT and family history of sudden death s/p primary prevention S-ICD on 18, with subsequent appropriate ICD shock for ventricular fibrillation in 2019. She is now pregnant with planned elective . \par -would continue beta blocker as tolerated. Although transition to labetalol was attempted due to better safety profile in pregnancy, she could not tolerate this. \par -no further EP intervention indicated prior to planned . Would deactivate ICD (can be with magnet) during elective surgery, and ensure reactivated following surgery. Please call EP service as needed perioperatively. \par \par

## 2020-06-29 NOTE — HISTORY OF PRESENT ILLNESS
[FreeTextEntry1] : 39 year old woman with history of obesity, HLD, hypertrophic cardiomyopathy (HCM) with outflow tract obstruction, NSVT and family history of sudden death s/p primary prevention S-ICD on 18. \par In 2019 she had an episode of ventricular tachycardia and had appropriate defibrillation from her S-ICD. This occurred during sleep and she was unaware, but it was identified during subsequent device followup. \par She is now pregnant (31 weeks gestation), and is planned to have an elective  on 11/15/19. She has been tolerating pregnancy generally well. Current medications include Toprol 100mg qd. Transition to labetalol was attempted but she could not tolerate it. \par She denies palpitation or syncope. \par Interrogation of the S-ICD today reveals no new arrhythmias recorded since 2019 when she had appropriate ICD shock for ventricular fibrillation. Battery longevity is 84%. Her incision sites are healing well. \par \par

## 2020-06-29 NOTE — REASON FOR VISIT
[Cardiomyopathy] : cardiomyopathy [Follow-Up - Clinic] : a clinic follow-up of [FreeTextEntry1] : ref: Dr. Armas

## 2020-06-30 RX ORDER — AMPICILLIN 500 MG/1
500 CAPSULE ORAL
Refills: 0 | Status: DISCONTINUED | COMMUNITY
End: 2020-06-30

## 2020-07-01 ENCOUNTER — APPOINTMENT (OUTPATIENT)
Dept: ELECTROPHYSIOLOGY | Facility: CLINIC | Age: 40
End: 2020-07-01
Payer: MEDICAID

## 2020-07-01 VITALS
SYSTOLIC BLOOD PRESSURE: 114 MMHG | DIASTOLIC BLOOD PRESSURE: 72 MMHG | HEIGHT: 59 IN | HEART RATE: 57 BPM | BODY MASS INDEX: 34.47 KG/M2 | WEIGHT: 171 LBS

## 2020-07-01 DIAGNOSIS — I49.3 VENTRICULAR PREMATURE DEPOLARIZATION: ICD-10-CM

## 2020-07-01 PROCEDURE — 99215 OFFICE O/P EST HI 40 MIN: CPT

## 2020-09-26 NOTE — REVIEW OF SYSTEMS
[Dyspnea on exertion] : dyspnea during exertion [Palpitations] : palpitations [Abn Vaginal Bleeding] : unexplained vaginal bleeding [Anxiety] : anxiety [Negative] : Musculoskeletal [Feeling Fatigued] : not feeling fatigued [Shortness Of Breath] : no shortness of breath [Chest Pain] : no chest pain [Lower Ext Edema] : no extremity edema [Skin: A Rash] : no rash: [Skin Lesions] : no skin lesions [Dizziness] : no dizziness [Convulsions] : no convulsions [Confusion] : no confusion was observed [Easy Bleeding] : no tendency for easy bleeding [Easy Bruising] : no tendency for easy bruising

## 2020-09-26 NOTE — DISCUSSION/SUMMARY
[FreeTextEntry1] : 39 year old woman with history of obesity, HLD, hypertrophic cardiomyopathy (HCM) with outflow tract obstruction, NSVT and family history of sudden death s/p S-ICD on 4/20/18, now with recurrent VT/VF. She has had two ICD shocks for polymorphic VT/VF. The recent episode does appear to have been triggered by a short-coupled PVC, and she has noted symptoms of palpitation cosnsitent with ectopy (PVCs) clustered around times that she has had ICD shocks during sleep. I suspect that she may therefore have a PVC trigger inducing her VF episodes, on top of her vulnerable substrate with HCM. We did discuss that PVCs are generally benign, but treatment is indicated if they are found to be triggering VF events or causing bothersome symptoms (which she believes they are). Management options for PVCs were reviewed including medical therapy or PVC ablation. Options for antiarrhythmic therapy are limited as she is already bradycardic with Toprol and given concern for QT prolongation (and desire to avoid amiodarone given her young age). PVC ablation was also discussed in detail, though success may be limited if frequent PVCs are not noted during the procedure (as her symptomatic episodes are erratic). The risks and benefits of PVC ablation were discussed, including procedure related risks including bleeding, vascular injury, cardiac perforation, stroke, and MI. She expressed understanding, and is agreeable to proceed. She is concerned that she will require anesthesia for anxiety during the procedure, and we discussed that light sedation may be needed to avoid PVC suppression during the procedure. \par -24 hour Holter monitor to evaluate PVC burden\par -EP study and PVC ablation. Hold metoprolol for 2 days prior to procedure\par -pt to follow-up with Dr. Armas regarding potential septal ablation for symptomatic outflow tract obstruction as well. \par

## 2020-09-26 NOTE — HISTORY OF PRESENT ILLNESS
[FreeTextEntry1] : 39 year old woman with history of obesity, HLD, hypertrophic cardiomyopathy (HCM) with outflow tract obstruction, NSVT and family history of sudden death s/p S-ICD on 18, now with recurrent VT/VF. \par In 2019 she had an episode of ventricular tachycardia and had appropriate defibrillation from her S-ICD. This occurred during sleep and she was unaware, but it was identified during subsequent device followup. \par She recently had a baby and delivered via elective  on 11/15/19. She tolerated pregnancy and delivery generally well, but did have subsequent vaginal and leg bleeding. She had postpartum depression in 2020 and briefly took Lexapro. \par On 20, she had another episode of polymorphic VT/VF resulting in a successful ICD shock. The episode did appear to initiate with a PVC which occurred on top of a Twave. She was sleeping when the shock occurred. Interrogation of the S-ICD today otherwise reveals no other recorded arrhythmias and Battery longevity is 75%.\par For the few days around the time of the shock she did not feelings of palpitation c/w “a pause” in the chest and subsequent brief dizziness and nausea. These have occurred in spurts with episodes multiple times per day. She has not had sustained palpitation or syncope. \par  Current medications include Toprol 100mg qd. \par She has seen Dr. Armas and her outflow gradient has increased- she is now being considered for septal ablation. \par \par

## 2020-09-26 NOTE — REASON FOR VISIT
[Follow-Up - Clinic] : a clinic follow-up of [Cardiomyopathy] : cardiomyopathy [Ventricular Tachycardia] : ventricular tachycardia [FreeTextEntry1] : ref: Dr. Armas

## 2020-09-26 NOTE — PHYSICAL EXAM
[General Appearance - Well Developed] : well developed [General Appearance - Well Nourished] : well nourished [General Appearance - In No Acute Distress] : no acute distress [Normal Conjunctiva] : the conjunctiva exhibited no abnormalities [Normal Oral Mucosa] : normal oral mucosa [Normal Oropharynx] : normal oropharynx [Respiration, Rhythm And Depth] : normal respiratory rhythm and effort [Normal Jugular Venous V Waves Present] : normal jugular venous V waves present [Heart Rate And Rhythm] : heart rate and rhythm were normal [Edema] : no peripheral edema present [Abdomen Soft] : soft [Bowel Sounds] : normal bowel sounds [Abnormal Walk] : normal gait [Nail Clubbing] : no clubbing of the fingernails [Cyanosis, Localized] : no localized cyanosis [Skin Turgor] : normal skin turgor [] : no rash [Skin Color & Pigmentation] : normal skin color and pigmentation [Impaired Insight] : insight and judgment were intact [Oriented To Time, Place, And Person] : oriented to person, place, and time [FreeTextEntry1] : incision sites (left lateral chest wall and subxiphoid) well healed, no erythema, bleeding or swelling.

## 2020-09-30 ENCOUNTER — APPOINTMENT (OUTPATIENT)
Dept: ELECTROPHYSIOLOGY | Facility: CLINIC | Age: 40
End: 2020-09-30
Payer: MEDICAID

## 2020-09-30 VITALS
HEART RATE: 50 BPM | SYSTOLIC BLOOD PRESSURE: 112 MMHG | WEIGHT: 170 LBS | BODY MASS INDEX: 34.27 KG/M2 | HEIGHT: 59 IN | DIASTOLIC BLOOD PRESSURE: 20 MMHG

## 2020-09-30 PROCEDURE — 99215 OFFICE O/P EST HI 40 MIN: CPT

## 2020-09-30 PROCEDURE — 93000 ELECTROCARDIOGRAM COMPLETE: CPT

## 2021-01-06 DIAGNOSIS — K64.4 RESIDUAL HEMORRHOIDAL SKIN TAGS: ICD-10-CM

## 2022-01-10 NOTE — OB RN DELIVERY SUMMARY - [facility]SS[facility]_OBGYN_ALL_OB_END_DATE
Patient Education       Coronavirus Disease 2019 (COVID-19): Caring for Yourself or Others  If you or a household member have symptoms of COVID-19, follow these guidelines for preventing spread of the virus and managing symptoms. This is regardless of your vaccination status.  If you have COVID-19 symptoms  · Stay home. Call your healthcare provider and tell them you have symptoms.. Do this before going to any hospital or clinic. Follow your provider's instructions. You may be advised to isolate yourself at home. This is called self-isolation. You may also be told to stay at least 6 feet from others to prevent the spread of COVID-19. This is called social distancing.  · Stay away from work, school, and public places. Limit physical contact with family members. Limit visitors. Don't kiss anyone or share eating or drinking utensils. Clean surfaces you touch with disinfectant. This is to help prevent the virus from spreading.  · If you need to cough or sneeze, do it into a tissue. Then throw the tissue into the trash. If you don't have tissues, cough or sneeze into the bend of your elbow.  · Wear a cloth face mask with 2 or more layers of washable, breathable fabric and a nose wire while in public or when indoors with people who don't live with you. Or you can wear a disposable paper mask with a cloth mask on top. Wear the mask so that it covers both your nose and mouth.  · Don’t share food or personal items with people in your household. This includes items like eating and drinking utensils, towels, and bedding.  · If you need to go to a hospital or clinic, expect that the healthcare staff will wear protective equipment such as masks, gowns, gloves, and eye protection. You may be advised to wait in or enter through a separate area. This is to prevent the possible virus from spreading.  · Tell the healthcare staff about recent travel. This includes local travel on public transport. Staff may need to find other people  you have been in contact with.  · Follow all instructions the healthcare staff give you.    If you have been diagnosed with COVID-19  · Stay home and start self-isolation. Don’t leave your home unless you need to get medical care. Don't go to work, school, or public areas. Don't use public transportation or taxis.  · Follow all instructions from your healthcare provider. Call your healthcare provider’s office before going. They can prepare and give you instructions. This will help prevent the virus from spreading.  · If you need to go to a hospital or clinic, expect that the healthcare staff will wear protective equipment such as masks, gowns, gloves, and eye protection. You may be advised to wait in or enter through a separate area. This is to prevent the possible virus from spreading.  · Wear a face mask with 2 or more layers and a nose wire. Use either a cloth mask with layers of tightly woven, breathable fabric or a disposable paper mask with a cloth mask on top. This is to protect other people from your germs. If you are not able to wear a mask, your caregivers should. Wear the mask so that it covers both your nose and mouth.  · Stay away from other people in your home.  · Stay away from pets and other animals.  · Don’t share food or personal items with people in your household. This includes items like eating and drinking utensils, towels, and bedding.  · If you need to cough or sneeze, do it into a tissue. Then throw the tissue into the trash. If you don't have tissues, cough or sneeze into the bend of your elbow.  · Wash your hands often.    Self-care at home   Prevention  Several vaccines are available to prevent COVID-19 or reduce its severity. These vaccine reduce how severe the illness will be if you get the virus. No vaccine is ever 100% effective in preventing any illness, but the COVID-19 vaccines work well and are safe. One vaccine is available for people as young as 5. Expert groups, including ACOG  and CDC, advise pregnant or breastfeeding people to be vaccinated. Talk with your healthcare provider about which vaccine is best for you and your family.  Treatment  Current treatment is mainly aimed at helping your body while it fights the virus. This is known as supportive care. For serious COVID-19, you may need to stay in the hospital. Supportive care includes:  · Getting rest. This helps your body fight the illness.  · Staying hydrated.  Drinking liquids is the best way to prevent dehydration. Try to drink 6 to 8 glasses of liquids every day, or as advised by your provider. Also check with your provider about which fluids are best for you. Don't drink fluids that contain caffeine or alcohol.  · Taking over-the-counter (OTC) pain medicine. These are used to help ease pain and reduce fever. Follow your healthcare provider's instructions for which OTC medicine to use.  If you've been treated for suspected or confirmed COVID-19 , follow all of your healthcare team's instructions. This will include when it's OK to stop self-isolation. You may also get instructions on position changes to help your breathing, such as lying on your belly (prone positioning). If you were treated at a hospital and discharged, you may be sent home with a pulse oximeter. This is a small electronic device that you clip on your fingertip. It measures the amount of oxygen in your body. Follow your healthcare team's instructions on its use, how they will be in touch with you, and let you know when to call them.  The FDA approved monoclonal antibody therapy for emergency investigational use in certain people who have a positive COVID-19 viral test and have mild to moderate symptoms but are not in the hospital. Your healthcare provider will advise you on whether monoclonal antibody therapy is appropriate for you. It's not approved to prevent COVID-19. It's approved for people 12 years and older who weigh at least 88 pounds (about 40 kgs) and  are at high risk for severe COVID-19 and a hospital stay. This includes people who are 65 years and older and people with certain chronic conditions. Monoclonal antibody therapy is not approved for people who:  · Are in the hospital with COVID-19, or  · Need oxygen therapy for COVID-19, or  · Need oxygen therapy for a chronic condition and need to have oxygen flow increased because of COVID-19     If you've had confirmed COVID-19, your healthcare team may ask you to consider donating your plasma. This is called COVID-19 convalescent plasma donation. Plasma from people fully recovered from COVID-19 may contain antibodies to help fight COVID-19 in people who are currently seriously ill with the disease. Experts don't know the safety of COVID-19 convalescent plasma or how well it works. Research continues. The FDA has approved it for emergency use in certain people with serious or life-threatening COVID-19.  Home care for a sick person   · Follow all instructions from healthcare staff.  · Wash your hands often.  · Wear protective clothing as advised.  · Make sure the sick person wears a mask. If they can't wear a mask, don't stay in the same room with the person. If you must be in the same room, wear a face mask. When wearing a mask, make sure that it covers both the nose and mouth.  · Keep track of the sick person’s symptoms.  · Clean home surfaces often with disinfectant. This includes phones, kitchen counters, fridge door handle, bathroom surfaces, and others.  · Don’t let anyone share household items with the sick person. This includes eating and drinking tools, towels, sheets, or blankets.  · Clean fabrics and laundry thoroughly.  · Keep other people and pets away from the sick person.    When you can stop self-isolation  When you are sick with COVID-19, you should stay away from other people. This is called self-isolation.  For normally healthy children or adults with COVID-19 symptoms, CDC advises stopping  self-isolation when all 3 of these are true:  1. You have had no fever for at least 24 hours. This means no fever without medicine that reduces fever, such as acetaminophen, for at least 24 hours.  2. Your symptoms such as cough or trouble breathing have improved.  3. It has been at least 10 days since your first symptoms started.  For people who have COVID-19 but no symptoms , isolation can stop 10 days after the first positive test.  If you have a weak immune system and COVID-19, or if you've had severe COVID-19,  your instructions on when to stop isolation will be somewhat different. Some conditions and treatments can cause a weak immune system. These include cancer treatment, bone marrow or organ transplants, and conditions such as HIV or other immune system disorders. You may be advised to self-isolate up to 20 days after your symptoms first started. Your healthcare provider may want to retest you for COVID-19. Follow your provider's instructions.  CDC mask guidance  Consider the CDC's guidance and your local community's instructions on face masks.     · Cloth masks may help prevent people who have COVID-19 from spreading the virus to others.  · Cloth masks are most likely to reduce COVID-19 spread when masks are widely used by people who are out in the public.  · Wear a mask inside your house if you live with someone who has symptoms of COVID-19 or has tested positive for COVID-19.  · CDC advises all people older than 2 who are not fully vaccinated to wear a mask and stay 6 feet away from others while in public.  · CDC advises people with a weak immune system, even if fully vaccinated, to continue wearing masks and to stay 6 feet away from others while in public.  · In  through grade 12 classes, CDC advises indoor masking for all teachers, staff, students ages 2 and older, and visitors to schools, regardless of vaccination status.  · Like other viruses, the virus that causes COVID-19 changes  (mutates) all the time. This leads to variants that are easily spread, including the delta variant. To protect against variants, CDC advises all people over age 2, including those who are fully vaccinated, to wear a mask indoors in public settings if you are in an area of high numbers of COVID-19 cases. See the [facility]'s county data website for current transmission information in your area.     Certain people should not wear a face mask. This includes:  · Children younger than 2 years old  · Anyone with a health, developmental, or mental health condition that can be made worse by wearing a mask  · Anyone who is unconscious or unable to remove the face covering without help     See the CDC's mask guidance.  When to call your healthcare provider  Call your healthcare provider right away if a sick person has any of these:  · Trouble breathing  · Pain or pressure in chest  If a sick person has any of these, call 911:  · Trouble breathing that gets worse  · Pain or pressure in chest that gets worse  · Blue tint to lips or face  · Fast or irregular heartbeat  · Confusion or trouble waking  · Fainting or loss of consciousness  · Coughing up blood  Going home from the hospital  If you were diagnosed with COVID-19 and were recently discharged from the hospital:  · Follow the instructions above for self-care and isolation.  · Follow the hospital healthcare team’s specific instructions.  · Ask questions if anything is unclear to you. Write down answers so you remember them.  Date last modified: 11/3/2021  Kevin last reviewed this educational content on 4/1/2020    © 2841-9205 [employer], [address] [place] [address], [place] [address]. All rights reserved. This information is not intended as a substitute for professional medical care. Always follow your healthcare professional's instructions. This information has been modified by your health care provider with permission from the publisher.              04-Nov-2019 09:15

## 2022-04-14 ENCOUNTER — APPOINTMENT (OUTPATIENT)
Dept: GASTROENTEROLOGY | Facility: CLINIC | Age: 42
End: 2022-04-14
Payer: MEDICAID

## 2022-04-14 VITALS
HEART RATE: 55 BPM | TEMPERATURE: 97.5 F | HEIGHT: 59 IN | SYSTOLIC BLOOD PRESSURE: 110 MMHG | WEIGHT: 180 LBS | OXYGEN SATURATION: 97 % | BODY MASS INDEX: 36.29 KG/M2 | RESPIRATION RATE: 14 BRPM | DIASTOLIC BLOOD PRESSURE: 64 MMHG

## 2022-04-14 PROCEDURE — 99214 OFFICE O/P EST MOD 30 MIN: CPT

## 2022-04-14 NOTE — ASSESSMENT
[FreeTextEntry1] : A/P\par Patient with worsening constipation\par She will try Benefiber and Colace.\par -Follow-up in 3 months.  If no better than she will have been EGD colonoscopy.  She would require cardiac clearance and this would need to be done at [facility].\par \par -Can also consider MRI defecography next visit as patient may be having dyssynergia.\par \par Patient with internal hemorrhoids we will give Anusol HC cream\par \par Follow-up in 3 months

## 2022-04-14 NOTE — HISTORY OF PRESENT ILLNESS
[de-identified] : The patient is here for with history of AICD cardiomyopathy, NSVT\par    Patient has longstanding history of constipation for 22 years.  She has a bowel movement every 3 days.  Admits to a low fiber diet.  Has known internal hemorrhoids.  Patient has tried MiraLAX which caused diarrhea both 1 capful and half a capful dose.\par    She has a history of GERD with heartburn which occurs 3-4 times a week.  Worse with laying down.  Takes Protonix as needed which is about 4 times a week.\par    For the last few months patient states she eats and she gets left upper quadrant pain.  Her constipation seems worse so she only has a bowel movement twice a week.  Feels that MiraLAX give her loose stools and urgency.  She is having decreased appetite.  She does describe that she has to either lean forward or lean back to have a bowel movement.  No rectal bleeding

## 2022-05-05 ENCOUNTER — APPOINTMENT (OUTPATIENT)
Dept: OBGYN | Facility: CLINIC | Age: 42
End: 2022-05-05

## 2022-05-08 ENCOUNTER — INPATIENT (INPATIENT)
Facility: HOSPITAL | Age: 42
LOS: 1 days | Discharge: HOME | DRG: 310 | End: 2022-05-10
Attending: HOSPITALIST | Admitting: HOSPITALIST
Payer: MEDICAID

## 2022-05-08 VITALS
RESPIRATION RATE: 18 BRPM | WEIGHT: 175.05 LBS | HEIGHT: 59 IN | HEART RATE: 57 BPM | OXYGEN SATURATION: 100 % | TEMPERATURE: 97 F | SYSTOLIC BLOOD PRESSURE: 138 MMHG | DIASTOLIC BLOOD PRESSURE: 77 MMHG

## 2022-05-08 DIAGNOSIS — Z98.891 HISTORY OF UTERINE SCAR FROM PREVIOUS SURGERY: Chronic | ICD-10-CM

## 2022-05-08 DIAGNOSIS — Z95.0 PRESENCE OF CARDIAC PACEMAKER: Chronic | ICD-10-CM

## 2022-05-08 DIAGNOSIS — R07.9 CHEST PAIN, UNSPECIFIED: ICD-10-CM

## 2022-05-08 LAB
ALBUMIN SERPL ELPH-MCNC: 4 G/DL — SIGNIFICANT CHANGE UP (ref 3.3–5.2)
ALP SERPL-CCNC: 67 U/L — SIGNIFICANT CHANGE UP (ref 40–120)
ALT FLD-CCNC: 10 U/L — SIGNIFICANT CHANGE UP
ANION GAP SERPL CALC-SCNC: 12 MMOL/L — SIGNIFICANT CHANGE UP (ref 5–17)
AST SERPL-CCNC: 12 U/L — SIGNIFICANT CHANGE UP
BASOPHILS # BLD AUTO: 0.04 K/UL — SIGNIFICANT CHANGE UP (ref 0–0.2)
BASOPHILS NFR BLD AUTO: 0.5 % — SIGNIFICANT CHANGE UP (ref 0–2)
BILIRUB SERPL-MCNC: 0.3 MG/DL — LOW (ref 0.4–2)
BUN SERPL-MCNC: 13.6 MG/DL — SIGNIFICANT CHANGE UP (ref 8–20)
CALCIUM SERPL-MCNC: 8.9 MG/DL — SIGNIFICANT CHANGE UP (ref 8.6–10.2)
CHLORIDE SERPL-SCNC: 100 MMOL/L — SIGNIFICANT CHANGE UP (ref 98–107)
CO2 SERPL-SCNC: 24 MMOL/L — SIGNIFICANT CHANGE UP (ref 22–29)
CREAT SERPL-MCNC: 0.57 MG/DL — SIGNIFICANT CHANGE UP (ref 0.5–1.3)
EGFR: 118 ML/MIN/1.73M2 — SIGNIFICANT CHANGE UP
EOSINOPHIL # BLD AUTO: 0.2 K/UL — SIGNIFICANT CHANGE UP (ref 0–0.5)
EOSINOPHIL NFR BLD AUTO: 2.4 % — SIGNIFICANT CHANGE UP (ref 0–6)
GLUCOSE SERPL-MCNC: 106 MG/DL — HIGH (ref 70–99)
HCG SERPL-ACNC: <4 MIU/ML — SIGNIFICANT CHANGE UP
HCT VFR BLD CALC: 36.1 % — SIGNIFICANT CHANGE UP (ref 34.5–45)
HGB BLD-MCNC: 11.5 G/DL — SIGNIFICANT CHANGE UP (ref 11.5–15.5)
IMM GRANULOCYTES NFR BLD AUTO: 0.5 % — SIGNIFICANT CHANGE UP (ref 0–1.5)
LYMPHOCYTES # BLD AUTO: 1.54 K/UL — SIGNIFICANT CHANGE UP (ref 1–3.3)
LYMPHOCYTES # BLD AUTO: 18.1 % — SIGNIFICANT CHANGE UP (ref 13–44)
MCHC RBC-ENTMCNC: 28 PG — SIGNIFICANT CHANGE UP (ref 27–34)
MCHC RBC-ENTMCNC: 31.9 GM/DL — LOW (ref 32–36)
MCV RBC AUTO: 88 FL — SIGNIFICANT CHANGE UP (ref 80–100)
MONOCYTES # BLD AUTO: 0.49 K/UL — SIGNIFICANT CHANGE UP (ref 0–0.9)
MONOCYTES NFR BLD AUTO: 5.8 % — SIGNIFICANT CHANGE UP (ref 2–14)
NEUTROPHILS # BLD AUTO: 6.18 K/UL — SIGNIFICANT CHANGE UP (ref 1.8–7.4)
NEUTROPHILS NFR BLD AUTO: 72.7 % — SIGNIFICANT CHANGE UP (ref 43–77)
PLATELET # BLD AUTO: 246 K/UL — SIGNIFICANT CHANGE UP (ref 150–400)
POTASSIUM SERPL-MCNC: 4.3 MMOL/L — SIGNIFICANT CHANGE UP (ref 3.5–5.3)
POTASSIUM SERPL-SCNC: 4.3 MMOL/L — SIGNIFICANT CHANGE UP (ref 3.5–5.3)
PROT SERPL-MCNC: 7 G/DL — SIGNIFICANT CHANGE UP (ref 6.6–8.7)
RBC # BLD: 4.1 M/UL — SIGNIFICANT CHANGE UP (ref 3.8–5.2)
RBC # FLD: 14.4 % — SIGNIFICANT CHANGE UP (ref 10.3–14.5)
SARS-COV-2 RNA SPEC QL NAA+PROBE: SIGNIFICANT CHANGE UP
SODIUM SERPL-SCNC: 136 MMOL/L — SIGNIFICANT CHANGE UP (ref 135–145)
TROPONIN T SERPL-MCNC: <0.01 NG/ML — SIGNIFICANT CHANGE UP (ref 0–0.06)
WBC # BLD: 8.49 K/UL — SIGNIFICANT CHANGE UP (ref 3.8–10.5)
WBC # FLD AUTO: 8.49 K/UL — SIGNIFICANT CHANGE UP (ref 3.8–10.5)

## 2022-05-08 PROCEDURE — 99285 EMERGENCY DEPT VISIT HI MDM: CPT

## 2022-05-08 PROCEDURE — 99223 1ST HOSP IP/OBS HIGH 75: CPT

## 2022-05-08 PROCEDURE — 93282 PRGRMG EVAL IMPLANTABLE DFB: CPT | Mod: 26

## 2022-05-08 PROCEDURE — 71045 X-RAY EXAM CHEST 1 VIEW: CPT | Mod: 26

## 2022-05-08 PROCEDURE — 93010 ELECTROCARDIOGRAM REPORT: CPT

## 2022-05-08 RX ORDER — METOPROLOL TARTRATE 50 MG
50 TABLET ORAL
Refills: 0 | Status: DISCONTINUED | OUTPATIENT
Start: 2022-05-09 | End: 2022-05-10

## 2022-05-08 RX ORDER — METOPROLOL TARTRATE 50 MG
50 TABLET ORAL
Refills: 0 | Status: DISCONTINUED | OUTPATIENT
Start: 2022-05-08 | End: 2022-05-08

## 2022-05-08 RX ORDER — SOTALOL HCL 120 MG
120 TABLET ORAL EVERY 12 HOURS
Refills: 0 | Status: DISCONTINUED | OUTPATIENT
Start: 2022-05-08 | End: 2022-05-09

## 2022-05-08 RX ADMIN — Medication 120 MILLIGRAM(S): at 18:27

## 2022-05-08 NOTE — H&P ADULT - NSHPPHYSICALEXAM_GEN_ALL_CORE
Vital Signs Last 24 Hrs  T(C): 36.1 (08 May 2022 15:04), Max: 36.1 (08 May 2022 15:04)  T(F): 97 (08 May 2022 15:04), Max: 97 (08 May 2022 15:04)  HR: 51 (08 May 2022 18:08) (51 - 57)  BP: 138/77 (08 May 2022 15:04) (138/77 - 138/77)  BP(mean): --  RR: 18 (08 May 2022 15:04) (18 - 18)  SpO2: 100% (08 May 2022 15:04) (100% - 100%)    General appearance: No acute distress, Awake, Alert  HEENT: Normocephalic, Atraumatic, Conjunctiva clear, EOMI  Neck: Supple, No JVD, No tenderness  Lungs: Breath sound equal bilaterally, No wheezes, No rales  Cardiovascular: S1S2, Regular rhythm  Abdomen: Soft, Nontender, Nondistended, No guarding/rebound, Positive bowel sounds  Extremities: No clubbing, No cyanosis, No edema, No calf tenderness  Neuro: Strength equal bilaterally, No tremors  Psychiatric: Appropriate mood, Normal affect
no

## 2022-05-08 NOTE — ED ADULT NURSE NOTE - OBJECTIVE STATEMENT
pt comes to ER after her AICD/PM fired off while her being at work. She states she was sitting down doing paperwork and felt it fire off and she passed out slumping down in the chair but waking up seconds after it fired. Pt is A&Ox4, respirations even and unlabored, NSR on CM, denies any pain.

## 2022-05-08 NOTE — ED PROVIDER NOTE - PHYSICAL EXAMINATION
General: NAD, well appearing  HEENT: Normocephalic, atraumatic  Neck: No apparent stiffness or JVD  Pulm: Chest wall symmetric and nontender, lungs clear to ascultation   Cardiac: Regular rate and regular rhythm  Abdomen: Nontender and nondistended  Skin: Skin is warm, dry and intact without rashes or lesions.  Neuro: No motor or sensory deficits    MSK: No deformity or tenderness

## 2022-05-08 NOTE — PATIENT PROFILE ADULT - FALL HARM RISK - HARM RISK INTERVENTIONS

## 2022-05-08 NOTE — ED ADULT TRIAGE NOTE - CHIEF COMPLAINT QUOTE
pt c/o cp and her pacemaker went off while she was @ work about 30 minutes ago, pt became dizzy and passed out. pt denies hitting head. pt f/u w/ MD Lynn. EKG obtained in triage.

## 2022-05-08 NOTE — ED ADULT NURSE NOTE - ED STAT RN HAND OFF
6/22/2017    Dear Dock Loots,    It has come to our attention that the enclosed required test is due in July. This test was ordered by Dr. Capri Buchanan. Please call for an appointment at [phone].     If you are allergic to iodine or have any qu Handoff

## 2022-05-08 NOTE — ED PROVIDER NOTE - OBJECTIVE STATEMENT
41F PMHx of hypertrophic CM with outflow tract obstruction with NSVT s/p primary prevention ICD ([place] [employer] 04/18), HLD, and obesity presents after syncopal episode at work today where she thinks her ICD activated.  Currently complains of some chest discomfort ("feels like I was shocked").  Otherwise denies pain, bleeding, SOB,  abdominal pain or fevers.  Denies other pertinent medical problems.  Denies any substance use.

## 2022-05-08 NOTE — CONSULT NOTE ADULT - ASSESSMENT
41 year old woman with history of obesity, HLD, hypertrophic cardiomyopathy (HCM) with outflow tract obstruction, NSVT and family history of sudden death s/p Subcutaneous-ICD on 4/20/18, now presenting with recurrent ICD shocks- 2 episodes in last week for VF. No clear triggers for her episode, but she has a highly arrhythmogenic cardiac substrate, and does appear to have increased arrhythmia burden around her menstrual cycle. Very low suspicion for ischemic heart disease given known HCM and young age, with stress test normal in 2015.    -interrogation of pt's S-ICD to be performed today- suspect VF as was seen in event earlier this week  -awaiting full labs. replete lytes K>4, Mg>2  -if renal function normal will start sotalol 120mg bid. With this, will check BID ECG, and reduce dose if QTc >500 ms  -reduce metoprolol to 50mg bid with sotalol     41 year old woman with history of obesity, HLD, hypertrophic cardiomyopathy (HCM) with outflow tract obstruction, NSVT and family history of sudden death s/p Subcutaneous-ICD on 4/20/18, now presenting with recurrent ICD shocks- 2 episodes in last week for VF. No clear triggers for her episode, but she has a highly arrhythmogenic cardiac substrate, and does appear to have increased arrhythmia burden around her menstrual cycle. Very low suspicion for ischemic heart disease given known HCM and young age, with stress test normal in 2015.    -lab reviewed, normal renal function and K level. add on Mg, would keep lytes K>4, Mg>2  -start sotalol 120mg bid. With this, will check BID ECG (2 hours after sotalol dose), and reduce dose if QTc >500 ms  -reduce metoprolol to 50mg bid with sotalol (will hold metoprolol today, restart tomorrow).

## 2022-05-08 NOTE — H&P ADULT - ASSESSMENT
41F with a history of hypertrophic cardiomyopathy who presented with an episodes of syncope and discharge from the defibrillator.    Syncope - Secondary to arrhythmia. No neurological deficits on examination. History was not suggestive of seizure.    Hypertrophic cardiomyopathy - Monitoring on telemetry. Electrophysiology consultation noted. Interrogation of the defibrillator noted two episode of ventricular fibrillation over the past week. For continuation of metoprolol as well as initiation of sotalol. For repeat EKG to monitor.     Seizure - Noted to have been on antiepileptic medications as a child but now no longer requiring medications.    GERD - Intermittent symptoms. Pantoprazole if needed. Diet as tolerated.

## 2022-05-08 NOTE — H&P ADULT - HISTORY OF PRESENT ILLNESS
41F with a history of hypertrophic obstructive cardiomyopathy who presented with an episode of syncope and defibrillator discharge while at work. The patient denied any preceding symptoms. The patient was unaware of any aggravating factors and had been in her usual state of health. The patient reported prior discharge from her defibrillator in the past which she did not fell because she was asleep at the time and was later found upon interrogation of the device. She had been on metoprolol without recent change in the medications. The patient reported some chest discomfort when she awoke and had no confusion or disorientation. The patient does note a history brief episodes of palpitations with mild dizziness without discharge from the defibrillator.

## 2022-05-08 NOTE — CONSULT NOTE ADULT - SUBJECTIVE AND OBJECTIVE BOX
HPI:  41 year old woman with history of obesity, HLD, hypertrophic cardiomyopathy (HCM) with outflow tract obstruction, NSVT and family history of sudden death s/p Subcutaneous-ICD on 18, now presenting with recurrent ICD shocks.    Her S-ICD was initially implanted for primary prevention.  In 2019 she had an episode of ventricular tachycardia and had appropriate defibrillation from her S-ICD. This occurred during sleep and she was unaware, but it was identified during subsequent device follow-up.  On 20, she had another episode of polymorphic VT/VF resulting in a successful ICD shock. The episode did appear to initiate with a PVC which occurred on top of a Twave. She was sleeping when the shock occurred.   She has since been on Metoprolol 200mg qd.     Earlier this week she had another ICD shock during sleep (on Tuesday) - ICD interrogation from this event revealed polymorphic VT/VF successfully terminated with a single shock. She saw her cardiologist Dr. Armas and lab work was reportedly unremarkable. A followup with me had been scheduled.   Today while at work she again had syncope and ICD shock. She denies significant prodrome, and subsequently felt nausea.   She has had rather frequent episodes of mild palpitation c/w “a pause” in the chest and subsequent brief dizziness and nausea. These have occurred in spurts with episodes multiple times per day, but have been more frequent recently. She noted similar symptoms in the past as well.     After her prior shocks given concern for possible PVC-triggered VF, a 3 day MCOT monitor was performed -20 and revealed sinus rhythm with avg HR 64 bpm (range ), rare PVCs (<1%) and NSVT up to 6 beats, and brief runs of SVT  up to 4 beats (one symptom triggered episode c/w with brief SVT).   She has seen Dr. Armas and her outflow gradient has increased- she has been considered for septal ablation/myomectomy.  She has also noted that she has increased arrhythmia around her period, which she had last week. Due to this, she has been in discussion with her cardiologist about a hysterectomy.   Of note she had a baby and delivered via elective  on 11/15/19. She tolerated pregnancy and delivery generally well, but did have subsequent vaginal and leg bleeding. She had postpartum depression in 2020 and briefly took Lexapro.       since admission has been in sinus rhythm in 50s bpm, infrequent PVCs    PAST MEDICAL & SURGICAL HISTORY:  Seizure disorder  Preeclampsia  Hyperlipidemia  Hypertrophic cardiomyopathy  s-ICD implant    H/O:         REVIEW OF SYSTEMS  +nausea and palpitation. baseline BETANCOURT and mild LE edema. denies CP, fevers, chills. otherwise negative other than as per HPI    MEDICATIONS  (STANDING):  home meds Toprol 200mg qd  MEDICATIONS  (PRN):      Allergies    codeine (Vomiting)  sulfa drugs (Seizure)    Intolerances    predniSONE (Other)      SOCIAL HISTORY: has 1 yo daughter at home. no illicits    FAMILY HISTORY: +fam hx of HCM, one with ICD implant      Vital Signs Last 24 Hrs  T(C): 36.1 (08 May 2022 15:04), Max: 36.1 (08 May 2022 15:04)  T(F): 97 (08 May 2022 15:04), Max: 97 (08 May 2022 15:04)  HR: 57 (08 May 2022 15:04) (57 - 57)  BP: 138/77 (08 May 2022 15:04) (138/77 - 138/77)  BP(mean): --  RR: 18 (08 May 2022 15:04) (18 - 18)  SpO2: 100% (08 May 2022 15:04) (100% - 100%)    Physical Exam:  Constitutional: AAOx3, NAD  Neck: supple, No JVD  Cardiovascular: +S1S2 RRR, +SM  Pulmonary: CTA b/l, unlabored, no wheezes, rales. rhonci  Abdomen: +BS, soft NTND  Extremities: no edema b/l, +distal pulses b/l  Neuro: non focal, speech clear, HOLCOMB x 4    LABS:                        11.5   8.49  )-----------( 246      ( 08 May 2022 16:42 )             36.1       RADIOLOGY & ADDITIONAL STUDIES:  < from: TTE Echo Complete w/Doppler (19 @ 15:15) >   1. Technically limited study.   2. Left ventricular ejection fraction, by visual estimation, is 60 to   65%.   3. Normal global left ventricular systolic function.   4. The left ventricular diastolic function could not be assessed in this   study.   5. Normal left ventricular internal cavity size.   6. There is moderate asymmetric leftventricular hypertrophy.   7. Normal left atrial size.   8. There is ANGE noted with moderate to severe posteriorly directed MR.   9. There is no evidence of pericardial effusion.    < end of copied text >    stress - normal nuclear stress, nml LVEF. BP increased from 108/66 126/80     HPI:  41 year old woman with history of obesity, HLD, hypertrophic cardiomyopathy (HCM) with outflow tract obstruction, NSVT and family history of sudden death s/p Subcutaneous-ICD on 18, now presenting with recurrent ICD shocks.    Her S-ICD was initially implanted for primary prevention.  In 2019 she had an episode of ventricular tachycardia and had appropriate defibrillation from her S-ICD. This occurred during sleep and she was unaware, but it was identified during subsequent device follow-up.  On 20, she had another episode of polymorphic VT/VF resulting in a successful ICD shock. The episode did appear to initiate with a PVC which occurred on top of a Twave. She was sleeping when the shock occurred.   She has since been on Metoprolol 200mg qd.     Earlier this week she had another ICD shock during sleep (on Tuesday) - ICD interrogation from this event revealed polymorphic VT/VF successfully terminated with a single shock. She saw her cardiologist Dr. Armas and lab work was reportedly unremarkable. A followup with me had been scheduled.   Today while at work she again had syncope and ICD shock. She denies significant prodrome, and subsequently felt nausea.   She has had rather frequent episodes of mild palpitation c/w “a pause” in the chest and subsequent brief dizziness and nausea. These have occurred in spurts with episodes multiple times per day, but have been more frequent recently. She noted similar symptoms in the past as well.     After her prior shocks given concern for possible PVC-triggered VF, a 3 day MCOT monitor was performed -20 and revealed sinus rhythm with avg HR 64 bpm (range ), rare PVCs (<1%) and NSVT up to 6 beats, and brief runs of SVT  up to 4 beats (one symptom triggered episode c/w with brief SVT).   She has seen Dr. Armas and her outflow gradient has increased- she has been considered for septal ablation/myomectomy.  She has also noted that she has increased arrhythmia around her period, which she had last week. Due to this, she has been in discussion with her cardiologist about a hysterectomy.   Of note she had a baby and delivered via elective  on 11/15/19. She tolerated pregnancy and delivery generally well, but did have subsequent vaginal and leg bleeding. She had postpartum depression in 2020 and briefly took Lexapro.     since admission has been in sinus rhythm in 50s bpm, infrequent PVCs    ECG on admission sinus rhythm 52 bpm, QTc 455 ms (baseline ST/Tw abnormalities)    PAST MEDICAL & SURGICAL HISTORY:  Seizure disorder  Preeclampsia  Hyperlipidemia  Hypertrophic cardiomyopathy  s-ICD implant    H/O:         REVIEW OF SYSTEMS  +nausea and palpitation. baseline BETANCOURT and mild LE edema. denies CP, fevers, chills. otherwise negative other than as per HPI    MEDICATIONS  (STANDING):  home meds Toprol 200mg qd  MEDICATIONS  (PRN):      Allergies    codeine (Vomiting)  sulfa drugs (Seizure)    Intolerances    predniSONE (Other)      SOCIAL HISTORY: has 1 yo daughter at home. no illicits    FAMILY HISTORY: +fam hx of HCM, one with ICD implant      Vital Signs Last 24 Hrs  T(C): 36.1 (08 May 2022 15:04), Max: 36.1 (08 May 2022 15:04)  T(F): 97 (08 May 2022 15:04), Max: 97 (08 May 2022 15:04)  HR: 57 (08 May 2022 15:04) (57 - 57)  BP: 138/77 (08 May 2022 15:04) (138/77 - 138/77)  BP(mean): --  RR: 18 (08 May 2022 15:04) (18 - 18)  SpO2: 100% (08 May 2022 15:04) (100% - 100%)    Physical Exam:  Constitutional: AAOx3, NAD  Neck: supple, No JVD  Cardiovascular: +S1S2 RRR, +SM  Pulmonary: CTA b/l, unlabored, no wheezes, rales. rhonci  Abdomen: +BS, soft NTND  Extremities: no edema b/l, +distal pulses b/l  Neuro: non focal, speech clear, HOLCOMB x 4    LABS:                        11.5   8.49  )-----------( 246      ( 08 May 2022 16:42 )             36.1       RADIOLOGY & ADDITIONAL STUDIES:  < from: TTE Echo Complete w/Doppler (19 @ 15:15) >   1. Technically limited study.   2. Left ventricular ejection fraction, by visual estimation, is 60 to   65%.   3. Normal global left ventricular systolic function.   4. The left ventricular diastolic function could not be assessed in this   study.   5. Normal left ventricular internal cavity size.   6. There is moderate asymmetric leftventricular hypertrophy.   7. Normal left atrial size.   8. There is ANGE noted with moderate to severe posteriorly directed MR.   9. There is no evidence of pericardial effusion.    < end of copied text >    stress 2015- normal nuclear stress, nml LVEF. BP increased from 108/66 126/80     HPI:  41 year old woman with history of obesity, HLD, hypertrophic cardiomyopathy (HCM) with outflow tract obstruction, NSVT and family history of sudden death s/p Subcutaneous-ICD on 18, now presenting with recurrent ICD shocks.    Her S-ICD was initially implanted for primary prevention.  In 2019 she had an episode of ventricular tachycardia and had appropriate defibrillation from her S-ICD. This occurred during sleep and she was unaware, but it was identified during subsequent device follow-up.  On 20, she had another episode of polymorphic VT/VF resulting in a successful ICD shock. The episode did appear to initiate with a PVC which occurred on top of a Twave. She was sleeping when the shock occurred.   She has since been on Metoprolol 200mg qd.     Earlier this week she had another ICD shock during sleep (on Tuesday) - ICD interrogation from this event revealed polymorphic VT/VF successfully terminated with a single shock. She saw her cardiologist Dr. Armas and lab work was reportedly unremarkable. A followup with me had been scheduled.   Today while at work she again had syncope and ICD shock. She denies significant prodrome, and subsequently felt nausea.   She has had rather frequent episodes of mild palpitation c/w “a pause” in the chest and subsequent brief dizziness and nausea. These have occurred in spurts with episodes multiple times per day, but have been more frequent recently. She noted similar symptoms in the past as well.     After her prior shocks given concern for possible PVC-triggered VF, a 3 day MCOT monitor was performed -20 and revealed sinus rhythm with avg HR 64 bpm (range ), rare PVCs (<1%) and NSVT up to 6 beats, and brief runs of SVT  up to 4 beats (one symptom triggered episode c/w with brief SVT).   She has seen Dr. Armas and her outflow gradient has increased- she has been considered for septal ablation/myomectomy.  She has also noted that she has increased arrhythmia around her period, which she had last week. Due to this, she has been in discussion with her cardiologist about a hysterectomy.   Of note she had a baby and delivered via elective  on 11/15/19. She tolerated pregnancy and delivery generally well, but did have subsequent vaginal and leg bleeding. She had postpartum depression in 2020 and briefly took Lexapro.     since admission has been in sinus rhythm in 50s bpm, infrequent PVCs    ECG on admission sinus rhythm 52 bpm, QTc 455 ms (baseline ST/Tw abnormalities)    ICD interrogated in ED- c/w VF and appropriate ICD shock today    PAST MEDICAL & SURGICAL HISTORY:  Seizure disorder  Preeclampsia  Hyperlipidemia  Hypertrophic cardiomyopathy  s-ICD implant    H/O:         REVIEW OF SYSTEMS  +nausea and palpitation. baseline BETANCOURT and mild LE edema. denies CP, fevers, chills. otherwise negative other than as per HPI    MEDICATIONS  (STANDING):  home meds Toprol 200mg qd  MEDICATIONS  (PRN):      Allergies    codeine (Vomiting)  sulfa drugs (Seizure)    Intolerances    predniSONE (Other)      SOCIAL HISTORY: has 3 yo daughter at home. no illicits    FAMILY HISTORY: +fam hx of HCM, one with ICD implant      Vital Signs Last 24 Hrs  T(C): 36.1 (08 May 2022 15:04), Max: 36.1 (08 May 2022 15:04)  T(F): 97 (08 May 2022 15:04), Max: 97 (08 May 2022 15:04)  HR: 57 (08 May 2022 15:04) (57 - 57)  BP: 138/77 (08 May 2022 15:04) (138/77 - 138/77)  BP(mean): --  RR: 18 (08 May 2022 15:04) (18 - 18)  SpO2: 100% (08 May 2022 15:04) (100% - 100%)    Physical Exam:  Constitutional: AAOx3, NAD  Neck: supple, No JVD  Cardiovascular: +S1S2 RRR, +SM  Pulmonary: CTA b/l, unlabored, no wheezes, rales. rhonci  Abdomen: +BS, soft NTND  Extremities: no edema b/l, +distal pulses b/l  Neuro: non focal, speech clear, HOLCOMB x 4    LABS:                        11.5   8.49  )-----------( 246      ( 08 May 2022 16:42 )             36.1       RADIOLOGY & ADDITIONAL STUDIES:  < from: TTE Echo Complete w/Doppler (19 @ 15:15) >   1. Technically limited study.   2. Left ventricular ejection fraction, by visual estimation, is 60 to   65%.   3. Normal global left ventricular systolic function.   4. The left ventricular diastolic function could not be assessed in this   study.   5. Normal left ventricular internal cavity size.   6. There is moderate asymmetric leftventricular hypertrophy.   7. Normal left atrial size.   8. There is ANGE noted with moderate to severe posteriorly directed MR.   9. There is no evidence of pericardial effusion.    < end of copied text >    stress - normal nuclear stress, nml LVEF. BP increased from 108/66 126/80

## 2022-05-08 NOTE — ED PROVIDER NOTE - ATTENDING CONTRIBUTION TO CARE
seen with resident, well appearing female with hocm, sub q aicd in place; presents with syncopal episode and icd shock while at work; currently feels well; on exma, NAD, well appearing, normal heart and lung sounds; icd device in left axillary region; abd soft nt nd; trace pedal edema; labs and imaging reviewed; EP consult appreciated, admit to start sotalol load.

## 2022-05-08 NOTE — ED ADULT NURSE REASSESSMENT NOTE - NS ED NURSE REASSESS COMMENT FT1
Assumed care of pt fro previous RN. A/O x3. Respirations are even and unlabored. VS stable at this time. EKG performed 2 hours after Sotalol dose abd given to MD. Pt educated on plan of care and expresses understanding.

## 2022-05-08 NOTE — ED PROVIDER NOTE - NSICDXPASTMEDICALHX_GEN_ALL_CORE_FT
PAST MEDICAL HISTORY:  Hyperlipidemia     Hypertrophic cardiomyopathy Pacemaker 4-17    Palpitations     Pre-eclampsia, antepartum 2012    Preeclampsia     Seizure disorder

## 2022-05-08 NOTE — H&P ADULT - NSHPSOCIALHISTORY_GEN_ALL_CORE
Denied tobacco, alcohol, or illicit drug use  Lives at home with her     Family History: Uncle with HOCPAULETTE

## 2022-05-08 NOTE — ED PROVIDER NOTE - CLINICAL SUMMARY MEDICAL DECISION MAKING FREE TEXT BOX
41F PMHx of hypertrophic CM with outflow tract obstruction with NSVT s/p primary prevention ICD ([place] [employer] 04/18), HLD, and obesity presents after syncopal episode at work today.   EKG labs and imaging performed to evaluate the patient. 41F PMHx of hypertrophic CM with outflow tract obstruction with NSVT s/p primary prevention ICD ([place] [employer] 04/18), HLD, and obesity presents after syncopal episode at work today.   EKG labs and imaging performed to evaluate the patient. Dr. Lynn is at bedside, admit for further management.

## 2022-05-08 NOTE — ED ADULT TRIAGE NOTE - OTHER COMPLAINTS
patient a&ox4, vss, came in w/ complaints of cp, pt placed on cardiac monior/, respirations even and unlabored, pt chest pain

## 2022-05-09 LAB
ANION GAP SERPL CALC-SCNC: 10 MMOL/L — SIGNIFICANT CHANGE UP (ref 5–17)
BUN SERPL-MCNC: 11.3 MG/DL — SIGNIFICANT CHANGE UP (ref 8–20)
CALCIUM SERPL-MCNC: 8.8 MG/DL — SIGNIFICANT CHANGE UP (ref 8.6–10.2)
CHLORIDE SERPL-SCNC: 104 MMOL/L — SIGNIFICANT CHANGE UP (ref 98–107)
CO2 SERPL-SCNC: 25 MMOL/L — SIGNIFICANT CHANGE UP (ref 22–29)
CREAT SERPL-MCNC: 0.61 MG/DL — SIGNIFICANT CHANGE UP (ref 0.5–1.3)
EGFR: 116 ML/MIN/1.73M2 — SIGNIFICANT CHANGE UP
GLUCOSE SERPL-MCNC: 86 MG/DL — SIGNIFICANT CHANGE UP (ref 70–99)
MAGNESIUM SERPL-MCNC: 2.1 MG/DL — SIGNIFICANT CHANGE UP (ref 1.8–2.6)
POTASSIUM SERPL-MCNC: 4.3 MMOL/L — SIGNIFICANT CHANGE UP (ref 3.5–5.3)
POTASSIUM SERPL-SCNC: 4.3 MMOL/L — SIGNIFICANT CHANGE UP (ref 3.5–5.3)
SODIUM SERPL-SCNC: 139 MMOL/L — SIGNIFICANT CHANGE UP (ref 135–145)

## 2022-05-09 PROCEDURE — 93010 ELECTROCARDIOGRAM REPORT: CPT | Mod: 77

## 2022-05-09 PROCEDURE — 99233 SBSQ HOSP IP/OBS HIGH 50: CPT

## 2022-05-09 PROCEDURE — 93010 ELECTROCARDIOGRAM REPORT: CPT

## 2022-05-09 RX ORDER — SOTALOL HCL 120 MG
80 TABLET ORAL EVERY 12 HOURS
Refills: 0 | Status: DISCONTINUED | OUTPATIENT
Start: 2022-05-09 | End: 2022-05-10

## 2022-05-09 RX ADMIN — Medication 80 MILLIGRAM(S): at 18:15

## 2022-05-09 RX ADMIN — Medication 120 MILLIGRAM(S): at 05:45

## 2022-05-09 RX ADMIN — Medication 50 MILLIGRAM(S): at 18:15

## 2022-05-09 RX ADMIN — Medication 50 MILLIGRAM(S): at 05:45

## 2022-05-10 ENCOUNTER — TRANSCRIPTION ENCOUNTER (OUTPATIENT)
Age: 42
End: 2022-05-10

## 2022-05-10 VITALS
HEART RATE: 70 BPM | OXYGEN SATURATION: 95 % | SYSTOLIC BLOOD PRESSURE: 145 MMHG | DIASTOLIC BLOOD PRESSURE: 76 MMHG | TEMPERATURE: 98 F | RESPIRATION RATE: 18 BRPM

## 2022-05-10 LAB
ANION GAP SERPL CALC-SCNC: 10 MMOL/L — SIGNIFICANT CHANGE UP (ref 5–17)
BUN SERPL-MCNC: 13.8 MG/DL — SIGNIFICANT CHANGE UP (ref 8–20)
CALCIUM SERPL-MCNC: 8.3 MG/DL — LOW (ref 8.6–10.2)
CHLORIDE SERPL-SCNC: 102 MMOL/L — SIGNIFICANT CHANGE UP (ref 98–107)
CO2 SERPL-SCNC: 24 MMOL/L — SIGNIFICANT CHANGE UP (ref 22–29)
CREAT SERPL-MCNC: 0.53 MG/DL — SIGNIFICANT CHANGE UP (ref 0.5–1.3)
EGFR: 120 ML/MIN/1.73M2 — SIGNIFICANT CHANGE UP
GLUCOSE SERPL-MCNC: 84 MG/DL — SIGNIFICANT CHANGE UP (ref 70–99)
MAGNESIUM SERPL-MCNC: 2.2 MG/DL — SIGNIFICANT CHANGE UP (ref 1.6–2.6)
POTASSIUM SERPL-MCNC: 4.2 MMOL/L — SIGNIFICANT CHANGE UP (ref 3.5–5.3)
POTASSIUM SERPL-SCNC: 4.2 MMOL/L — SIGNIFICANT CHANGE UP (ref 3.5–5.3)
SODIUM SERPL-SCNC: 136 MMOL/L — SIGNIFICANT CHANGE UP (ref 135–145)

## 2022-05-10 PROCEDURE — 93010 ELECTROCARDIOGRAM REPORT: CPT

## 2022-05-10 PROCEDURE — 99239 HOSP IP/OBS DSCHRG MGMT >30: CPT

## 2022-05-10 PROCEDURE — 99233 SBSQ HOSP IP/OBS HIGH 50: CPT

## 2022-05-10 RX ORDER — METOPROLOL TARTRATE 50 MG
25 TABLET ORAL
Refills: 0 | Status: DISCONTINUED | OUTPATIENT
Start: 2022-05-10 | End: 2022-05-10

## 2022-05-10 RX ORDER — METOPROLOL TARTRATE 50 MG
1 TABLET ORAL
Qty: 60 | Refills: 0
Start: 2022-05-10 | End: 2022-06-08

## 2022-05-10 RX ORDER — SOTALOL HCL 120 MG
1 TABLET ORAL
Qty: 60 | Refills: 0
Start: 2022-05-10 | End: 2022-06-08

## 2022-05-10 RX ADMIN — Medication 50 MILLIGRAM(S): at 05:44

## 2022-05-10 RX ADMIN — Medication 80 MILLIGRAM(S): at 05:43

## 2022-05-10 NOTE — PROGRESS NOTE ADULT - ASSESSMENT
41 year old woman with history of obesity, HLD, hypertrophic cardiomyopathy (HCM) with outflow tract obstruction, NSVT and family history of sudden death s/p Subcutaneous-ICD on 4/20/18, now presenting with recurrent ICD shocks- 2 episodes in last week for VF. No clear triggers for her episode, but she has a highly arrhythmogenic cardiac substrate, and does appear to have increased arrhythmia burden around her menstrual cycle. Very low suspicion for ischemic heart disease given known HCM and young age, with stress test normal in 2015.    Recommendations:   HCM w/ VF s/p appropriate ICD shocks:   - QTc now prolonged. Sotalol decreased to 80mg q 12 hours.   - Reassess ECG 2 hours after each sotalol dose for next 2 doses and discontinue if QTc remains >500 ms.   - reduce metoprolol to 50mg bid with sotalol.    D/W Dr. Lynn; further recs to follow.   
41 year old woman with history of obesity, HLD, hypertrophic cardiomyopathy (HCM) with outflow tract obstruction, NSVT and family history of sudden death s/p Subcutaneous-ICD on 4/20/18, now presenting with recurrent ICD shocks- 2 episodes in last week for VF. No clear triggers for her episode, but she has a highly arrhythmogenic cardiac substrate, and does appear to have increased arrhythmia burden around her menstrual cycle. Very low suspicion for ischemic heart disease given known HCM and young age, with stress test normal in 2015.    Recommendations:   HCM w/ VF s/p appropriate ICD shocks:   - Sotalol decreased to 80mg q 12 hours. QTc now stable at 480s-490s.   - Will reduce metoprolol succinate to 25mg bid.  - Pt scheduled for f/up w/ Dr. Lynn at [facility] 5/11.     Reviewed & seen w/ Dr. Lynn.   No barriers to d/c from EP service perspective.   Will sign off as to EP. Please call EP service with questions, concerns or further arrhythmia issues.   Further dispo per primary team.

## 2022-05-10 NOTE — DISCHARGE NOTE NURSING/CASE MANAGEMENT/SOCIAL WORK - PATIENT PORTAL LINK FT
----- Message from Patric Fuller sent at 4/3/2017 12:51 PM CDT -----  Contact: [facility] - [facility]   States that she is calling jamshid Vaughan corresponding with another nurse rg the pt and wants to let the nurse/ Dr know the information was updated and has been admitted into home health and has moved in with another daughter and was seen on Friday and Saturday and an be reached at [phone]//thanks/dbw   
Call Shameka back at the home health and let her know that this is not my patient.  I have never seen the patient.  
Notified Diogenes  
You can access the FollowMyHealth Patient Portal offered by [facility] by registering at the following website: [email or web address]. By joining [employer]’s FollowMyHealth portal, you will also be able to view your health information using other applications (apps) compatible with our system.

## 2022-05-10 NOTE — DISCHARGE NOTE PROVIDER - NSDCFUSCHEDAPPT_GEN_ALL_CORE_FT
Amber Mckenzie  [facility]  OBGYNGEN [address]   Scheduled Appointment: 05/24/2022    Agustina Fan  [facility]  Gastro [address]  Scheduled Appointment: 07/14/2022

## 2022-05-10 NOTE — PROGRESS NOTE ADULT - SUBJECTIVE AND OBJECTIVE BOX
GABRIELA LESLIE  ----------------------------------------  The patient was seen at bedside. Patient with syncope and defibrillator discharge. Reported headache which she related to seasonal allergies. Also noted feeling of fatigue.    Vital Signs Last 24 Hrs  T(C): 36.8 (09 May 2022 11:44), Max: 36.8 (09 May 2022 11:44)  T(F): 98.2 (09 May 2022 11:44), Max: 98.2 (09 May 2022 11:44)  HR: 63 (09 May 2022 11:44) (51 - 63)  BP: 107/73 (09 May 2022 11:44) (97/66 - 138/77)  BP(mean): --  RR: 18 (09 May 2022 11:44) (16 - 20)  SpO2: 97% (09 May 2022 11:44) (97% - 100%)    PHYSICAL EXAMINATION:  ----------------------------------------  General appearance: No acute distress, Awake, Alert  HEENT: Normocephalic, Atraumatic, Conjunctiva clear, EOMI  Neck: Supple, No JVD, No tenderness  Lungs: Breath sound equal bilaterally, No wheezes, No rales  Cardiovascular: S1S2, Regular rhythm  Abdomen: Soft, Nontender, Nondistended, No guarding/rebound, Positive bowel sounds  Extremities: No clubbing, No cyanosis, No edema, No calf tenderness  Neuro: Strength equal bilaterally, No tremors  Psychiatric: Appropriate mood, Normal affect    LABORATORY STUDIES:  ----------------------------------------             11.5   8.49  )-----------( 246      ( 08 May 2022 16:42 )             36.1     05-09    139  |  104  |  11.3  ----------------------------<  86  4.3   |  25.0  |  0.61    Ca    8.8      09 May 2022 07:34  Mg     2.1     05-09    TPro  7.0  /  Alb  4.0  /  TBili  0.3<L>  /  DBili  x   /  AST  12  /  ALT  10  /  AlkPhos  67  05-08    LIVER FUNCTIONS - ( 08 May 2022 16:42 )  Alb: 4.0 g/dL / Pro: 7.0 g/dL / ALK PHOS: 67 U/L / ALT: 10 U/L / AST: 12 U/L / GGT: x           CARDIAC MARKERS ( 08 May 2022 16:42 )  x     / <0.01 ng/mL / x     / x     / x        MEDICATIONS  (STANDING):  metoprolol tartrate 50 milliGRAM(s) Oral two times a day  sotalol 120 milliGRAM(s) Oral every 12 hours      ASSESSMENT / PLAN:  ----------------------------------------  41F with a history of hypertrophic cardiomyopathy who presented with an episodes of syncope and discharge from the defibrillator. Interrogation of the defibrillator noted episodes of ventricular fibrillation and sotalol was initiated.    Syncope - Secondary to arrhythmia. No neurological deficits on examination. History was not suggestive of seizure.    Hypertrophic cardiomyopathy - Monitoring on telemetry. Electrophysiology consultation noted. Interrogation of the defibrillator noted two episode of ventricular fibrillation over the past week. On metoprolol and sotalol. Repeat EKG noted a QTc of 520. For reduction of sotalol dose.    Seizure - Noted to have been on antiepileptic medications as a child but now no longer requiring medications.    GERD - Intermittent symptoms. Pantoprazole if needed. Diet as tolerated.
GABRIELA LESLIE  ----------------------------------------  The patient was seen at bedside. Patient with ventricular fibrillation. Reported mild headache and some drowsiness.    Vital Signs Last 24 Hrs  T(C): 36.7 (10 May 2022 08:39), Max: 36.9 (09 May 2022 16:40)  T(F): 98.1 (10 May 2022 08:39), Max: 98.5 (09 May 2022 16:40)  HR: 51 (10 May 2022 08:39) (51 - 74)  BP: 103/64 (10 May 2022 08:39) (103/64 - 123/74)  BP(mean): --  RR: 18 (10 May 2022 08:39) (16 - 18)  SpO2: 95% (10 May 2022 08:39) (95% - 98%)    PHYSICAL EXAMINATION:  ----------------------------------------  General appearance: No acute distress, Awake, Alert  HEENT: Normocephalic, Atraumatic, Conjunctiva clear, EOMI  Neck: Supple, No JVD, No tenderness  Lungs: Breath sound equal bilaterally, No wheezes, No rales  Cardiovascular: S1S2, Regular rhythm  Abdomen: Soft, Nontender, Nondistended, No guarding/rebound, Positive bowel sounds  Extremities: No clubbing, No cyanosis, No edema, No calf tenderness  Neuro: Strength equal bilaterally, No tremors  Psychiatric: Appropriate mood, Normal affect    LABORATORY STUDIES:  ----------------------------------------             11.5   8.49  )-----------( 246      ( 08 May 2022 16:42 )             36.1     05-10    136  |  102  |  13.8  ----------------------------<  84  4.2   |  24.0  |  0.53    Ca    8.3<L>      10 May 2022 07:50  Mg     2.2     05-10    TPro  7.0  /  Alb  4.0  /  TBili  0.3<L>  /  DBili  x   /  AST  12  /  ALT  10  /  AlkPhos  67  05-08    LIVER FUNCTIONS - ( 08 May 2022 16:42 )  Alb: 4.0 g/dL / Pro: 7.0 g/dL / ALK PHOS: 67 U/L / ALT: 10 U/L / AST: 12 U/L / GGT: x           CARDIAC MARKERS ( 08 May 2022 16:42 )  x     / <0.01 ng/mL / x     / x     / x        MEDICATIONS  (STANDING):  metoprolol succinate ER 25 milliGRAM(s) Oral two times a day  sotalol 80 milliGRAM(s) Oral every 12 hours      ASSESSMENT / PLAN:  ----------------------------------------  41F with a history of hypertrophic cardiomyopathy who presented with an episodes of syncope and discharge from the defibrillator. Interrogation of the defibrillator noted episodes of ventricular fibrillation and sotalol was initiated.    Syncope - Secondary to arrhythmia. No neurological deficits on examination.    Hypertrophic cardiomyopathy - Monitoring on telemetry. Discussed with Electrophysiology. For decrease in the dose of metoprolol and continuation of sotalol. For further follow up on an outpatient basis and further discuss possible septal ablation/myometomy.    Seizure - Noted to have been on antiepileptic medications as a child but now no longer requiring medications.    GERD - Intermittent symptoms. Pantoprazole if needed. Diet as tolerated.
Pt stable overnight without event. Tele: sinus rhythm 50s - 60s bpm w/ intact conduction, no recurrent arrhythmias.   EKG this AM w/ sinus rhythm at 50s bpm, intact conduction, QT/QTc by manual measurement 510-520ms/482-491ms.   MEDICATIONS  (STANDING):  metoprolol succinate ER 25 milliGRAM(s) Oral two times a day  sotalol 80 milliGRAM(s) Oral every 12 hours      Allergies  codeine (Vomiting)  sulfa drugs (Seizure)    Intolerances  predniSONE (Other)      Vital Signs Last 24 Hrs  T(C): 36.7 (10 May 2022 08:39), Max: 36.9 (09 May 2022 16:40)  T(F): 98.1 (10 May 2022 08:39), Max: 98.5 (09 May 2022 16:40)  HR: 51 (10 May 2022 08:39) (51 - 74)  BP: 103/64 (10 May 2022 08:39) (103/64 - 123/74)  RR: 18 (10 May 2022 08:39) (16 - 18)  SpO2: 95% (10 May 2022 08:39) (95% - 98%)    Physical Exam:  Constitutional: NAD, AAOx3  Cardiovascular: +S1S2 RRR  Pulmonary: CTA b/l, unlabored  GI: soft NTND +BS  Extremities: no pedal edema, +distal pulses b/l  Neuro: non focal, HOLCOMB x4    LABS:                      11.5   8.49  )-----------( 246      ( 08 May 2022 16:42 )             36.1     05-10    136  |  102  |  13.8  ----------------------------<  84  4.2   |  24.0  |  0.53    Ca    8.3<L>      10 May 2022 07:50  Mg     2.2     05-10    TPro  7.0  /  Alb  4.0  /  TBili  0.3<L>  /  DBili  x   /  AST  12  /  ALT  10  /  AlkPhos  67  05-08    
Pt stable overnight. Denies complaint. Tele: 1 episode NSVT x ~14beats; otherwise sinus rhythm w/ intact conduction; no significant bradycardia or sustained tachyarrhythmias.   EKG this AM w/ QTc ~520ms (baseline ~460ms).     MEDICATIONS  (STANDING):  metoprolol tartrate 50 milliGRAM(s) Oral two times a day  sotalol 80 milliGRAM(s) Oral every 12 hours      Allergies  codeine (Vomiting)  sulfa drugs (Seizure)    Intolerances  predniSONE (Other)      Vital Signs Last 24 Hrs  T(C): 36.8 (09 May 2022 11:44), Max: 36.8 (09 May 2022 11:44)  T(F): 98.2 (09 May 2022 11:44), Max: 98.2 (09 May 2022 11:44)  HR: 63 (09 May 2022 11:44) (51 - 63)  BP: 107/73 (09 May 2022 11:44) (97/66 - 138/77)  RR: 18 (09 May 2022 11:44) (16 - 20)  SpO2: 97% (09 May 2022 11:44) (97% - 100%)    Physical Exam:  Constitutional: NAD, AAOx3  Cardiovascular: +S1S2 RRR  Pulmonary: CTA b/l, unlabored  GI: soft NTND +BS  Extremities: no edema, +distal pulses b/l  Neuro: non focal, HOLCOMB x4    LABS:                      11.5   8.49  )-----------( 246      ( 08 May 2022 16:42 )             36.1     139  |  104  |  11.3  ----------------------------<  86  4.3   |  25.0  |  0.61    Ca    8.8      09 May 2022 07:34  Mg     2.1     05-09    TPro  7.0  /  Alb  4.0  /  TBili  0.3<L>  /  DBili  x   /  AST  12  /  ALT  10  /  AlkPhos  67  05-08    RADIOLOGY & ADDITIONAL TESTS:  < from: TTE Echo Complete w/Doppler (11.01.19 @ 15:15) >  PHYSICIAN INTERPRETATION:  Left Ventricle: The left ventricular internal cavity size is normal.   There is moderate asymmetric left ventricular hypertrophy involving the   septal wall. The LVH involves septal walls.  Global LV systolic function was normal. Left ventricular ejection   fraction, by visual estimation, is 60 to 65%. The left ventricular   diastolic function could not be assessed in this study.  Right Ventricle: The right ventricular size is normal. RV systolic   function is normal.  Left Atrium: Normal left atrial size.  Right Atrium: Normal right atrial size.  Pericardium: There is no evidence of pericardial effusion.  Mitral Valve: The mitral valve is normal in structure. No evidence of   mitral stenosis. Moderate to severe mitral valve regurgitation is seen.   There is ANGE noted with posterior directed MR.  Tricuspid Valve: The tricuspid valve is not well seen. Adequate TR   velocity was not obtained to accurately assess RVSP.  Aortic Valve: The aortic valve was not well visualized.  Pulmonic Valve: The pulmonic valve was not well visualized.  Aorta: The aortic root is normal in size and structure.       Summary:   1. Technically limited study.   2. Left ventricular ejection fraction, by visual estimation, is 60 to   65%.   3. Normal global left ventricular systolic function.   4. The left ventricular diastolic function could not be assessed in this   study.   5. Normal left ventricular internal cavity size.   6. There is moderate asymmetric leftventricular hypertrophy.   7. Normal left atrial size.   8. There is ANGE noted with moderate to severe posteriorly directed MR.   9. There is no evidence of pericardial effusion.    MD Ar Electronically signed on 11/1/2019 at 5:32:26 PM   *** Final ***  < end of copied text >

## 2022-05-10 NOTE — DISCHARGE NOTE PROVIDER - NSDCMRMEDTOKEN_GEN_ALL_CORE_FT
acetaminophen 325 mg oral tablet: 3 tab(s) orally every 6 hours, As Needed -for moderate pain   ibuprofen 600 mg oral tablet: 1 tab(s) orally every 6 hours, As Needed -for mild pain   metoprolol succinate 100 mg oral tablet, extended release: 1 tab(s) orally 2 times a day  prenatal vitamins: once a day  simvastatin 20 mg oral tablet: 1 tab(s) orally once a day (at bedtime)

## 2022-05-10 NOTE — PROGRESS NOTE ADULT - NS ATTEND AMEND GEN_ALL_CORE FT
pt feeling well, no further ventricular arrhythmia. QTc elevated to >500 ms on sotalol 120 mg, will lower to 80mg bid and continue to monitor ECGs as above.
Pt feeling well, anxious to leave.   QTc 490 ms today with lower dose of sotalol (80mg bid).   HR remains in 50s bpm. will lower metoprolol to 25mg bid as well.   She has a followup in cardiology office tomorrow- will check ECG at that time as well.

## 2022-05-10 NOTE — DISCHARGE NOTE PROVIDER - CARE PROVIDER_API CALL
Junior Lynn)  Cardiology; Internal Medicine  [address] [facility], [address] [place] [address]  Phone: [phone]  Fax: [phone]  Follow Up Time:

## 2022-05-10 NOTE — DISCHARGE NOTE PROVIDER - NSDCCPCAREPLAN_GEN_ALL_CORE_FT
PRINCIPAL DISCHARGE DIAGNOSIS  Diagnosis: Mild hypertrophic obstructive cardiomyopathy  Assessment and Plan of Treatment: Continue on the lower dose of metoprolol and sotalol. Follow up with Dr. Lynn tomorrow as scheduled.

## 2022-05-10 NOTE — DISCHARGE NOTE PROVIDER - HOSPITAL COURSE
41F presented with an episode of syncope at as well as discharge from her defibrillator. The patient had noted prior episodes of discharge from the defibrillator that occurred when she was asleep and noted on interrogation of the device. The patient was seen by Electrophysiology and the AICD was interrogated with records of two episodes of ventricular fibrillation over the prior week. Sotalol was initiated and the patient monitored on telemetry. The medications were further adjusted and the patient was  thought to be suitable for further management on an outpatient basis.        35 minutes total time

## 2022-05-10 NOTE — DISCHARGE NOTE PROVIDER - DATE OF DISCHARGE SERVICE:
Onofre [facility] Testing Department  Phone: [phone]  OUTSIDE TESTING RESULT REQUEST      TO:   Dr Brian Rosenberg Date: 9/9/21    FAX #: [phone]     IMPORTANT: FOR YOUR IMMEDIATE ATTENTION  Please FAX all test results listed below to: [phone] 10-May-2022 11:15

## 2022-05-10 NOTE — DISCHARGE NOTE NURSING/CASE MANAGEMENT/SOCIAL WORK - NSDCPEFALRISK_GEN_ALL_CORE
For information on Fall & Injury Prevention, visit: [email or web address] OR  [email or web address] OR  https://www.cdc.gov/steadi/patient.html

## 2022-05-24 ENCOUNTER — APPOINTMENT (OUTPATIENT)
Dept: OBGYN | Facility: CLINIC | Age: 42
End: 2022-05-24
Payer: MEDICAID

## 2022-05-24 VITALS
BODY MASS INDEX: 36.49 KG/M2 | HEIGHT: 59 IN | SYSTOLIC BLOOD PRESSURE: 119 MMHG | WEIGHT: 181 LBS | DIASTOLIC BLOOD PRESSURE: 68 MMHG | HEART RATE: 75 BPM

## 2022-05-24 DIAGNOSIS — Z00.00 ENCOUNTER FOR GENERAL ADULT MEDICAL EXAMINATION W/OUT ABNORMAL FINDINGS: ICD-10-CM

## 2022-05-24 PROCEDURE — 99396 PREV VISIT EST AGE 40-64: CPT

## 2022-05-24 NOTE — PHYSICAL EXAM
[Chaperone Present] : A chaperone was present in the examining room during all aspects of the physical examination [Appropriately responsive] : appropriately responsive [Alert] : alert [No Acute Distress] : no acute distress [No Lymphadenopathy] : no lymphadenopathy [Regular Rate Rhythm] : regular rate rhythm [No Murmurs] : no murmurs [Clear to Auscultation B/L] : clear to auscultation bilaterally [Soft] : soft [Non-tender] : non-tender [Non-distended] : non-distended [No HSM] : No HSM [No Lesions] : no lesions [No Mass] : no mass [Oriented x3] : oriented x3 [FreeTextEntry6] : No masses, nontender, and no nipple discharge, no skin changes, no adenopathy [Examination Of The Breasts] : a normal appearance [No Masses] : no breast masses were palpable [Labia Majora] : normal [Labia Minora] : normal [Normal] : normal [Tenderness] : nontender [Anteversion] : anteverted [Mass ___ cm] : no uterine mass was palpated [Uterine Adnexae] : normal

## 2022-05-24 NOTE — HISTORY OF PRESENT ILLNESS
[FreeTextEntry1] : Patient is a 41-year-old  2 para 2 last menstrual period May 5, 2022\par Patient presents for annual visit, complaints of heavy periods to the point becoming anemic, and also complaining of white clear discharge from the right breast over the past 6 months

## 2022-05-24 NOTE — PLAN
[FreeTextEntry1] : Patient is a 41-year-old  2 para 2 last menstrual period May 5, 2022\par Patient presents for annual visit, complaining of right breast white clear discharge over the past 6 months, and also heavy periods to the point of becoming anemic over the past few years cycles also slightly irregular\par Physical exam reveals a well-developed well-nourished slightly obese female, heart regular rhythm and rate, breast no masses nontender no skin change or nipple discharge no adenopathy, abdomen soft nontender no organomegaly\par Pelvic exam shows normal female external genitalia, vagina no lesions, cervix appropriate size nontender, uterus anteverted normal size nontender, adnexa no mass nontender.\par Pap smear performed\par Prescription for breast mammogram and sonogram given\par Patient will be sent for a blood count and a prolactin level and instructions to proper blood draw timing we will give the patient and patient states she understands\par Pending mammogram and sonogram results will possibly refer to a breast surgeon for discharge but most likely this is a benign finding.\par Patient reassured\par

## 2022-05-26 LAB — HPV HIGH+LOW RISK DNA PNL CVX: NOT DETECTED

## 2022-05-28 LAB — CYTOLOGY CVX/VAG DOC THIN PREP: NORMAL

## 2022-05-30 PROCEDURE — 84484 ASSAY OF TROPONIN QUANT: CPT

## 2022-05-30 PROCEDURE — 93005 ELECTROCARDIOGRAM TRACING: CPT

## 2022-05-30 PROCEDURE — 85025 COMPLETE CBC W/AUTO DIFF WBC: CPT

## 2022-05-30 PROCEDURE — U0005: CPT

## 2022-05-30 PROCEDURE — 80053 COMPREHEN METABOLIC PANEL: CPT

## 2022-05-30 PROCEDURE — 71045 X-RAY EXAM CHEST 1 VIEW: CPT

## 2022-05-30 PROCEDURE — 83735 ASSAY OF MAGNESIUM: CPT

## 2022-05-30 PROCEDURE — 36415 COLL VENOUS BLD VENIPUNCTURE: CPT

## 2022-05-30 PROCEDURE — 80048 BASIC METABOLIC PNL TOTAL CA: CPT

## 2022-05-30 PROCEDURE — 99285 EMERGENCY DEPT VISIT HI MDM: CPT | Mod: 25

## 2022-05-30 PROCEDURE — U0003: CPT

## 2022-05-30 PROCEDURE — 84702 CHORIONIC GONADOTROPIN TEST: CPT

## 2022-06-02 ENCOUNTER — APPOINTMENT (OUTPATIENT)
Dept: OBGYN | Facility: CLINIC | Age: 42
End: 2022-06-02
Payer: MEDICAID

## 2022-06-02 ENCOUNTER — ASOB RESULT (OUTPATIENT)
Age: 42
End: 2022-06-02

## 2022-06-02 DIAGNOSIS — N92.1 EXCESSIVE AND FREQUENT MENSTRUATION WITH IRREGULAR CYCLE: ICD-10-CM

## 2022-06-02 PROCEDURE — 76856 US EXAM PELVIC COMPLETE: CPT | Mod: 59

## 2022-06-02 PROCEDURE — 76856 US EXAM PELVIC COMPLETE: CPT

## 2022-06-02 PROCEDURE — 76830 TRANSVAGINAL US NON-OB: CPT

## 2022-06-02 PROCEDURE — 99213 OFFICE O/P EST LOW 20 MIN: CPT | Mod: 25

## 2022-06-02 NOTE — PLAN
[FreeTextEntry1] : Patient is a 41-year-old  2 para 2 last menstrual period May 24, 2022\par Patient presents for pelvic sonogram to evaluate and uterus and endometrium,,, patient has history of extremely heavy periods\par Pelvic sono shows uterus to be 7.4 x 5.2 x 3.4, with endometrial thickness 6 mm\par Right ovary 2.7 x 2.8 x 2.3\par Left ovary 2.5 x 1.9 x 2.7\par Essentially normal pelvic anatomy\par Results discussed with patient\par Reassured\par Options discussed including no treatment, birth control pill use, Depo-Provera, NuvaRing, IUD, or surgical options.\par Patient states she desires to have an IUD she had in the past uncomfortable experience with the Depo-Provera\par Mirena IUD ordered\par Patient advised that if the IUD is approved and obtained for her to be have the IUD placed, patient to have Cytotec treatment prior to insertion of the IUD\par Patient that she understands

## 2022-06-10 NOTE — PHYSICAL EXAM
[General Appearance - Well Developed] : well developed [General Appearance - Well Nourished] : well nourished [General Appearance - In No Acute Distress] : no acute distress [Normal Conjunctiva] : the conjunctiva exhibited no abnormalities [Normal Oral Mucosa] : normal oral mucosa [Normal Oropharynx] : normal oropharynx [Normal Jugular Venous V Waves Present] : normal jugular venous V waves present [Respiration, Rhythm And Depth] : normal respiratory rhythm and effort [Heart Rate And Rhythm] : heart rate and rhythm were normal [Edema] : no peripheral edema present [Bowel Sounds] : normal bowel sounds [Abdomen Soft] : soft [Abnormal Walk] : normal gait [Nail Clubbing] : no clubbing of the fingernails [Cyanosis, Localized] : no localized cyanosis [Skin Color & Pigmentation] : normal skin color and pigmentation [Skin Turgor] : normal skin turgor [] : no rash [Oriented To Time, Place, And Person] : oriented to person, place, and time [Impaired Insight] : insight and judgment were intact [FreeTextEntry1] : incision sites (left lateral chest wall and subxiphoid) well healed, no erythema, bleeding or swelling.

## 2022-06-10 NOTE — DISCUSSION/SUMMARY
[FreeTextEntry1] : 40 year old woman with history of obesity, HLD, hypertrophic cardiomyopathy (HCM) with outflow tract obstruction, NSVT and family history of sudden death s/p S-ICD on 4/20/18, recurrent episodes of VT/VF. She has had two ICD shocks for polymorphic VT/VF, and the more recent episode may have been triggered by a short-coupled PVCs. In addition, she has noted symptoms of palpitation consistent with ectopy (PVCs) clustered around times that she has had ICD shocks during sleep. I suspect that she may therefore have a PVC trigger inducing her VF episodes, on top of her vulnerable substrate with HCM. We did discuss PVC ablation in the past. Recently, she has had significantly reduced frequency of PVCs (<1% on recent monitor) and the associated symptoms have improved. Options for antiarrhythmic therapy are limited as she is already bradycardic with Toprol and given concern for QT prolongation (and desire to avoid amiodarone given her young age). PVC ablation was again discussed, however, success may be limited if frequent PVCs are not noted during the procedure (as her symptomatic episodes are erratic). For now will continue monitoring, and reconsider PVC ablation if the PVCs/symptoms recur.\par -continue routine device and EP followup (6 mo or prn)\par -continue toprol\par  -pt to follow-up with Dr. Armas regarding HCM management \par

## 2022-06-10 NOTE — HISTORY OF PRESENT ILLNESS
[FreeTextEntry1] : 40 year old woman with history of obesity, HLD, hypertrophic cardiomyopathy (HCM) with outflow tract obstruction, NSVT and family history of sudden death s/p S-ICD on 18, presenting for follow-up regarding VT/VF.   In 2019 she had an episode of ventricular tachycardia and had appropriate defibrillation from her S-ICD. This occurred during sleep and she was unaware, but it was identified during subsequent device follow-up.    On 20, she had another episode of polymorphic VT/VF resulting in a successful ICD shock. The episode did appear to initiate with a PVC which occurred on top of a Twave. She was sleeping when the shock occurred. Interrogation of the S-ICD today otherwise reveals no other recorded arrhythmias and Battery longevity is 75%.  For the few days around the time of the shock she did not feelings of palpitation c/w “a pause” in the chest and subsequent brief dizziness and nausea. These have occurred in spurts with episodes multiple times per day. She has not had sustained palpitation or syncope.  Given concern for possible PVC-triggered VF, a 3 day MCOT monitor was performed -20 and revealed sinus rhythm with avg HR 64 bpm (range ), rare PVCs (<1%) and NSVT up to 6 beats, and brief runs of SVT  up to 4 beats (one symptom triggered episode c/w with brief SVT).  Since last visit she has been feeling better and has noted the palpitation sensation is much less frequent (now about once per month). She continues to have fatigue and exertional dyspnea.  She has seen Dr. Armas and her outflow gradient has increased- she is now being considered for septal ablation/myomectomy.  Of note last year she had a baby and delivered via elective  on 11/15/19. She tolerated pregnancy and delivery generally well, but did have subsequent vaginal and leg bleeding. She had postpartum depression in 2020 and briefly took Lexapro.  Current medications include Toprol 100mg qd.

## 2022-06-10 NOTE — REVIEW OF SYSTEMS
[Abn Vaginal Bleeding] : unexplained vaginal bleeding [Anxiety] : anxiety [Negative] : Musculoskeletal [Feeling Fatigued] : not feeling fatigued [Shortness Of Breath] : no shortness of breath [Dyspnea on exertion] : not dyspnea during exertion [Chest Pain] : no chest pain [Lower Ext Edema] : no extremity edema [Palpitations] : no palpitations [Skin: A Rash] : no rash: [Skin Lesions] : no skin lesions [Dizziness] : no dizziness [Convulsions] : no convulsions [Confusion] : no confusion was observed [Easy Bleeding] : no tendency for easy bleeding [Easy Bruising] : no tendency for easy bruising

## 2022-06-13 DIAGNOSIS — Z3A.31 31 WEEKS GESTATION OF PREGNANCY: ICD-10-CM

## 2022-06-13 RX ORDER — HYDROCORTISONE 25 MG/G
2.5 CREAM TOPICAL TWICE DAILY
Qty: 1 | Refills: 3 | Status: DISCONTINUED | COMMUNITY
Start: 2021-01-06 | End: 2022-06-13

## 2022-06-15 ENCOUNTER — APPOINTMENT (OUTPATIENT)
Dept: ELECTROPHYSIOLOGY | Facility: CLINIC | Age: 42
End: 2022-06-15
Payer: MEDICAID

## 2022-06-15 VITALS
BODY MASS INDEX: 35.88 KG/M2 | HEART RATE: 71 BPM | SYSTOLIC BLOOD PRESSURE: 114 MMHG | DIASTOLIC BLOOD PRESSURE: 60 MMHG | WEIGHT: 178 LBS | HEIGHT: 59 IN

## 2022-06-15 PROCEDURE — 93000 ELECTROCARDIOGRAM COMPLETE: CPT

## 2022-06-15 PROCEDURE — 99215 OFFICE O/P EST HI 40 MIN: CPT

## 2022-06-15 NOTE — PHYSICAL EXAM
[General Appearance - Well Developed] : well developed [General Appearance - Well Nourished] : well nourished [General Appearance - In No Acute Distress] : no acute distress [Normal Conjunctiva] : the conjunctiva exhibited no abnormalities [Normal Oral Mucosa] : normal oral mucosa [Normal Oropharynx] : normal oropharynx [Normal Jugular Venous V Waves Present] : normal jugular venous V waves present [] : no respiratory distress [Heart Rate And Rhythm] : heart rate and rhythm were normal [Respiration, Rhythm And Depth] : normal respiratory rhythm and effort [Edema] : no peripheral edema present [Abdomen Soft] : soft [Abnormal Walk] : normal gait [Nail Clubbing] : no clubbing of the fingernails [Cyanosis, Localized] : no localized cyanosis [FreeTextEntry1] : incision sites (left lateral chest wall and subxiphoid) well healed, no erythema, bleeding or swelling.  [Impaired Insight] : insight and judgment were intact [Oriented To Time, Place, And Person] : oriented to person, place, and time

## 2022-06-15 NOTE — DISCUSSION/SUMMARY
[FreeTextEntry1] : 41 year old woman with history of obesity, HLD, hypertrophic cardiomyopathy (HCM) with outflow tract obstruction, NSVT and family history of sudden death s/p S-ICD on 4/20/18, presenting for follow-up regarding VT/VF and ICD shocks. She has had recurrent ICD shocks due to polymorphic VT/VF. Some of these had been apparently PVC-initiated, but workup has noted a low burden of PVCs. Recently she has been started on sotalol, and is doing well with this, and has not had recurrent arrhythmia events since starting it. A nuclear stress test was suggestive of ischemia, and given this and her recurrent VT/VF, I agree with evaluation with cardiac catheterization. In addition, evaluation for etoh septal ablation could also be considered for her outflow tract gradient- she did say she is not ready to consider cardiac surgery for myoectopy.  \par \par -continue current meds including sotalol 80mg bid and metoprolol as tolerated \par \par -plan for cardiac cath as above \par \par -advised not to drive until has been free of VT/VF and ICD shocks for 6 months \par \par -ICD and EP followup in 4 months

## 2022-06-15 NOTE — HISTORY OF PRESENT ILLNESS
[FreeTextEntry1] : 41 year old woman with history of obesity, HLD, hypertrophic cardiomyopathy (HCM) with outflow tract obstruction, NSVT and family history of sudden death s/p S-ICD on 18, presenting for follow-up regarding VT/VF and ICD shocks. \par \par In 2019 she had an episode of ventricular tachycardia and had appropriate defibrillation from her S-ICD. This occurred during sleep and she was unaware, but it was identified during subsequent device follow-up. \par \par   On 20, she had another episode of polymorphic VT/VF resulting in a successful ICD shock. The episode did appear to initiate with a PVC which occurred on top of a T-wave. She was sleeping when the shock occurred. Interrogation of the S-ICD today otherwise reveals no other recorded arrhythmias and Battery longevity is 75%. For the few days around the time of the shock she did not feelings of palpitation c/w “a pause” in the chest and subsequent brief dizziness and nausea. These have occurred in spurts with episodes multiple times per day. She has not had sustained palpitation or syncope. \par \par Given concern for possible PVC-triggered VF, a 3 day MCOT monitor was performed -20 and revealed sinus rhythm with avg HR 64 bpm (range ), rare PVCs (<1%) and NSVT up to 6 beats, and brief runs of SVT  up to 4 beats (one symptom triggered episode c/w with brief SVT). \par \par In 2022 she again had polymorphic VT (2 episodes in a month period). She was started on sotalol. She had had QT prolongation with 120mg bid, and this was changed to 80mg bid. Metoprolol was lowered from 100mg qd to 25mg bid.  \par \par Since that time she has been feeling well. She notes only feeling less chest heaviness, and no sustained palpitation. She does still have brief mild palpitation which has been improving.  \par \par ECG today reveals sinus rhythm 71 bpm with her usual repolarization abnormality, and QTc 443 ms.  \par \par She did have a nuclear stress test concerning for anterolateral ischemia, and a catheterization is planned at [facility]. \par \par She has continued to have heavy menses. Of note last year she had a baby and delivered via elective  on 11/15/19. She tolerated pregnancy and delivery generally well, but did have subsequent vaginal and leg bleeding. She had postpartum depression in 2020 and briefly took Lexapro. and there has been discussion regarding hysterectomy, but her OBGYN has recommended she start Mirena IUD instead. \par \par She has seen Dr. Armas and her outflow gradient has increased- she has also been considered for septal ablation/myomectomy. \par \par Current medications include metoprolol 25mg bid mg qd., sotalol 80mg bid \par \par

## 2022-06-15 NOTE — REVIEW OF SYSTEMS
[Fever] : no fever [Chills] : no chills [Feeling Fatigued] : feeling fatigued [SOB] : no shortness of breath [Dyspnea on exertion] : not dyspnea during exertion [Chest Discomfort] : no chest discomfort [Lower Ext Edema] : no extremity edema [Leg Claudication] : no intermittent leg claudication [Palpitations] : palpitations [Syncope] : no syncope [Abn Vaginal Bleeding] : unexplained vaginal bleeding [Dizziness] : no dizziness [Convulsions] : no convulsions [Negative] : Psychiatric

## 2022-07-14 ENCOUNTER — APPOINTMENT (OUTPATIENT)
Dept: GASTROENTEROLOGY | Facility: CLINIC | Age: 42
End: 2022-07-14

## 2022-08-22 DIAGNOSIS — O36.5930 MATERNAL CARE FOR OTHER KNOWN OR SUSPECTED POOR FETAL GROWTH, THIRD TRIMESTER, NOT APPLICABLE OR UNSPECIFIED: ICD-10-CM

## 2022-08-22 DIAGNOSIS — O09.899 OTHER ABNORMAL FINDINGS ON ANTENATAL SCREENING OF MOTHER: ICD-10-CM

## 2022-08-22 DIAGNOSIS — O35.9XX0 MATERNAL CARE FOR (SUSPECTED) FETAL ABNORMALITY AND DAMAGE, UNSPECIFIED, NOT APPLICABLE OR UNSPECIFIED: ICD-10-CM

## 2022-08-22 DIAGNOSIS — O28.8 OTHER ABNORMAL FINDINGS ON ANTENATAL SCREENING OF MOTHER: ICD-10-CM

## 2022-08-22 DIAGNOSIS — Z87.59 PERSONAL HISTORY OF OTHER COMPLICATIONS OF PREGNANCY, CHILDBIRTH AND THE PUERPERIUM: ICD-10-CM

## 2022-08-23 ENCOUNTER — APPOINTMENT (OUTPATIENT)
Dept: OBGYN | Facility: CLINIC | Age: 42
End: 2022-08-23

## 2022-10-06 ENCOUNTER — APPOINTMENT (OUTPATIENT)
Dept: OBGYN | Facility: CLINIC | Age: 42
End: 2022-10-06

## 2022-10-06 VITALS
WEIGHT: 176 LBS | DIASTOLIC BLOOD PRESSURE: 60 MMHG | SYSTOLIC BLOOD PRESSURE: 115 MMHG | BODY MASS INDEX: 35.55 KG/M2 | HEART RATE: 71 BPM

## 2022-10-06 DIAGNOSIS — N76.0 ACUTE VAGINITIS: ICD-10-CM

## 2022-10-06 PROCEDURE — 99213 OFFICE O/P EST LOW 20 MIN: CPT

## 2022-10-06 NOTE — HISTORY OF PRESENT ILLNESS
[FreeTextEntry1] : Patient is a 42-year-old  2 para 2 last menstrual period was 2022,,, just some spotting for couple days\par Patient presents for evaluation for vaginal yeast infection that seems not respond to over-the-counter Monistat cream

## 2022-10-06 NOTE — PHYSICAL EXAM
[Labia Majora] : normal [Labia Minora] : normal [Discharge] : a  ~M vaginal discharge was present [Moderate] : moderate [White] : white [Thick] : thick [Normal] : normal [Anteversion] : anteverted [Uterine Adnexae] : normal [Tenderness] : nontender [Mass ___ cm] : no uterine mass was palpated [FreeTextEntry5] : No cervical motion tenderness

## 2022-10-06 NOTE — PLAN
[FreeTextEntry1] : Patient is a 42-year-old  2 para 2 last menstrual period ,,, spotting for couple days\par Patient presents for evaluation of vaginal infection that has not responded to over-the-counter Monistat cream\par Physical exam reveals a well-developed well-nourished female slightly obese,,, BMI 35\par Pelvic exam shows normal female external genitalia, vagina no lesions thick white discharge, cervix appropriate size nontender, uterus anteverted normal size nontender, adnexa no mass nontender.\par Vaginal culture performed\par Prescription for Diflucan sent to the pharmacy\par Patient desires to initiate treatment with Depo-Provera for heavy cycles\par Prescription sent to pharmacy\par Patient to return for first injection

## 2022-10-17 LAB
A VAGINAE DNA VAG QL NAA+PROBE: NORMAL
BVAB2 DNA VAG QL NAA+PROBE: NORMAL
C KRUSEI DNA VAG QL NAA+PROBE: NEGATIVE
C KRUSEI DNA VAG QL NAA+PROBE: NEGATIVE
C KRUSEI DNA VAG QL NAA+PROBE: POSITIVE
C KRUSEI DNA VAG QL NAA+PROBE: POSITIVE
C TRACH RRNA SPEC QL NAA+PROBE: NEGATIVE
MEGA1 DNA VAG QL NAA+PROBE: NORMAL
N GONORRHOEA RRNA SPEC QL NAA+PROBE: NEGATIVE
T VAGINALIS RRNA SPEC QL NAA+PROBE: NEGATIVE

## 2022-10-27 ENCOUNTER — NON-APPOINTMENT (OUTPATIENT)
Age: 42
End: 2022-10-27

## 2022-10-27 ENCOUNTER — APPOINTMENT (OUTPATIENT)
Dept: CARDIOLOGY | Facility: CLINIC | Age: 42
End: 2022-10-27

## 2022-10-27 VITALS
SYSTOLIC BLOOD PRESSURE: 96 MMHG | BODY MASS INDEX: 35.08 KG/M2 | HEIGHT: 59 IN | HEART RATE: 68 BPM | TEMPERATURE: 98 F | WEIGHT: 174 LBS | DIASTOLIC BLOOD PRESSURE: 56 MMHG | OXYGEN SATURATION: 95 %

## 2022-10-27 DIAGNOSIS — H66.90 OTITIS MEDIA, UNSPECIFIED, UNSPECIFIED EAR: ICD-10-CM

## 2022-10-27 DIAGNOSIS — I42.2 OTHER HYPERTROPHIC CARDIOMYOPATHY: ICD-10-CM

## 2022-10-27 DIAGNOSIS — I47.20 VENTRICULAR TACHYCARDIA, UNSPECIFIED: ICD-10-CM

## 2022-10-27 PROCEDURE — 99205 OFFICE O/P NEW HI 60 MIN: CPT | Mod: 25

## 2022-10-27 PROCEDURE — 93000 ELECTROCARDIOGRAM COMPLETE: CPT

## 2022-10-27 RX ORDER — FUROSEMIDE 20 MG/1
20 TABLET ORAL
Qty: 30 | Refills: 1 | Status: DISCONTINUED | COMMUNITY
Start: 2020-06-30 | End: 2022-10-27

## 2022-10-27 RX ORDER — PRENATAL VIT NO.130/IRON/FOLIC 27MG-0.8MG
TABLET ORAL DAILY
Refills: 0 | Status: DISCONTINUED | COMMUNITY
End: 2022-10-27

## 2022-10-27 RX ORDER — FUROSEMIDE 20 MG/1
20 TABLET ORAL
Qty: 30 | Refills: 1 | Status: DISCONTINUED | COMMUNITY
Start: 2020-09-30 | End: 2022-10-27

## 2022-10-27 RX ORDER — PANTOPRAZOLE 40 MG/1
40 TABLET, DELAYED RELEASE ORAL DAILY
Qty: 90 | Refills: 0 | Status: DISCONTINUED | COMMUNITY
Start: 2020-01-29 | End: 2022-10-27

## 2022-10-27 RX ORDER — FLUCONAZOLE 150 MG/1
150 TABLET ORAL
Qty: 1 | Refills: 2 | Status: DISCONTINUED | COMMUNITY
Start: 2022-10-06 | End: 2022-10-27

## 2022-10-27 RX ORDER — SOTALOL HYDROCHLORIDE 80 MG/1
80 TABLET ORAL
Qty: 180 | Refills: 0 | Status: ACTIVE | COMMUNITY
Start: 2022-10-27

## 2022-10-27 RX ORDER — MISOPROSTOL 200 UG/1
200 TABLET ORAL
Qty: 2 | Refills: 0 | Status: DISCONTINUED | COMMUNITY
Start: 2022-08-19 | End: 2022-10-27

## 2022-10-27 RX ORDER — CHLORHEXIDINE GLUCONATE 4 %
325 (65 FE) LIQUID (ML) TOPICAL
Qty: 60 | Refills: 4 | Status: DISCONTINUED | COMMUNITY
Start: 2020-06-30 | End: 2022-10-27

## 2022-10-27 RX ORDER — SIMVASTATIN 20 MG/1
20 TABLET, FILM COATED ORAL
Qty: 90 | Refills: 2 | Status: DISCONTINUED | COMMUNITY
Start: 2020-06-30 | End: 2022-10-27

## 2022-10-30 PROBLEM — I47.20 VENTRICULAR TACHYCARDIA: Status: ACTIVE | Noted: 2019-09-25

## 2022-10-30 PROBLEM — I42.2 HYPERTROPHIC CARDIOMYOPATHY: Status: ACTIVE | Noted: 2017-12-27

## 2022-11-16 ENCOUNTER — APPOINTMENT (OUTPATIENT)
Dept: ELECTROPHYSIOLOGY | Facility: CLINIC | Age: 42
End: 2022-11-16

## 2022-11-16 VITALS
HEIGHT: 59 IN | WEIGHT: 175 LBS | BODY MASS INDEX: 35.28 KG/M2 | SYSTOLIC BLOOD PRESSURE: 124 MMHG | DIASTOLIC BLOOD PRESSURE: 80 MMHG | HEART RATE: 54 BPM

## 2022-11-16 PROCEDURE — 93000 ELECTROCARDIOGRAM COMPLETE: CPT

## 2022-11-16 PROCEDURE — 99215 OFFICE O/P EST HI 40 MIN: CPT | Mod: 25

## 2022-11-16 RX ORDER — AMOXICILLIN AND CLAVULANATE POTASSIUM 875; 125 MG/1; MG/1
875-125 TABLET, COATED ORAL
Qty: 14 | Refills: 0 | Status: DISCONTINUED | COMMUNITY
Start: 2022-09-22

## 2022-11-16 NOTE — HISTORY OF PRESENT ILLNESS
[FreeTextEntry1] : 42 year old woman with history of obesity, HLD, hypertrophic cardiomyopathy (HCM) with outflow tract obstruction, NSVT and family history of sudden death s/p S-ICD on 4/20/18, presenting for follow-up of VT/VF and ICD shocks. \par \par In 4/2019 she had an episode of ventricular tachycardia and had appropriate defibrillation from her S-ICD. This occurred during sleep and she was unaware, but it was identified during subsequent device follow-up. \par \par On 6/22/20, she had another episode of polymorphic VT/VF while sleeping, which resulted in a successful ICD shock. The episode did appear to initiate with a PVC which occurred on top of a T-wave. She was sleeping when the shock occurred. \par \par Given concern for possible PVC-triggered VF, a 3 day MCOT monitor was performed 7/23-7/26/20 and revealed sinus rhythm with avg HR 64 bpm (range ), rare PVCs (<1%) and NSVT up to 6 beats, and brief runs of SVT up to 4 beats (one symptom triggered episode c/w with brief SVT). \par \par In 5/2022 she again had polymorphic VT (2 episodes in a month period). She was started on sotalol. She had QT prolongation with 120mg bid, and this was changed to 80mg bid. Metoprolol was lowered from 100mg daily to 25mg bid. \par \par Since that time she has been feeling well. She notes only brief mild palpitation and less chest heaviness, and no sustained palpitation. \par \par ECG today reveals sinus rhythm 71 bpm with her usual repolarization abnormality, and QTc 443 ms. \par \par  She did have a nuclear stress test concerning for anterolateral ischemia, and a catheterization will be performed later this month at [facility].  \par \par Her outflow gradient has increased- she has also been considered for septal ablation/myomectomy. \par \par   \par \par Current medications include metoprolol succinate 25mg BID, sotalol 80mg BID, Lasix, Lexapro, fluticasone, omeprazole, simvastatin

## 2022-11-16 NOTE — PHYSICAL EXAM
[General Appearance - Well Developed] : well developed [General Appearance - Well Nourished] : well nourished [General Appearance - In No Acute Distress] : no acute distress [Normal Conjunctiva] : the conjunctiva exhibited no abnormalities [Normal Oral Mucosa] : normal oral mucosa [Normal Oropharynx] : normal oropharynx [Normal Jugular Venous V Waves Present] : normal jugular venous V waves present [] : no respiratory distress [Respiration, Rhythm And Depth] : normal respiratory rhythm and effort [Auscultation Breath Sounds / Voice Sounds] : lungs were clear to auscultation bilaterally [Heart Rate And Rhythm] : heart rate and rhythm were normal [Edema] : no peripheral edema present [Abdomen Soft] : soft [Abnormal Walk] : normal gait [Nail Clubbing] : no clubbing of the fingernails [Cyanosis, Localized] : no localized cyanosis [Oriented To Time, Place, And Person] : oriented to person, place, and time [Impaired Insight] : insight and judgment were intact [FreeTextEntry1] : incision sites (left lateral chest wall and subxiphoid) well healed, no erythema, bleeding or swelling.

## 2022-11-16 NOTE — ADDENDUM
[FreeTextEntry1] : pt doing well on sotalol, with no recurrent VT on this medication. also on beta blockade, and has sinus bradycardia. can consider lowering dose of metoprolol if needed in the future. for now plan to proceed with LHC at [facility].

## 2022-11-16 NOTE — REVIEW OF SYSTEMS
[Abn Vaginal Bleeding] : unexplained vaginal bleeding [Negative] : Psychiatric [Fever] : no fever [Chills] : no chills [Feeling Fatigued] : not feeling fatigued [SOB] : no shortness of breath [Dyspnea on exertion] : not dyspnea during exertion [Chest Discomfort] : no chest discomfort [Lower Ext Edema] : no extremity edema [Leg Claudication] : no intermittent leg claudication [Palpitations] : no palpitations [Syncope] : no syncope [Dizziness] : no dizziness [Convulsions] : no convulsions

## 2022-11-16 NOTE — DISCUSSION/SUMMARY
[EKG obtained to assist in diagnosis and management of assessed problem(s)] : EKG obtained to assist in diagnosis and management of assessed problem(s) [FreeTextEntry1] : 42 year old woman with history of obesity, HLD, hypertrophic cardiomyopathy (HCM) with outflow tract obstruction, NSVT and family history of sudden death s/p S-ICD on 4/20/18, presenting for follow-up regarding VT/VF and ICD shocks. She had recurrent ICD shocks due to polymorphic VT/VF. Some of these had been apparently PVC-initiated, but workup has noted a low burden of PVCs. Recently she has been started on sotalol, and is doing well with this, and has not had recurrent arrhythmia events. A nuclear stress test was suggestive of ischemia, and given this and her recurrent VT/VF, she will undergo cardiac catheterization later this month. In addition, evaluation for etoh septal ablation could also be considered for her outflow tract gradient- although she is not currently  prepared to consider cardiac surgery for myoectomy.  \par \par -continue current meds including sotalol 80mg bid and metoprolol as tolerated  \par -plan for cardiac cath with Dr. Burgess as scheduled \par - No further driving restrictions as she has now been free of VT/VF and ICD shocks for 6 months. However, she is advised to remain vigilant for recurrent s/s of recurrent arrhythmia. \par -ICD and EP followup in 4 months.

## 2022-11-16 NOTE — CARDIOLOGY SUMMARY
[___] : [unfilled] [de-identified] : 11/16/22: sinus rhythm 54bpm, LVH  and diffuse ST/Tw abnormalities (unchanged). QTc 452 ms\par 6/15/22 sinus rhythm 71 bpm, LVH and diffuse ST/Tw abnormalities (unchanged). QTc 443 ms [de-identified] : 6/9/22 mild-mod abnormal, anterolateral ischemia, with infarct of anterolateral wall [de-identified] : TTE 3/15/22 LVEF 70%, LA 3.9cm, mod septal hypertrophy and HCM with outflow obstruction, resting gradient 73mmHg, 130mmHg post valsalva, mild MR, mild TR, RVSP <35

## 2022-11-25 ENCOUNTER — NON-APPOINTMENT (OUTPATIENT)
Age: 42
End: 2022-11-25

## 2022-11-25 RX ORDER — CHLORHEXIDINE GLUCONATE 213 G/1000ML
1 SOLUTION TOPICAL ONCE
Refills: 0 | Status: DISCONTINUED | OUTPATIENT
Start: 2022-11-28 | End: 2022-12-12

## 2022-11-25 NOTE — H&P PST ADULT - ASSESSMENT
Risk Assessments:  ASA:  Mallampati:  GFR:   Cr:  BRA:      Impression:    Plan:    -plan for *** via ***  -patient seen and examined  -confirmed appropriate NPO duration  -ECG and Labs reviewed  -Aspirin 81mg po pre-cath  -Normal Saline 0.9%  250ml/hr IV: pre procedure GAL ppx   -procedure discussed with patient; risks and benefits explained, questions answered  -consent obtained by attending  Risk Assessments:  ASA: 3  Mallampati: 2  GFR:   Cr:  BRA:      Impression:    Plan:    -plan for *** via ***  -patient seen and examined  -confirmed appropriate NPO duration  -ECG and Labs reviewed  -Aspirin 81mg po pre-cath  -Normal Saline 0.9%  250ml/hr IV: pre procedure GAL ppx   -procedure discussed with patient; risks and benefits explained, questions answered  -consent obtained by attending  Risk Assessments:  ASA: 3  Mallampati: 2  GFR: 118  Cr: 0.55  BRA: 1.5%      Impression: 41 yo female with F/H of SCD and diagnosed with HOCM via genetic testing, s/p ICD had appropriate ICD firing in May and then abnormal NST suggesting anterior ischemia, now here for LHC and evaluation of LV cavity gradient.    Plan:    -plan for LHC via [facility]  -patient seen and examined  -confirmed appropriate NPO duration  -ECG and Labs reviewed  -Aspirin 81mg po pre-cath  -Normal Saline 0.9%  250ml/hr IV: pre procedure GAL ppx   -procedure discussed with patient; risks and benefits explained, questions answered  -consent obtained by attending

## 2022-11-25 NOTE — H&P PST ADULT - HISTORY OF PRESENT ILLNESS
HPI: This is a 42 year old female with PMH of HOCM diagnosed via genetic testing, family h/o SCD, s/p ICD 2/2016 with Dr. Lynn presented to office for follow up after chest pain/BETANCOURT and ICD fire a few months ago. Due to her symptoms she was referred for C to eval for possible CAD.     Symptoms:        Angina (Class):        Ischemic Symptoms:     Heart Failure:        Systolic/Diastolic/Combined:        NYHA Class (within 2 weeks):     Assessment of LVEF (Must be within 6 months):       EF:        Assessed by:        Date:     Prior Cardiac Interventions:       PCI's (Date, Stents, Vessels):        CABG (Date, Grafts):     Noninvasive Testing:   Stress Test: Date:        Protocol:        Duration of Exercise:        Symptoms:        EKG Changes:        DTS:        Myocardial Imaging:        Risk Assessment (Low, Medium, High):     Echo (Date, Findings):     Antianginal Therapies:        Beta Blockers:  metoprolol succ 25mg daily       Calcium Channel Blockers:        Long Acting Nitrates:        Ranexa:       Associated Risk Factors:        Fraility Assessment: N/A (mild, moderate, severe)       Cerebrovascular Disease: N/A       Chronic Lung Disease: N/A       Peripheral Arterial Disease: N/A       Chronic Kidney Disease (if yes, what is GFR): N/A       Uncontrolled Diabetes (if yes, what is HgbA1C or FBS): N/A       Poorly Controlled Hypertension (if yes, what is SBP): N/A       Morbid Obesity (if yes, what is BMI): N/A       History of Recent Ventricular Arrhythmia: N/A       Inability to Ambulate Safely: N/A       Need for Therapeutic Anticoagulation: N/A       Antiplatelet or Contrast Allergy: N/A    Social History:        Marital:         Tobacco:        ETOH:        Caffeine:     ROS:  CONSTITUTIONAL: No weakness, fevers or chills  EYES/ENT: No visual changes;  No vertigo or throat pain   NECK: No pain or stiffness  RESPIRATORY: No cough, wheezing, hemoptysis; No shortness of breath  CARDIOVASCULAR: No chest pain or palpitations  GASTROINTESTINAL: No abdominal or epigastric pain. No nausea, vomiting, or hematemesis; No diarrhea or constipation. No melena or hematochezia.  GENITOURINARY: No dysuria, frequency or hematuria  NEUROLOGICAL: No numbness or weakness  SKIN: No itching, burning, rashes, or lesions   All other review of systems is negative unless indicated above    PHYSICAL EXAM:    Vital Signs Last 24 Hrs  T(C): --  T(F): --  HR: --  BP: --  BP(mean): --  RR: --  SpO2: --        GENERAL: Pt lying comfortably, NAD.  ENMT: PERRL, +EOMI.  NECK: soft, Supple, No JVD,   CHEST/LUNG: Clear to auscultatation bilaterally; No wheezing.  HEART: S1S2+, Regular rate and rhythm; No murmurs.  ABDOMEN: Soft, Nontender, Nondistended; Bowel sounds present.  MUSCULOSKELETAL: Normal range of motion.  SKIN: No rashes or lesions.  NEURO: AAOX3, no focal deficits, no motor r sensory loss.  PSYCH: normal mood.  VASCULAR:   Radial +2 R/+2 L  Femoral +2 R/+2 L  PT +2 R/+2 L  DP +2 R/+2 L    EKG:      HPI: This is a 42 year old female with PMH of HOCM diagnosed via genetic testing, family h/o SCD, s/p ICD 2/2016 with Dr. Lynn presented to office for follow up after chest pain/BETANCOURT and ICD fire a few months ago. Due to her symptoms she was referred for LHC to eval for possible CAD as well as cavitary gradient measurements     Symptoms:        Angina (Class):        Ischemic Symptoms:     Heart Failure:        Systolic/Diastolic/Combined:        NYHA Class (within 2 weeks):     Assessment of LVEF (Must be within 6 months):       EF: recent echo 9/2022 pt to bring CD from Dr. Zabala       Assessed by:        Date:     Prior Cardiac Interventions:       PCI's (Date, Stents, Vessels):        CABG (Date, Grafts):     Noninvasive Testing:   Stress Test: Date:        Protocol:        Duration of Exercise:        Symptoms:        EKG Changes:        DTS:        Myocardial Imaging:        Risk Assessment (Low, Medium, High):     Echo (Date, Findings):     Antianginal Therapies:        Beta Blockers:  metoprolol succ 25mg daily       Calcium Channel Blockers:        Long Acting Nitrates:        Ranexa:       Associated Risk Factors:        Fraility Assessment: N/A (mild, moderate, severe)       Cerebrovascular Disease: N/A       Chronic Lung Disease: N/A       Peripheral Arterial Disease: N/A       Chronic Kidney Disease (if yes, what is GFR): N/A       Uncontrolled Diabetes (if yes, what is HgbA1C or FBS): N/A       Poorly Controlled Hypertension (if yes, what is SBP): N/A       Morbid Obesity (if yes, what is BMI): N/A       History of Recent Ventricular Arrhythmia: N/A       Inability to Ambulate Safely: N/A       Need for Therapeutic Anticoagulation: N/A       Antiplatelet or Contrast Allergy: N/A    Social History:        Marital:         Tobacco:        ETOH:        Caffeine:     ROS:  CONSTITUTIONAL: No weakness, fevers or chills  EYES/ENT: No visual changes;  No vertigo or throat pain   NECK: No pain or stiffness  RESPIRATORY: No cough, wheezing, hemoptysis; No shortness of breath  CARDIOVASCULAR: No chest pain or palpitations  GASTROINTESTINAL: No abdominal or epigastric pain. No nausea, vomiting, or hematemesis; No diarrhea or constipation. No melena or hematochezia.  GENITOURINARY: No dysuria, frequency or hematuria  NEUROLOGICAL: No numbness or weakness  SKIN: No itching, burning, rashes, or lesions   All other review of systems is negative unless indicated above    PHYSICAL EXAM:    Vital Signs Last 24 Hrs  T(C): --  T(F): --  HR: --  BP: --  BP(mean): --  RR: --  SpO2: --        GENERAL: Pt lying comfortably, NAD.  ENMT: PERRL, +EOMI.  NECK: soft, Supple, No JVD,   CHEST/LUNG: Clear to auscultatation bilaterally; No wheezing.  HEART: S1S2+, Regular rate and rhythm; No murmurs.  ABDOMEN: Soft, Nontender, Nondistended; Bowel sounds present.  MUSCULOSKELETAL: Normal range of motion.  SKIN: No rashes or lesions.  NEURO: AAOX3, no focal deficits, no motor r sensory loss.  PSYCH: normal mood.  VASCULAR:   Radial +2 R/+2 L  Femoral +2 R/+2 L  PT +2 R/+2 L  DP +2 R/+2 L    EKG:      HPI: This is a 42 year old female with PMH of HOCM diagnosed via genetic testing, family h/o SCD, s/p ICD 2/2016 with Dr. Lynn presented to office for follow up after chest pain/BETANCOURT and ICD fire a few months ago. Due to her symptoms she was referred for LHC to eval for possible CAD as well as cavitary gradient measurements     Symptoms:        Angina (Class):        Ischemic Symptoms:     Heart Failure:        Systolic/Diastolic/Combined:        NYHA Class (within 2 weeks):     Assessment of LVEF (Must be within 6 months):       EF: recent echo 9/2022 pt to bring CD from Dr. Zabala       Assessed by:        Date:     Prior Cardiac Interventions:       PCI's (Date, Stents, Vessels):        CABG (Date, Grafts):     Noninvasive Testing:   Stress Test: Date:        Protocol:        Duration of Exercise:        Symptoms:        EKG Changes:        DTS:        Myocardial Imaging:        Risk Assessment (Low, Medium, High):     Echo (Date, Findings): < from: TTE Echo Complete w/Doppler (11.01.19 @ 15:15) >  PHYSICIAN INTERPRETATION:  Left Ventricle: The left ventricular internal cavity size is normal.   There is moderate asymmetric left ventricular hypertrophy involving the   septal wall. The LVH involves septal walls.  Global LV systolic function was normal. Left ventricular ejection   fraction, by visual estimation, is 60 to 65%. The left ventricular   diastolic function could not be assessed in this study.  Right Ventricle: The right ventricular size is normal. RV systolic   function is normal.  Left Atrium: Normal left atrial size.  Right Atrium: Normal right atrial size.  Pericardium: There is no evidence of pericardial effusion.  Mitral Valve: The mitral valve is normal in structure. No evidence of   mitral stenosis. Moderate to severe mitral valve regurgitation is seen.   There is ANGE noted with posterior directed MR.  Tricuspid Valve: The tricuspid valve is not well seen. Adequate TR   velocity was not obtained to accurately assess RVSP.  Aortic Valve: The aortic valve was not well visualized.  Pulmonic Valve: The pulmonic valve was not well visualized.  Aorta: The aortic root is normal in size and structure.       Summary:   1. Technically limited study.   2. Left ventricular ejection fraction, by visual estimation, is 60 to   65%.   3. Normal global left ventricular systolic function.   4. The left ventricular diastolic function could not be assessed in this   study.   5. Normal left ventricular internal cavity size.   6. There is moderate asymmetric leftventricular hypertrophy.   7. Normal left atrial size.   8. There is ANGE noted with moderate to severe posteriorly directed MR.   9. There is no evidence of pericardial effusion.          Antianginal Therapies:        Beta Blockers:  metoprolol succ 25mg daily       Calcium Channel Blockers:        Long Acting Nitrates:        Ranexa:       Associated Risk Factors:        Fraility Assessment: N/A (mild, moderate, severe)       Cerebrovascular Disease: N/A       Chronic Lung Disease: N/A       Peripheral Arterial Disease: N/A       Chronic Kidney Disease (if yes, what is GFR): N/A       Uncontrolled Diabetes (if yes, what is HgbA1C or FBS): N/A       Poorly Controlled Hypertension (if yes, what is SBP): N/A       Morbid Obesity (if yes, what is BMI): N/A       History of Recent Ventricular Arrhythmia: N/A       Inability to Ambulate Safely: N/A       Need for Therapeutic Anticoagulation: N/A       Antiplatelet or Contrast Allergy: N/A    Social History:        Marital:         Tobacco:        ETOH:        Caffeine:     ROS:  CONSTITUTIONAL: No weakness, fevers or chills  EYES/ENT: No visual changes;  No vertigo or throat pain   NECK: No pain or stiffness  RESPIRATORY: No cough, wheezing, hemoptysis; No shortness of breath  CARDIOVASCULAR: No chest pain or palpitations  GASTROINTESTINAL: No abdominal or epigastric pain. No nausea, vomiting, or hematemesis; No diarrhea or constipation. No melena or hematochezia.  GENITOURINARY: No dysuria, frequency or hematuria  NEUROLOGICAL: No numbness or weakness  SKIN: No itching, burning, rashes, or lesions   All other review of systems is negative unless indicated above    PHYSICAL EXAM:    Vital Signs Last 24 Hrs  T(C): --  T(F): --  HR: --  BP: --  BP(mean): --  RR: --  SpO2: --        GENERAL: Pt lying comfortably, NAD.  ENMT: PERRL, +EOMI.  NECK: soft, Supple, No JVD,   CHEST/LUNG: Clear to auscultatation bilaterally; No wheezing.  HEART: S1S2+, Regular rate and rhythm; No murmurs.  ABDOMEN: Soft, Nontender, Nondistended; Bowel sounds present.  MUSCULOSKELETAL: Normal range of motion.  SKIN: No rashes or lesions.  NEURO: AAOX3, no focal deficits, no motor r sensory loss.  PSYCH: normal mood.  VASCULAR:   Radial +2 R/+2 L  Femoral +2 R/+2 L  PT +2 R/+2 L  DP +2 R/+2 L    EKG:      HPI: This is a 42 year old female with PMH of HOCM diagnosed via genetic testing, family h/o SCD, s/p ICD 2/2016 with Dr. Lynn presented to office for follow up after chest pain/BETANCOURT and ICD fire a few months ago. Due to her symptoms she was referred for C to eval for possible CAD as well as cavitary gradient measurements     Symptoms:        Angina (Class):        Ischemic Symptoms:     Heart Failure:        Systolic/Diastolic/Combined: Diastolic       NYHA Class (within 2 weeks):     Assessment of LVEF (Must be within 6 months):       EF: EF 70%       Assessed by: TTE       Date: 9/2022    Prior Cardiac Interventions:       PCI's (Date, Stents, Vessels):        CABG (Date, Grafts):     Noninvasive Testing:   Stress Test: Date: 6/9/22       Protocol: lexiscan        Duration of Exercise:        Symptoms:        EKG Changes:        DTS:        Myocardial Imaging: anterior and lateral wall ischemia       Risk Assessment (Low, Medium, High): medium    Echo (Date, Findings): 9/2022   LVEF 70%  LV c/w HOCM, LA enlargement      TTE Echo Complete w/Doppler (11.01.19 @ 15:15) >  PHYSICIAN INTERPRETATION:  Left Ventricle: The left ventricular internal cavity size is normal.   There is moderate asymmetric left ventricular hypertrophy involving the   septal wall. The LVH involves septal walls.  Global LV systolic function was normal. Left ventricular ejection   fraction, by visual estimation, is 60 to 65%. The left ventricular   diastolic function could not be assessed in this study.  Right Ventricle: The right ventricular size is normal. RV systolic   function is normal.  Left Atrium: Normal left atrial size.  Right Atrium: Normal right atrial size.  Pericardium: There is no evidence of pericardial effusion.  Mitral Valve: The mitral valve is normal in structure. No evidence of   mitral stenosis. Moderate to severe mitral valve regurgitation is seen.   There is ANGE noted with posterior directed MR.  Tricuspid Valve: The tricuspid valve is not well seen. Adequate TR   velocity was not obtained to accurately assess RVSP.  Aortic Valve: The aortic valve was not well visualized.  Pulmonic Valve: The pulmonic valve was not well visualized.  Aorta: The aortic root is normal in size and structure.       Summary:   1. Technically limited study.   2. Left ventricular ejection fraction, by visual estimation, is 60 to   65%.   3. Normal global left ventricular systolic function.   4. The left ventricular diastolic function could not be assessed in this   study.   5. Normal left ventricular internal cavity size.   6. There is moderate asymmetric leftventricular hypertrophy.   7. Normal left atrial size.   8. There is ANGE noted with moderate to severe posteriorly directed MR.   9. There is no evidence of pericardial effusion.          Antianginal Therapies:        Beta Blockers:  metoprolol succ 25mg daily       Calcium Channel Blockers:        Long Acting Nitrates:        Ranexa:       Associated Risk Factors:        Fraility Assessment: N/A (mild, moderate, severe)       Cerebrovascular Disease: N/A       Chronic Lung Disease: N/A       Peripheral Arterial Disease: N/A       Chronic Kidney Disease (if yes, what is GFR): N/A       Uncontrolled Diabetes (if yes, what is HgbA1C or FBS): N/A       Poorly Controlled Hypertension (if yes, what is SBP): N/A       Morbid Obesity (if yes, what is BMI): N/A       History of Recent Ventricular Arrhythmia: N/A       Inability to Ambulate Safely: N/A       Need for Therapeutic Anticoagulation: N/A       Antiplatelet or Contrast Allergy: N/A    Social History:        Marital:         Tobacco:        ETOH:        Caffeine:     ROS:  CONSTITUTIONAL: No weakness, fevers or chills  EYES/ENT: No visual changes;  No vertigo or throat pain   NECK: No pain or stiffness  RESPIRATORY: No cough, wheezing, hemoptysis; No shortness of breath  CARDIOVASCULAR: No chest pain or palpitations  GASTROINTESTINAL: No abdominal or epigastric pain. No nausea, vomiting, or hematemesis; No diarrhea or constipation. No melena or hematochezia.  GENITOURINARY: No dysuria, frequency or hematuria  NEUROLOGICAL: No numbness or weakness  SKIN: No itching, burning, rashes, or lesions   All other review of systems is negative unless indicated above    PHYSICAL EXAM:    Vital Signs Last 24 Hrs  T(C): --  T(F): --  HR: --  BP: --  BP(mean): --  RR: --  SpO2: --        GENERAL: Pt lying comfortably, NAD.  ENMT: PERRL, +EOMI.  NECK: soft, Supple, No JVD,   CHEST/LUNG: Clear to auscultatation bilaterally; No wheezing.  HEART: S1S2+, Regular rate and rhythm; No murmurs.  ABDOMEN: Soft, Nontender, Nondistended; Bowel sounds present.  MUSCULOSKELETAL: Normal range of motion.  SKIN: No rashes or lesions.  NEURO: AAOX3, no focal deficits, no motor r sensory loss.  PSYCH: normal mood.  VASCULAR:   Radial +2 R/+2 L  Femoral +2 R/+2 L  PT +2 R/+2 L  DP +2 R/+2 L    EKG:      HPI: This is a 42 year old female with PMH of HOCM diagnosed via genetic testing, family h/o SCD, s/p ICD 2/2016 with Dr. Lynn presented to office for follow up after chest pain/BETANCOURT and ICD fire a few months ago. Due to her symptoms she was referred for C to eval for possible CAD as well as cavitary gradient measurements     Symptoms:        Angina (Class):        Ischemic Symptoms:     Heart Failure:        Systolic/Diastolic/Combined: Diastolic       NYHA Class (within 2 weeks):     Assessment of LVEF (Must be within 6 months):       EF: EF 70%       Assessed by: TTE       Date: 9/2022    Prior Cardiac Interventions:       PCI's (Date, Stents, Vessels):        CABG (Date, Grafts):     Noninvasive Testing:   Stress Test: Date: 6/9/22       Protocol: lexiscan        Duration of Exercise:        Symptoms:        EKG Changes:        DTS:        Myocardial Imaging: anterior and lateral wall ischemia       Risk Assessment (Low, Medium, High): medium    Echo (Date, Findings): 9/2022   LVEF 70%  LV c/w HOCM, LA enlargement      TTE Echo Complete w/Doppler (11.01.19 @ 15:15) >  PHYSICIAN INTERPRETATION:  Left Ventricle: The left ventricular internal cavity size is normal.   There is moderate asymmetric left ventricular hypertrophy involving the   septal wall. The LVH involves septal walls.  Global LV systolic function was normal. Left ventricular ejection   fraction, by visual estimation, is 60 to 65%. The left ventricular   diastolic function could not be assessed in this study.  Right Ventricle: The right ventricular size is normal. RV systolic   function is normal.  Left Atrium: Normal left atrial size.  Right Atrium: Normal right atrial size.  Pericardium: There is no evidence of pericardial effusion.  Mitral Valve: The mitral valve is normal in structure. No evidence of   mitral stenosis. Moderate to severe mitral valve regurgitation is seen.   There is ANGE noted with posterior directed MR.  Tricuspid Valve: The tricuspid valve is not well seen. Adequate TR   velocity was not obtained to accurately assess RVSP.  Aortic Valve: The aortic valve was not well visualized.  Pulmonic Valve: The pulmonic valve was not well visualized.  Aorta: The aortic root is normal in size and structure.       Summary:   1. Technically limited study.   2. Left ventricular ejection fraction, by visual estimation, is 60 to   65%.   3. Normal global left ventricular systolic function.   4. The left ventricular diastolic function could not be assessed in this   study.   5. Normal left ventricular internal cavity size.   6. There is moderate asymmetric leftventricular hypertrophy.   7. Normal left atrial size.   8. There is ANGE noted with moderate to severe posteriorly directed MR.   9. There is no evidence of pericardial effusion.          Antianginal Therapies:        Beta Blockers:  metoprolol succ 25mg daily       Calcium Channel Blockers:        Long Acting Nitrates:        Ranexa:       Associated Risk Factors:        Fraility Assessment: N/A (mild, moderate, severe)       Cerebrovascular Disease: N/A       Chronic Lung Disease: N/A       Peripheral Arterial Disease: N/A       Chronic Kidney Disease (if yes, what is GFR): N/A       Uncontrolled Diabetes (if yes, what is HgbA1C or FBS): N/A       Poorly Controlled Hypertension (if yes, what is SBP): N/A       Morbid Obesity (if yes, what is BMI): N/A       History of Recent Ventricular Arrhythmia: N/A       Inability to Ambulate Safely: N/A       Need for Therapeutic Anticoagulation: N/A       Antiplatelet or Contrast Allergy: N/A    Social History:        Marital:         Tobacco:        ETOH:        Caffeine:     ROS:  CONSTITUTIONAL: No weakness, fevers or chills  EYES/ENT: No visual changes;  No vertigo or throat pain   NECK: No pain or stiffness  RESPIRATORY: No cough, wheezing, hemoptysis; No shortness of breath  CARDIOVASCULAR: No chest pain or palpitations  GASTROINTESTINAL: No abdominal or epigastric pain. No nausea, vomiting, or hematemesis; No diarrhea or constipation. No melena or hematochezia.  GENITOURINARY: No dysuria, frequency or hematuria  NEUROLOGICAL: No numbness or weakness  SKIN: No itching, burning, rashes, or lesions   All other review of systems is negative unless indicated above    PHYSICAL EXAM:    Vital Signs Last 24 Hrs  T(C): --  T(F): --  HR: --  BP: --  BP(mean): --  RR: --  SpO2: --        GENERAL: Pt lying comfortably, NAD.  ENMT: PERRL, +EOMI.  NECK: soft, Supple, No JVD,   CHEST/LUNG: Clear to auscultatation bilaterally; No wheezing.  HEART: S1S2+, Regular rate and rhythm; No murmurs.  ABDOMEN: Soft, Nontender, Nondistended; Bowel sounds present.  MUSCULOSKELETAL: Normal range of motion.  SKIN: No rashes or lesions.  NEURO: AAOX3, no focal deficits, no motor r sensory loss.  PSYCH: normal mood.  VASCULAR:   Radial +2 R/+2 L  Femoral +2 R/+2 L  PT +2 R/+2 L  DP +2 R/+2 L    EKG: SR with ST wave abnormality

## 2022-11-28 ENCOUNTER — OUTPATIENT (OUTPATIENT)
Dept: OUTPATIENT SERVICES | Facility: HOSPITAL | Age: 42
LOS: 1 days | Discharge: HOME | End: 2022-11-28
Payer: MEDICAID

## 2022-11-28 ENCOUNTER — TRANSCRIPTION ENCOUNTER (OUTPATIENT)
Age: 42
End: 2022-11-28

## 2022-11-28 VITALS
TEMPERATURE: 98 F | RESPIRATION RATE: 16 BRPM | SYSTOLIC BLOOD PRESSURE: 97 MMHG | HEART RATE: 68 BPM | OXYGEN SATURATION: 98 % | DIASTOLIC BLOOD PRESSURE: 54 MMHG

## 2022-11-28 VITALS
OXYGEN SATURATION: 97 % | RESPIRATION RATE: 16 BRPM | SYSTOLIC BLOOD PRESSURE: 124 MMHG | HEART RATE: 70 BPM | DIASTOLIC BLOOD PRESSURE: 70 MMHG

## 2022-11-28 DIAGNOSIS — Z95.0 PRESENCE OF CARDIAC PACEMAKER: Chronic | ICD-10-CM

## 2022-11-28 DIAGNOSIS — Z98.891 HISTORY OF UTERINE SCAR FROM PREVIOUS SURGERY: Chronic | ICD-10-CM

## 2022-11-28 DIAGNOSIS — I42.2 OTHER HYPERTROPHIC CARDIOMYOPATHY: ICD-10-CM

## 2022-11-28 LAB
ALBUMIN SERPL ELPH-MCNC: 3.9 G/DL — SIGNIFICANT CHANGE UP (ref 3.3–5.2)
ALP SERPL-CCNC: 66 U/L — SIGNIFICANT CHANGE UP (ref 40–120)
ALT FLD-CCNC: 10 U/L — SIGNIFICANT CHANGE UP
ANION GAP SERPL CALC-SCNC: 11 MMOL/L — SIGNIFICANT CHANGE UP (ref 5–17)
AST SERPL-CCNC: 15 U/L — SIGNIFICANT CHANGE UP
BASOPHILS # BLD AUTO: 0.04 K/UL — SIGNIFICANT CHANGE UP (ref 0–0.2)
BASOPHILS NFR BLD AUTO: 0.4 % — SIGNIFICANT CHANGE UP (ref 0–2)
BILIRUB SERPL-MCNC: 0.5 MG/DL — SIGNIFICANT CHANGE UP (ref 0.4–2)
BUN SERPL-MCNC: 14.7 MG/DL — SIGNIFICANT CHANGE UP (ref 8–20)
CALCIUM SERPL-MCNC: 9 MG/DL — SIGNIFICANT CHANGE UP (ref 8.4–10.5)
CHLORIDE SERPL-SCNC: 101 MMOL/L — SIGNIFICANT CHANGE UP (ref 96–108)
CO2 SERPL-SCNC: 26 MMOL/L — SIGNIFICANT CHANGE UP (ref 22–29)
CREAT SERPL-MCNC: 0.55 MG/DL — SIGNIFICANT CHANGE UP (ref 0.5–1.3)
EGFR: 118 ML/MIN/1.73M2 — SIGNIFICANT CHANGE UP
EOSINOPHIL # BLD AUTO: 0.27 K/UL — SIGNIFICANT CHANGE UP (ref 0–0.5)
EOSINOPHIL NFR BLD AUTO: 2.7 % — SIGNIFICANT CHANGE UP (ref 0–6)
GLUCOSE SERPL-MCNC: 86 MG/DL — SIGNIFICANT CHANGE UP (ref 70–99)
HCG SERPL-ACNC: <4 MIU/ML — SIGNIFICANT CHANGE UP
HCT VFR BLD CALC: 35.1 % — SIGNIFICANT CHANGE UP (ref 34.5–45)
HGB BLD-MCNC: 11.7 G/DL — SIGNIFICANT CHANGE UP (ref 11.5–15.5)
IMM GRANULOCYTES NFR BLD AUTO: 0.4 % — SIGNIFICANT CHANGE UP (ref 0–0.9)
LYMPHOCYTES # BLD AUTO: 2.53 K/UL — SIGNIFICANT CHANGE UP (ref 1–3.3)
LYMPHOCYTES # BLD AUTO: 25.1 % — SIGNIFICANT CHANGE UP (ref 13–44)
MCHC RBC-ENTMCNC: 28.4 PG — SIGNIFICANT CHANGE UP (ref 27–34)
MCHC RBC-ENTMCNC: 33.3 GM/DL — SIGNIFICANT CHANGE UP (ref 32–36)
MCV RBC AUTO: 85.2 FL — SIGNIFICANT CHANGE UP (ref 80–100)
MONOCYTES # BLD AUTO: 0.67 K/UL — SIGNIFICANT CHANGE UP (ref 0–0.9)
MONOCYTES NFR BLD AUTO: 6.7 % — SIGNIFICANT CHANGE UP (ref 2–14)
NEUTROPHILS # BLD AUTO: 6.52 K/UL — SIGNIFICANT CHANGE UP (ref 1.8–7.4)
NEUTROPHILS NFR BLD AUTO: 64.7 % — SIGNIFICANT CHANGE UP (ref 43–77)
PLATELET # BLD AUTO: 251 K/UL — SIGNIFICANT CHANGE UP (ref 150–400)
POTASSIUM SERPL-MCNC: 4.4 MMOL/L — SIGNIFICANT CHANGE UP (ref 3.5–5.3)
POTASSIUM SERPL-SCNC: 4.4 MMOL/L — SIGNIFICANT CHANGE UP (ref 3.5–5.3)
PROT SERPL-MCNC: 6.9 G/DL — SIGNIFICANT CHANGE UP (ref 6.6–8.7)
RBC # BLD: 4.12 M/UL — SIGNIFICANT CHANGE UP (ref 3.8–5.2)
RBC # FLD: 14.7 % — HIGH (ref 10.3–14.5)
SODIUM SERPL-SCNC: 138 MMOL/L — SIGNIFICANT CHANGE UP (ref 135–145)
WBC # BLD: 10.07 K/UL — SIGNIFICANT CHANGE UP (ref 3.8–10.5)
WBC # FLD AUTO: 10.07 K/UL — SIGNIFICANT CHANGE UP (ref 3.8–10.5)

## 2022-11-28 PROCEDURE — 85025 COMPLETE CBC W/AUTO DIFF WBC: CPT

## 2022-11-28 PROCEDURE — C1887: CPT

## 2022-11-28 PROCEDURE — 84702 CHORIONIC GONADOTROPIN TEST: CPT

## 2022-11-28 PROCEDURE — 36415 COLL VENOUS BLD VENIPUNCTURE: CPT

## 2022-11-28 PROCEDURE — C1769: CPT

## 2022-11-28 PROCEDURE — 93458 L HRT ARTERY/VENTRICLE ANGIO: CPT | Mod: 26

## 2022-11-28 PROCEDURE — 93010 ELECTROCARDIOGRAM REPORT: CPT

## 2022-11-28 PROCEDURE — 93005 ELECTROCARDIOGRAM TRACING: CPT

## 2022-11-28 PROCEDURE — 99152 MOD SED SAME PHYS/QHP 5/>YRS: CPT

## 2022-11-28 PROCEDURE — 80053 COMPREHEN METABOLIC PANEL: CPT

## 2022-11-28 PROCEDURE — 93458 L HRT ARTERY/VENTRICLE ANGIO: CPT

## 2022-11-28 RX ORDER — ASPIRIN/CALCIUM CARB/MAGNESIUM 324 MG
81 TABLET ORAL ONCE
Refills: 0 | Status: COMPLETED | OUTPATIENT
Start: 2022-11-28 | End: 2022-11-28

## 2022-11-28 RX ORDER — SODIUM CHLORIDE 9 MG/ML
250 INJECTION INTRAMUSCULAR; INTRAVENOUS; SUBCUTANEOUS ONCE
Refills: 0 | Status: COMPLETED | OUTPATIENT
Start: 2022-11-28 | End: 2022-11-28

## 2022-11-28 RX ADMIN — Medication 81 MILLIGRAM(S): at 12:29

## 2022-11-28 RX ADMIN — SODIUM CHLORIDE 250 MILLILITER(S): 9 INJECTION INTRAMUSCULAR; INTRAVENOUS; SUBCUTANEOUS at 12:26

## 2022-11-28 NOTE — DISCHARGE NOTE PROVIDER - CARE PROVIDER_API CALL
Goyo Gipson)  Cardiovascular Disease  [address] [facility], [address] [place] [id number]  Phone: [phone]  Fax: [phone]  Follow Up Time: 1 week

## 2022-11-28 NOTE — DISCHARGE NOTE PROVIDER - NSDCFUSCHEDAPPT_GEN_ALL_CORE_FT
Filiberto Burgess  [facility] Physician [facility]  CARDIOLOGY [address]  Scheduled Appointment: 01/26/2023

## 2022-11-28 NOTE — PROGRESS NOTE ADULT - ASSESSMENT
42 year old female with PMH of HOCM diagnosed via genetic testing, family h/o SCD, s/p ICD 2/2016 with Dr. Lynn presented to office for follow up after chest pain/BETANCOURT and ICD fire a few months ago. Due to her symptoms she was referred for C to eval for possible CAD as well as cavitary gradient measurements. She is now s/p left heart catheterization via RRA switch to RFA approach with Dr. Burgess :, tolerated procedure well without complications. Arrived to recovery room NAD and hemodynamically stable, distal pulse +, neurovascular intact     Intraprocedurally: Pt rec'd IV sedation, Heparin 4,000 units, and omnipaque 71ml  Findings: normal coronaries, with findings of apical variant HOCM of LVOT, medical management      -post cardiac cath orders  -radial and groin precautions  -5 Fr femoral sheath to be removed by ACP at 1530, radial band off at 1600; supine x 1 hour post hemostasis  -NS 0.9% 250ml/hr x 1 bolus: post procedure GAL ppx   -continue current medical therapy  -follow up outpt in 2 weeks with Cardiologist: Dr. Burgess 2 weeks   -Lifestyle modifications discussed to reduce cardiovascular risk factors including weight reduction, smoking cessation, medication compliance, and routine follow up with Cardiologist to track your BMI, cholesterol, and glucose levels.

## 2022-11-28 NOTE — DISCHARGE NOTE PROVIDER - NSDCCPTREATMENT_GEN_ALL_CORE_FT
PRINCIPAL PROCEDURE  Procedure: Left heart cardiac cath  Findings and Treatment: No heavy lifting, driving, sex, tub baths, swimming, or any activity that submerges the lower half of the body in water for 48 hours.  Limited walking and stairs for 48 hours.    Change the bandaid after 24 hours and every 24 hours after that.  Keep the puncture site dry and covered with a bandaid until a scab forms.    Observe the site frequently.  If bleeding or a large lump (the size of a golf ball or bigger) occurs lie flat, apply continuous direct pressure just above the puncture site for at least 10 minutes, and notify your physician immediately.  If the bleeding cannot be controlled, call 911 immediately for assistance.  Notify your physician of pain, swelling or any drainage.    Notify your physician immediately if coldness, numbness, discoloration or pain in your foot occurs.  Restricted use with no heavy lifting of affected arm for 48 hours.  No submerging the arm in water for 48 hours.  You may start showering today.  Call your doctor for any bleeding, swelling, loss of sensation in the hand or fingers, or fingers turning blue.  If heavy bleeding or large lumps form, hold pressure at the spot and come to the Emergency Room.

## 2022-11-28 NOTE — DISCHARGE NOTE PROVIDER - HOSPITAL COURSE
Brief Hospital Course: 42 year old female with PMH of HOCM diagnosed via genetic testing, family h/o SCD, s/p ICD 2/2016 with Dr. Lynn presented to office for follow up after chest pain/BETANCOURT and ICD fire a few months ago. Due to her symptoms she was referred for C to eval for possible CAD as well as cavitary gradient measurements. She is now s/p left heart catheterization via RRA switch to RFA approach with Dr. Burgess :, tolerated procedure well without complications. Arrived to recovery room NAD and hemodynamically stable, distal pulse +, neurovascular intact     Intraprocedurally: Pt rec'd IV sedation, Heparin 4,000 units, and omnipaque 71ml  Findings: normal coronaries, with findings of apical variant HOCM of LVOT, medical management      -post cardiac cath orders  -radial and groin precautions  -continue current medical therapy  -follow up outpt in 2 weeks with Cardiologist: Dr. Burgess 2 weeks   -Lifestyle modifications discussed to reduce cardiovascular risk factors including weight reduction, smoking cessation, medication compliance, and routine follow up with Cardiologist to track your BMI, cholesterol, and glucose levels.     At the time of discharge patient was hemodynamically stable and amenable to all terms of discharge. The patient has received verbal instructions from myself regarding discharge plans.     Length of Discharge: 45MIN    Patient is medically stable and cleared for discharge to home with outpatient follow up.

## 2022-11-28 NOTE — DISCHARGE NOTE PROVIDER - CARE PROVIDERS DIRECT ADDRESSES
All other review of systems negative, except as noted in HPI
[email or web address]

## 2022-11-28 NOTE — DISCHARGE NOTE PROVIDER - NSDCMRMEDTOKEN_GEN_ALL_CORE_FT
metoprolol succinate 25 mg oral tablet, extended release: 1 tab(s) orally 2 times a day  sotalol 80 mg oral tablet: 1 tab(s) orally every 12 hours

## 2022-11-28 NOTE — DISCHARGE NOTE NURSING/CASE MANAGEMENT/SOCIAL WORK - NSDCPEFALRISK_GEN_ALL_CORE
For information on Fall & Injury Prevention, visit: [email or web address] OR  [email or web address] OR  https://www.cdc.gov/steadi/patient.html

## 2022-11-28 NOTE — PROGRESS NOTE ADULT - SUBJECTIVE AND OBJECTIVE BOX
Department of Cardiology                                                                  [facility]/[place] [address] [address] [facility]                                                            Telephone: [phone]. Fax:[phone]                                                    Post- Procedure Note: Left Heart Cardiac Catheterization       Narrative: 42 year old female with PMH of HOCM diagnosed via genetic testing, family h/o SCD, s/p ICD 2016 with Dr. Lynn presented to office for follow up after chest pain/BETANCOURT and ICD fire a few months ago. Due to her symptoms she was referred for C to eval for possible CAD as well as cavitary gradient measurements. She is now s/p left heart catheterization via RRA switch to RFA approach with Dr. Burgess :, tolerated procedure well without complications. Arrived to recovery room NAD and hemodynamically stable, distal pulse +, neurovascular intact          PAST MEDICAL & SURGICAL HISTORY:  Seizure disorder  Preeclampsia  Hyperlipidemia  Palpitations  Hypertrophic cardiomyopathy  Pacemaker   Pre-eclampsia, antepartum        H/O:       Cardiac pacemaker          intolerances:   codeine (Vomiting)  predniSONE (Other)  sulfa drugs (Seizure)      Objective:  Vital Signs Last 24 Hrs  T(C): 36.7 (2022 11:50), Max: 36.7 (2022 11:50)  T(F): 98 (2022 11:50), Max: 98 (2022 11:50)  HR: 64 (2022 14:45) (62 - 68)  BP: 116/66 (2022 14:45) (97/54 - 134/66)  RR: 16 (2022 14:45) (16 - 17)  SpO2: 98% (2022 14:45) (93% - 98%)    Parameters below as of 2022 14:45  Patient On (Oxygen Delivery Method): nasal cannula  O2 Flow (L/min): 2      GENERAL: Pt lying comfortably, NAD.  ENMT: PERRL, +EOMI.  NECK: soft, Supple, No JVD,   CHEST/LUNG: Clear to auscultation bilaterally; No wheezing.  HEART: S1S2+, Regular rate and rhythm; No murmurs.  ABDOMEN: Soft, Nontender, Nondistended; Bowel sounds present.  MUSCULOSKELETAL: Normal range of motion.  SKIN: No rashes or lesions.  NEURO: AAOX3, no focal deficits, no motor r sensory loss.  PSYCH: normal mood.  Procedure site:  no signs of bleeding or hematoma, neurovascular intact   VASCULAR:   Radial +2 R/+2 L  Femoral +2 R/+2 L  PT +2 R/+2 L  DP +2 R/+2 L                          11.7   10.07 )-----------( 251      ( 2022 11:50 )             35.1         138  |  101  |  14.7  ----------------------------<  86  4.4   |  26.0  |  0.55    Ca    9.0      2022 11:50    TPro  6.9  /  Alb  3.9  /  TBili  0.5  /  DBili  x   /  AST  15  /  ALT  10  /  AlkPhos  66

## 2022-11-28 NOTE — DISCHARGE NOTE PROVIDER - NSDCCPCAREPLAN_GEN_ALL_CORE_FT
PRINCIPAL DISCHARGE DIAGNOSIS  Diagnosis: Hypertrophic obstructive cardiomyopathy  Assessment and Plan of Treatment: You had a left heart cardiac catheterization today which revealed normal coronary arteries. Measurements of left ventricular cavity confirmed an apical variant which is not a candidate for alcohol septal ablation. At this time please continue home medications and follow up with Dr. Gipson in 1-2 weeks  -continue metoprolol and sotalol  -avoid high stress situations and remain adequately hydrated

## 2022-11-28 NOTE — DISCHARGE NOTE PROVIDER - NSDCFUADDINST_GEN_ALL_CORE_FT
Restricted use with no heavy lifting of affected arm for 48 hours.  No submerging the arm in water for 48 hours.  You may start showering today.  Call your doctor for any bleeding, swelling, loss of sensation in the hand or fingers, or fingers turning blue.  If heavy bleeding or large lumps form, hold pressure at the spot and come to the Emergency Room.   No heavy lifting, driving, sex, tub baths, swimming, or any activity that submerges the lower half of the body in water for 48 hours.  Limited walking and stairs for 48 hours.    Change the bandaid after 24 hours and every 24 hours after that.  Keep the puncture site dry and covered with a bandaid until a scab forms.    Observe the site frequently.  If bleeding or a large lump (the size of a golf ball or bigger) occurs lie flat, apply continuous direct pressure just above the puncture site for at least 10 minutes, and notify your physician immediately.  If the bleeding cannot be controlled, call 911 immediately for assistance.  Notify your physician of pain, swelling or any drainage.    Notify your physician immediately if coldness, numbness, discoloration or pain in your foot occurs.

## 2022-12-06 DIAGNOSIS — R94.39 ABNORMAL RESULT OF OTHER CARDIOVASCULAR FUNCTION STUDY: ICD-10-CM

## 2023-01-26 ENCOUNTER — APPOINTMENT (OUTPATIENT)
Dept: CARDIOLOGY | Facility: CLINIC | Age: 43
End: 2023-01-26

## 2023-02-07 RX ORDER — CEFDINIR 300 MG/1
300 CAPSULE ORAL
Refills: 0 | Status: DISCONTINUED | COMMUNITY
Start: 2022-10-21 | End: 2023-02-07

## 2023-02-08 ENCOUNTER — APPOINTMENT (OUTPATIENT)
Dept: ELECTROPHYSIOLOGY | Facility: CLINIC | Age: 43
End: 2023-02-08
Payer: MEDICAID

## 2023-02-08 VITALS
DIASTOLIC BLOOD PRESSURE: 76 MMHG | HEIGHT: 59 IN | SYSTOLIC BLOOD PRESSURE: 110 MMHG | WEIGHT: 175 LBS | BODY MASS INDEX: 35.28 KG/M2 | HEART RATE: 54 BPM

## 2023-02-08 PROCEDURE — 93000 ELECTROCARDIOGRAM COMPLETE: CPT

## 2023-02-08 PROCEDURE — 99215 OFFICE O/P EST HI 40 MIN: CPT | Mod: 25

## 2023-02-08 NOTE — DISCUSSION/SUMMARY
[FreeTextEntry1] :  42-year-old woman with history of obesity, HLD, hypertrophic cardiomyopathy (HCM) with outflow tract obstruction, NSVT and family history of sudden death s/p S-ICD on 4/20/18, presenting for follow-up of VT/VF and ICD shocks.   She recently had another episode of VF, potentially c/w Torsades de Pointes, which was successfully treated by her ICD. This is her first event and ICD shock since she was started on sotalol last year. She does  now have a prolonged QT interval, which may have predisposed to Torsades; it is possible that lower HRs increased the effect of sotalol (reverse use dependence) and resulted in the increased QT- and will therefore lower the metoprolol dose back to 50mg qam and 25mg pm. In the future, atrial pacing would also help reduce this risk, and she may ultimately benefit from transition from an S-ICD to a dual chamber transvenous ICD- will plan this in the future, but if it can wait until her next needed generator replacement (currently battery longevity is estimated 42%) that would be better. In addition, there is a possibility that hormonal changes related to her increasingly irregular menses and menstrual cycle may be contributing to her arrhythmic vulnerability, and she has noted a pattern of her VT/VF occurring around her cycle- she will discuss further with her gynecologist about this, and a hysterectomy may be a good option to eliminate this factor, particularly as she has been unable to tolerate oral contraceptives due to depression in the past. A final option may be a thoracic sympathectomy. Given her young age, will continue to avoid amiodarone at this point.  \par \par -will reduce metoprolol as above \par \par -cardiac MRI \par \par -follow-up next month with Dr Gipson, with repeat ECG \par \par -gynecology followup \par \par -if recurrent ICD shocks despite above, will consider sympathectomy and/or transition to transvenous ICD with atrial pacing  [EKG obtained to assist in diagnosis and management of assessed problem(s)] : EKG obtained to assist in diagnosis and management of assessed problem(s)

## 2023-02-08 NOTE — REVIEW OF SYSTEMS
[Fever] : no fever [Chills] : no chills [Feeling Fatigued] : not feeling fatigued [SOB] : no shortness of breath [Dyspnea on exertion] : dyspnea during exertion [Chest Discomfort] : chest discomfort [Lower Ext Edema] : no extremity edema [Leg Claudication] : no intermittent leg claudication [Palpitations] : no palpitations [Syncope] : no syncope [Abn Vaginal Bleeding] : unexplained vaginal bleeding [Dizziness] : no dizziness [Convulsions] : no convulsions [Negative] : Psychiatric

## 2023-02-08 NOTE — CARDIOLOGY SUMMARY
[de-identified] : 2/8/23 sinus rhythm 54 bpm, LVH and diffuse ST Tw abnormalities, QTc 490 ms\par 11/16/22: sinus rhythm 54bpm, LVH  and diffuse ST/Tw abnormalities (unchanged). QTc 452 ms\par 6/15/22 sinus rhythm 71 bpm, LVH and diffuse ST/Tw abnormalities (unchanged). QTc 443 ms [de-identified] : 6/9/22 mild-mod abnormal, anterolateral ischemia, with infarct of anterolateral wall [de-identified] : TTE 3/15/22 LVEF 70%, LA 3.9cm, mod septal hypertrophy and HCM with outflow obstruction, resting gradient 73mmHg, 130mmHg post valsalva, mild MR, mild TR, RVSP <35  [___] : [unfilled]

## 2023-02-08 NOTE — HISTORY OF PRESENT ILLNESS
[FreeTextEntry1] : 42-year-old woman with history of obesity, HLD, hypertrophic cardiomyopathy (HCM) with outflow tract obstruction, NSVT and family history of sudden death s/p S-ICD on 4/20/18, presenting for follow-up of VT/VF and ICD shocks.    \par \par She underwent S-ICD implant for primary prevention in 4/2018. In 4/2019 she had an episode of ventricular tachycardia and had appropriate defibrillation from her S-ICD. This occurred during sleep and she was unaware, but it was identified during subsequent device follow-up.    \par \par On 6/22/20, she had another episode of polymorphic VT/VF while sleeping, which resulted in a successful ICD shock. The episode did appear to initiate with a PVC which occurred on top of a T-wave. She was sleeping when the shock occurred.   \par \par Given concern for possible PVC-triggered VF, a 3-day MCOT monitor was performed 7/23-7/26/20 and revealed sinus rhythm with avg HR 64 bpm (range ), rare PVCs (<1%) and NSVT up to 6 beats, and brief runs of SVT up to 4 beats (one symptom triggered episode c/w with brief SVT).   \par \par In 5/2022 she again had polymorphic VT (2 episodes in a month period). She was started on sotalol. She had QT prolongation with 120mg bid, and this was changed to 80mg bid. Metoprolol was lowered from 100mg daily to 25mg bid.   \par \par She did well since that time, but now again presented with an ICD shock.  \par \par On 2/5/23 while sleeping she had another ICD shock- she reports remembering breathing heavy while semi-sleeping before the shock. On interrogation of her S-ICD the rhythm appears to be VF and consistent with Torsades de Pointes, with the initial PVC occurring on the Tw. The QT interval on the preeceding rhythm appears to be >50% the RR interval.  \par \par On ECG today she is in sinus rhythm at 54 bpm, with QTc 490 ms ( ms). Of note previously the QTc interval was 450 ms (on ECG 12.15.22).  \par \par Metoprolol had been gradually increased from 25mg bid to 50mg bid over the last few weeks.  \par \par Also of note, she reports priro to the recent shock she has had a very irregular and heavy menses, and she also also noted previously that she had irregular menses prior to her ICD shocks. She has been unable to take oral contraceptives in the past, and hysterectomy had been discussed.  \par \par Previously she did have a nuclear stress test concerning for anterolateral ischemia. Cardiac catheterization was performed which demonstrated normal coronary arteries.    \par \par Her outflow gradient has increased- she has also been considered for septal ablation/myomectomy, and there has also been concern about the degree of her mitral regurgitation.  \par \par A cardiac MRI is now planned.  \par \par Current medications include metoprolol succinate 50 mg BID, sotalol 80mg BID, Lasix, Lexapro, fluticasone, omeprazole, simvastatin

## 2023-02-08 NOTE — PHYSICAL EXAM
[General Appearance - Well Developed] : well developed [General Appearance - Well Nourished] : well nourished [General Appearance - In No Acute Distress] : no acute distress [Normal Conjunctiva] : the conjunctiva exhibited no abnormalities [Normal Oral Mucosa] : normal oral mucosa [Normal Oropharynx] : normal oropharynx [Normal Jugular Venous V Waves Present] : normal jugular venous V waves present [] : no respiratory distress [Respiration, Rhythm And Depth] : normal respiratory rhythm and effort [Auscultation Breath Sounds / Voice Sounds] : lungs were clear to auscultation bilaterally [Heart Rate And Rhythm] : heart rate and rhythm were normal [Edema] : no peripheral edema present [Abdomen Soft] : soft [Abnormal Walk] : normal gait [Nail Clubbing] : no clubbing of the fingernails [Cyanosis, Localized] : no localized cyanosis [FreeTextEntry1] : incision sites (left lateral chest wall and subxiphoid) well healed, no erythema, bleeding or swelling.  [Oriented To Time, Place, And Person] : oriented to person, place, and time [Impaired Insight] : insight and judgment were intact

## 2023-03-16 ENCOUNTER — OUTPATIENT (OUTPATIENT)
Dept: OUTPATIENT SERVICES | Facility: HOSPITAL | Age: 43
LOS: 1 days | End: 2023-03-16
Payer: MEDICAID

## 2023-03-16 ENCOUNTER — RESULT REVIEW (OUTPATIENT)
Age: 43
End: 2023-03-16

## 2023-03-16 DIAGNOSIS — Z95.0 PRESENCE OF CARDIAC PACEMAKER: Chronic | ICD-10-CM

## 2023-03-16 DIAGNOSIS — I42.1 OBSTRUCTIVE HYPERTROPHIC CARDIOMYOPATHY: ICD-10-CM

## 2023-03-16 DIAGNOSIS — Z98.891 HISTORY OF UTERINE SCAR FROM PREVIOUS SURGERY: Chronic | ICD-10-CM

## 2023-03-16 PROCEDURE — 71046 X-RAY EXAM CHEST 2 VIEWS: CPT

## 2023-03-16 PROCEDURE — 75561 CARDIAC MRI FOR MORPH W/DYE: CPT | Mod: 26

## 2023-03-16 PROCEDURE — 75561 CARDIAC MRI FOR MORPH W/DYE: CPT

## 2023-03-16 PROCEDURE — 71046 X-RAY EXAM CHEST 2 VIEWS: CPT | Mod: 26

## 2023-05-16 RX ORDER — MEDROXYPROGESTERONE ACETATE 150 MG/ML
150 INJECTION, SUSPENSION INTRAMUSCULAR
Qty: 1 | Refills: 3 | Status: DISCONTINUED | COMMUNITY
Start: 2022-10-06 | End: 2023-05-16

## 2023-05-16 RX ORDER — TORSEMIDE 5 MG/1
5 TABLET ORAL
Qty: 90 | Refills: 1 | Status: ACTIVE | COMMUNITY
Start: 2023-05-16

## 2023-05-16 RX ORDER — SIMVASTATIN 20 MG/1
20 TABLET, FILM COATED ORAL
Qty: 90 | Refills: 1 | Status: DISCONTINUED | COMMUNITY
Start: 2022-11-16 | End: 2023-05-16

## 2023-05-16 RX ORDER — FAMOTIDINE 20 MG/1
20 TABLET, FILM COATED ORAL DAILY
Qty: 90 | Refills: 0 | Status: ACTIVE | COMMUNITY
Start: 2023-05-16

## 2023-05-16 RX ORDER — FLUTICASONE PROPIONATE 50 UG/1
50 SPRAY, METERED NASAL DAILY
Refills: 0 | Status: ACTIVE | COMMUNITY

## 2023-05-16 RX ORDER — ALPRAZOLAM 0.5 MG/1
0.5 TABLET ORAL
Refills: 0 | Status: ACTIVE | COMMUNITY

## 2023-05-17 ENCOUNTER — APPOINTMENT (OUTPATIENT)
Dept: ELECTROPHYSIOLOGY | Facility: CLINIC | Age: 43
End: 2023-05-17
Payer: MEDICAID

## 2023-05-17 VITALS
HEIGHT: 59 IN | HEART RATE: 68 BPM | DIASTOLIC BLOOD PRESSURE: 78 MMHG | SYSTOLIC BLOOD PRESSURE: 124 MMHG | BODY MASS INDEX: 35.08 KG/M2 | WEIGHT: 174 LBS

## 2023-05-17 PROCEDURE — 99215 OFFICE O/P EST HI 40 MIN: CPT | Mod: 25

## 2023-05-17 PROCEDURE — 93000 ELECTROCARDIOGRAM COMPLETE: CPT

## 2023-05-17 NOTE — ADDENDUM
[FreeTextEntry1] : I was present for the above evaluation. \par Pt with significant symptoms of depression and frequently tearful. No new arrhythmia events noted on current medication regimen. \par Concern that high dose beta blockade could contribute to her depression, will lower metoprolol as above, and eliminate morning dose. She will continue sotalol. \par OK to start SSRI, but will need monitoring of ECG including QT intervals after this.

## 2023-05-17 NOTE — CARDIOLOGY SUMMARY
[___] : [unfilled] [de-identified] : 5/17/23 sinus rhythm 68bpm with narrow QRS, LVH and diffuse ST/Tw abnormalities, QTc 448ms. \par 2/8/23 sinus rhythm 54 bpm, LVH and diffuse ST/Tw abnormalities, QTc 490 ms\par 11/16/22: sinus rhythm 54bpm, LVH  and diffuse ST/Tw abnormalities (unchanged). QTc 452 ms\par 6/15/22 sinus rhythm 71 bpm, LVH and diffuse ST/Tw abnormalities (unchanged). QTc 443 ms [de-identified] : 6/9/22 mild-mod abnormal, anterolateral ischemia, with infarct of anterolateral wall [de-identified] : TTE 3/15/22 LVEF 70%, LA 3.9cm, mod septal hypertrophy and HCM with outflow obstruction, resting gradient 73mmHg, 130mmHg post valsalva, mild MR, mild TR, RVSP <35  [de-identified] : Cardiac MRI 3/16/23:subtle late gadolinium enhancement along the inferior hinge point of the RV, and septal hypertrophy that could not be accurately quantified due to artifact from ICD.

## 2023-05-17 NOTE — HISTORY OF PRESENT ILLNESS
[FreeTextEntry1] : 42-year-old woman with history of obesity, HLD, hypertrophic cardiomyopathy (HCM) with outflow tract obstruction, NSVT and family history of sudden death s/p S-ICD on 4/20/18, presenting for follow-up of VT/VF and ICD shocks. \par \par She underwent S-ICD implant for primary prevention in 4/2018. In 4/2019 she had an episode of ventricular tachycardia and had appropriate defibrillation from her S-ICD. This occurred during sleep, and she was unaware, but it was identified during subsequent device follow-up.  \par \par On 6/22/20, she had another episode of polymorphic VT/VF while sleeping, which resulted in a successful ICD shock. The episode did appear to start with a PVC which occurred on top of a T-wave. She was sleeping when the shock occurred. \par \par Given concern for possible PVC-triggered VF, a 3-day MCOT monitor was performed 7/23-7/26/20 and revealed sinus rhythm with avg HR 64 bpm (range ), rare PVCs (<1%) and NSVT up to 6 beats, and brief runs of SVT up to 4 beats (one symptom triggered episode c/w with brief SVT). \par \par In 5/2022 she again had polymorphic VT (2 episodes in a month period). She was started on sotalol. She had QT prolongation with 120mg bid, and this was changed to 80mg bid. Metoprolol was lowered from 100mg daily to 25mg bid. \par \par  She did well for several months until 2/5/23 when she had another ICD shock while sleeping - she reports remembering breathing heavy while semi-sleeping before the shock. On interrogation of her S-ICD the rhythm appears to be consistent with PVC induced Torsades de Pointes. The QT interval on the preceding rhythm appears to be >50% the RR interval. \par \par On ECG today she is in sinus rhythm at 54 bpm, with QTc 490 ms ( ms). Of note previously the QTc interval was 450 ms (on ECG 12.15.22). \par \par Metoprolol had been gradually increased from 25mg bid to 50mg bid over the last few weeks. \par \par Also of note, she reports prior to the recent shock she has had a very irregular and heavy menses, and she also noted that she had irregular menses prior to other ICD shocks. She has been unable to take oral contraceptives in the past, and hysterectomy had been discussed. \par \par Previously she had a nuclear stress test concerning for anterolateral ischemia. Cardiac catheterization was performed which demonstrated normal coronary arteries. \par \par Her outflow gradient has increased- she has also been considered for septal ablation/myomectomy, and there has also been concern about the degree of her mitral regurgitation. She is scheduled for a repeat echocardiogram in a few weeks.  \par \par Cardiac MRI 3/16/23 demonstrated subtle late gadolinium enhancement along the inferior hinge point of the RV, and septal hypertrophy that could not be accurately quantified due to artifact from the ICD. \par \par Device interrogation today demonstrates normal device function with no recurrent arrhythmias.  \par \par However, she does express recent periods of significant anxiety about getting shocked again, which often prevents her from sleeping. She also experiences sudden fear of being shocked at times when exercising. She has also recently noted a more depressed mood with periods of wanting to cry without provocation. She notes she was on Lexapro for a short time when she experienced post partum depression after the birth of her second child but has been off it for a few years.  \par \par ECG today demonstrates sinus rhythm at 68bpm with narrow QRS and QTc 448ms. \par Current medications include metoprolol succinate 25 mg qAM & 50mg qPM, sotalol 80mg BID, torsemide, fluticasone, famotidine

## 2023-05-17 NOTE — DISCUSSION/SUMMARY
[EKG obtained to assist in diagnosis and management of assessed problem(s)] : EKG obtained to assist in diagnosis and management of assessed problem(s) [FreeTextEntry1] : 42-year-old woman with history of obesity, HLD, hypertrophic cardiomyopathy (HCM) with outflow tract obstruction, NSVT and family history of sudden death s/p S-ICD on 4/20/18, presenting for follow-up of VT/VF and ICD shocks. She most recently had an episode of VF in 2/2023, c/w PVC induced Torsades de Pointes, which was successfully treated by her ICD. This was her first event and ICD shock since she was started on sotalol in 5/2022. She was noted to have a prolonged QT in the setting of sinus bradycardia (50s bpm); and the suspicion was that lower HRs increased the effect of sotalol (reverse use dependence) resulting in the increased QT. Metoprolol was decreased to 50mg qam and 25mg pm at that time. She has not had recurrent ventricular arrhythmias since, and her sinus rate today is 68bpm with QTc 448ms.  \par \par  \par \par If further events do occur in the future, atrial pacing may be needed to prevent bradycardia, and she may ultimately benefit from transition from an S-ICD to a dual chamber transvenous ICD. A thoracic sympathectomy could also be considered for recurrent arrhythmias.  \par \par In addition, there is a possibility that hormonal changes related to her increasingly irregular menses and menstrual cycle may be contributing to her arrhythmic vulnerability, and she has noted a pattern of her VT/VF occurring around her cycle. Her menses has become more sporadic and she suspects she is starting menopause. As her arrhythmias have been under good control, consideration of hysterectomy is deferred at this point.  \par \par Her most significant issue today seems to be depression and anxiety, likely related (at least in part) to PTSD from recurrent ICD shocks. She is also predisposed to depression based on her history of post partum depression.  \par \par -Restart Lexapro. Appropriate education provided, including monitoring for signs of worsening depression and suicidal ideations. Given concomitant sotalol, will f/up in 1 week for ECG to assess QTc.  \par \par -Decrease metoprolol to 50mg qPM.  \par \par -Continue sotalol 80mg BID.  \par \par -EP follow up in 3 months; sooner PRN.

## 2023-05-17 NOTE — REVIEW OF SYSTEMS
[Feeling Fatigued] : feeling fatigued [Dyspnea on exertion] : dyspnea during exertion [Chest Discomfort] : chest discomfort [Depression] : depression [Anxiety] : anxiety [Negative] : Psychiatric [Fever] : no fever [Chills] : no chills [SOB] : no shortness of breath [Lower Ext Edema] : no extremity edema [Leg Claudication] : no intermittent leg claudication [Palpitations] : no palpitations [Syncope] : no syncope [Dizziness] : no dizziness [Convulsions] : no convulsions [Weakness] : no weakness [Suicidal] : not suicidal [Easy Bleeding] : no tendency for easy bleeding [Easy Bruising] : no tendency for easy bruising

## 2023-05-25 ENCOUNTER — APPOINTMENT (OUTPATIENT)
Dept: OBGYN | Facility: CLINIC | Age: 43
End: 2023-05-25
Payer: MEDICAID

## 2023-05-25 ENCOUNTER — ASOB RESULT (OUTPATIENT)
Age: 43
End: 2023-05-25

## 2023-05-25 VITALS
HEART RATE: 57 BPM | HEIGHT: 59 IN | WEIGHT: 169 LBS | SYSTOLIC BLOOD PRESSURE: 117 MMHG | DIASTOLIC BLOOD PRESSURE: 76 MMHG | BODY MASS INDEX: 34.07 KG/M2

## 2023-05-25 DIAGNOSIS — R19.06 EPIGASTRIC SWELLING, MASS OR LUMP: ICD-10-CM

## 2023-05-25 DIAGNOSIS — N64.52 NIPPLE DISCHARGE: ICD-10-CM

## 2023-05-25 DIAGNOSIS — N92.6 IRREGULAR MENSTRUATION, UNSPECIFIED: ICD-10-CM

## 2023-05-25 DIAGNOSIS — U07.1 COVID-19: ICD-10-CM

## 2023-05-25 DIAGNOSIS — O09.891 SUPERVISION OF OTHER HIGH RISK PREGNANCIES, FIRST TRIMESTER: ICD-10-CM

## 2023-05-25 DIAGNOSIS — R79.89 OTHER SPECIFIED ABNORMAL FINDINGS OF BLOOD CHEMISTRY: ICD-10-CM

## 2023-05-25 DIAGNOSIS — Z87.898 PERSONAL HISTORY OF OTHER SPECIFIED CONDITIONS: ICD-10-CM

## 2023-05-25 PROCEDURE — 76856 US EXAM PELVIC COMPLETE: CPT | Mod: 59

## 2023-05-25 PROCEDURE — 99396 PREV VISIT EST AGE 40-64: CPT

## 2023-05-25 PROCEDURE — 76830 TRANSVAGINAL US NON-OB: CPT

## 2023-05-25 PROCEDURE — 99213 OFFICE O/P EST LOW 20 MIN: CPT | Mod: 25

## 2023-05-26 PROBLEM — N92.6 IRREGULAR MENSTRUAL CYCLE: Status: ACTIVE | Noted: 2023-05-26

## 2023-05-26 RX ORDER — NORETHINDRONE AND ETHINYL ESTRADIOL 1 MG-35MCG
1-35 KIT ORAL
Qty: 84 | Refills: 3 | Status: ACTIVE | COMMUNITY
Start: 2023-05-26 | End: 1900-01-01

## 2023-05-26 NOTE — PLAN
[FreeTextEntry1] : Patient is a 42-year-old  2 para 2 last menstrual May 18, 2023\par Presents for annual visit complaining of cycle lasted approximately 5 days and then spotting for approximately 1 week past that\par Physical exam reveals a well-developed well-nourished female no apparent distress,, BMI 32\par Heart regular rhythm and rate, lungs clear, heart regular rhythm and rate, lungs clear, breast no mass nontender no skin changes, no nipple discharge no adenopathy.\par Abdomen soft nontender no organomegaly\par Pelvic exam shows normal female external genitalia, vagina lesions, cervix appropriate size nontender, uterus anteverted normal size nontender, adnexa no mass nontender.\par Pap smear was performed\par Patient will be given a prescription for breast mammogram and sonogram\par Patient does state she had COVID diagnosis back in 2022, denies any residual symptoms or deficits, and states she has been vaccinated and boosted\par Pelvic sonogram\par Uterus 7.49 x 4.12 x 3.61\par Cervical length 4.78 cm\par Endometrial thickness 4.7 mm\par No fluid seen in the cul-de-sac\par Right ovary 2.42 x 2.6 x 1.63 polycystic in appearance\par Left ovary 3.15 x 2.61 x 2.33 again with polycystic in appearance\par Result discussed with patient\par Reassured\par Patient does have history of irregular spotting but endometrium measuring 4.7 mm at this point does not justify perform endometrial biopsy\par Results discussed with patient\par Reassured\par Patient will be started on parenteral pills alycean\par

## 2023-05-26 NOTE — PHYSICAL EXAM
[Chaperone Present] : A chaperone was present in the examining room during all aspects of the physical examination [Appropriately responsive] : appropriately responsive [Alert] : alert [No Acute Distress] : no acute distress [No Lymphadenopathy] : no lymphadenopathy [Regular Rate Rhythm] : regular rate rhythm [Clear to Auscultation B/L] : clear to auscultation bilaterally [Soft] : soft [Non-tender] : non-tender [Non-distended] : non-distended [No HSM] : No HSM [No Lesions] : no lesions [No Mass] : no mass [Oriented x3] : oriented x3 [Examination Of The Breasts] : a normal appearance [No Masses] : no breast masses were palpable [Labia Majora] : normal [Labia Minora] : normal [Normal] : normal [Anteversion] : anteverted [Uterine Adnexae] : normal [FreeTextEntry6] : No masses, nontender, no skin changes, nipple discharge, no adenopathy. [Tenderness] : nontender [Mass ___ cm] : no uterine mass was palpated

## 2023-05-26 NOTE — HISTORY OF PRESENT ILLNESS
[FreeTextEntry1] : Patient is a 42-year-old  2 para 2 last menstrual period May 18,023\par Patient presents for annual visit complaining of some irregular bleeding where she would have a cycle for probably 5 days in that spot for approximately 1 week later\par

## 2023-06-02 LAB — HPV HIGH+LOW RISK DNA PNL CVX: NOT DETECTED

## 2023-08-15 RX ORDER — ATORVASTATIN CALCIUM 10 MG/1
10 TABLET, FILM COATED ORAL DAILY
Refills: 0 | Status: ACTIVE | COMMUNITY
Start: 2023-08-15

## 2023-08-16 ENCOUNTER — APPOINTMENT (OUTPATIENT)
Dept: ELECTROPHYSIOLOGY | Facility: CLINIC | Age: 43
End: 2023-08-16
Payer: MEDICAID

## 2023-08-16 VITALS
SYSTOLIC BLOOD PRESSURE: 116 MMHG | BODY MASS INDEX: 35.08 KG/M2 | DIASTOLIC BLOOD PRESSURE: 70 MMHG | HEART RATE: 60 BPM | WEIGHT: 174 LBS | HEIGHT: 59 IN

## 2023-08-16 DIAGNOSIS — J20.9 ACUTE BRONCHITIS, UNSPECIFIED: ICD-10-CM

## 2023-08-16 PROCEDURE — 93282 PRGRMG EVAL IMPLANTABLE DFB: CPT

## 2023-08-16 PROCEDURE — 99215 OFFICE O/P EST HI 40 MIN: CPT | Mod: 25

## 2023-08-16 PROCEDURE — 93000 ELECTROCARDIOGRAM COMPLETE: CPT | Mod: 59

## 2023-08-16 RX ORDER — ESCITALOPRAM OXALATE 10 MG/1
10 TABLET, FILM COATED ORAL
Refills: 0 | Status: ACTIVE | COMMUNITY

## 2023-08-16 RX ORDER — AMOXICILLIN 500 MG/1
500 TABLET, FILM COATED ORAL 3 TIMES DAILY
Qty: 15 | Refills: 0 | Status: ACTIVE | COMMUNITY
Start: 2023-08-16 | End: 1900-01-01

## 2023-08-16 NOTE — DISCUSSION/SUMMARY
[EKG obtained to assist in diagnosis and management of assessed problem(s)] : EKG obtained to assist in diagnosis and management of assessed problem(s) [FreeTextEntry1] : 43-year-old woman with history of obesity, HLD, hypertrophic cardiomyopathy (HCM) with outflow tract obstruction, NSVT and family history of sudden death s/p S-ICD on 4/20/18, presenting for follow-up of VT/VF and ICD shocks.   She most recently had an episode of VF in 2/2023, c/w PVC induced Torsades de Pointes, which was successfully treated by her ICD. This was her first event and ICD shock since she was started on sotalol in 5/2022. She was noted to have a prolonged QT in the setting of sinus bradycardia (50s bpm); and the suspicion was that lower HRs increased the effect of sotalol (reverse use dependence) resulting in the increased QT. Metoprolol was decreased to 50mg q pm at that time. She has not had recurrent ventricular arrhythmias since, and her sinus rate today is 68bpm with QTc 458ms.    If further events do occur in the future, atrial pacing may be need to prevent bradycardia, and she may ultimately benefit from transition from an S-ICD to a dual chamber transvenous ICD. A thoracic sympathectomy could also be considered for recurrent arrhythmias.    In addition, there is a possibility that hormonal changes related to her increasingly irregular menses and menstrual cycle may be contributing to her arrhythmic vulnerability, and she has noted a pattern of her VT/VF occurring around her cycle. Her menses has become more sporadic and she suspects she is starting menopause. As her arrhythmias have been under good control, consideration of hysterectomy is deferred at this point.    At her last visit, Lexapro was restarted for treatment of depression and anxiety, likely related (at least in part) to PTSD from recurrent ICD shocks. She has noted substantial improvement in mood, decreased anxiety and improved sleep. QTc has remained stable on concomitant sotalol.   -Continue metoprolol 50mg qPM.    -Continue sotalol 80mg BID.    - Continue Lexapro 10mg daily.   -EP follow up in 6 months; sooner PRN.

## 2023-08-16 NOTE — CARDIOLOGY SUMMARY
[___] : [unfilled] [de-identified] : 8/16/23 sinus rhythm 60 bpm with narrow QRS, LVH and diffuse ST/Tw abnormalities, and QTc 458ms 5/17/23 sinus rhythm 68bpm with narrow QRS, LVH and diffuse ST/Tw abnormalities, QTc 448ms.  2/8/23 sinus rhythm 54 bpm, LVH and diffuse ST/Tw abnormalities, QTc 490 ms 11/16/22: sinus rhythm 54bpm, LVH  and diffuse ST/Tw abnormalities (unchanged). QTc 452 ms 6/15/22 sinus rhythm 71 bpm, LVH and diffuse ST/Tw abnormalities (unchanged). QTc 443 ms [de-identified] : 6/9/22 mild-mod abnormal, anterolateral ischemia, with infarct of anterolateral wall [de-identified] : TTE 3/15/22 LVEF 70%, LA 3.9cm, mod septal hypertrophy and HCM with outflow obstruction, resting gradient 73mmHg, 130mmHg post valsalva, mild MR, mild TR, RVSP <35  [de-identified] : Cardiac MRI 3/16/23:subtle late gadolinium enhancement along the inferior hinge point of the RV, and septal hypertrophy that could not be accurately quantified due to artifact from ICD.

## 2023-08-16 NOTE — REASON FOR VISIT
[Arrhythmia/ECG Abnorrmalities] : arrhythmia/ECG abnormalities [FreeTextEntry1] : INCOMPLETE NOTE [FreeTextEntry3] : Dr. Gipson

## 2023-08-16 NOTE — REVIEW OF SYSTEMS
[Dyspnea on exertion] : dyspnea during exertion [Chest Discomfort] : chest discomfort [Negative] : Psychiatric [Fever] : no fever [Chills] : no chills [Feeling Fatigued] : not feeling fatigued [SOB] : no shortness of breath [Lower Ext Edema] : no extremity edema [Leg Claudication] : no intermittent leg claudication [Palpitations] : no palpitations [Syncope] : no syncope [Dizziness] : no dizziness [Convulsions] : no convulsions [Weakness] : no weakness [Depression] : no depression [Anxiety] : no anxiety [Suicidal] : not suicidal [Easy Bleeding] : no tendency for easy bleeding [Easy Bruising] : no tendency for easy bruising [FreeTextEntry5] : stable BETANCOURT

## 2023-08-16 NOTE — HISTORY OF PRESENT ILLNESS
[FreeTextEntry1] : 43-year-old woman with history of obesity, HLD, hypertrophic cardiomyopathy (HCM) with outflow tract obstruction, NSVT and family history of sudden death s/p S-ICD on 4/20/18, presenting for follow-up of VT/VF and ICD shocks.   Since last visit she has remained on sotalol 80mg bid, and Metoprolol 50mg qPM.   Given her significant periods of anxiety about getting shocked again, associated insomnia and depressed mood, Lexapro was restarted at her last visit.   She reports that she feels much better since with reduced anxiety, improved mood and better sleep.   ECG today demonstrates sinus rhythm at 60 bpm with narrow QRS and QTc 458ms.  Device interrogation today demonstrates normal device function with no recurrent arrhythmias and 37% remaining battery longevity.    Current medications include metoprolol succinate 50mg qPM, sotalol 80mg BID, Lexapro, torsemide, fluticasone, famotidine   To review She underwent S-ICD implant for primary prevention in 4/2018. In 4/2019 she had an episode of ventricular tachycardia and had appropriate defibrillation from her S-ICD. This occurred during sleep, and she was unaware, but it was identified during subsequent device follow-up.    On 6/22/20, she had another episode of polymorphic VT/VF while sleeping, which resulted in a successful ICD shock. The episode did appear to start with a PVC which occurred on top of a T-wave. She was sleeping when the shock occurred.   Given concern for possible PVC-triggered VF, a 3-day MCOT monitor was performed 7/23-7/26/20 and revealed sinus rhythm with avg HR 64 bpm (range ), rare PVCs (<1%) and NSVT up to 6 beats, and brief runs of SVT up to 4 beats (one symptom triggered episode c/w with brief SVT).   In 5/2022 she again had polymorphic VT (2 episodes in a month period). She was started on sotalol. She had QT prolongation with 120mg bid, and this was changed to 80mg bid.    She did well for several months until 2/5/23 when she had another ICD shock while sleeping - she reports remembering breathing heavy while semi-sleeping before the shock. On interrogation of her S-ICD the rhythm appears to be consistent with PVC induced Torsades de Pointes. The QT interval on the preceding rhythm appears to be >50% the RR interval.   Also of note, she reports prior to the recent shock she has had a very irregular and heavy menses, and she also noted that she had irregular menses prior to other ICD shocks. She has been unable to take oral contraceptives in the past, and hysterectomy had been discussed. However, she has recently started having signs of menopause.    Her outflow gradient has increased- she has also been considered for septal ablation/myomectomy, and there has also been concern about the degree of her mitral regurgitation.   Cardiac MRI 3/16/23 demonstrated subtle late gadolinium enhancement along the inferior hinge point of the RV, and septal hypertrophy that could not be accurately quantified due to artifact from the ICD.

## 2023-08-16 NOTE — ADDENDUM
[FreeTextEntry1] : pt feeling well today, with no recent arrhythmia recurrence.  will continue current medication including sotalol 80mg bid and metoprolol.  she has benefited from SSRI initiation.  despite irregular menses, no significant arrhythmia associated with this recently.

## 2023-08-16 NOTE — PHYSICAL EXAM
[General Appearance - Well Developed] : well developed [General Appearance - Well Nourished] : well nourished [General Appearance - In No Acute Distress] : no acute distress [Normal Conjunctiva] : the conjunctiva exhibited no abnormalities [Normal Oral Mucosa] : normal oral mucosa [Normal Oropharynx] : normal oropharynx [Normal Jugular Venous V Waves Present] : normal jugular venous V waves present [] : no respiratory distress [Respiration, Rhythm And Depth] : normal respiratory rhythm and effort [Auscultation Breath Sounds / Voice Sounds] : lungs were clear to auscultation bilaterally [Heart Rate And Rhythm] : heart rate and rhythm were normal [Edema] : no peripheral edema present [Heart Sounds] : normal S1 and S2 [Abdomen Soft] : soft [Abnormal Walk] : normal gait [Nail Clubbing] : no clubbing of the fingernails [Cyanosis, Localized] : no localized cyanosis [Oriented To Time, Place, And Person] : oriented to person, place, and time [Impaired Insight] : insight and judgment were intact [Mood] : the mood was normal [No Anxiety] : not feeling anxious [FreeTextEntry1] : calm and

## 2023-11-02 NOTE — OB RN PATIENT PROFILE - BREASTFEEDING PROVIDES STABLE TEMPERATURE THROUGH SKIN TO SKIN CONTACT
Physical Therapy     Facility/Department: [facility] OR  Initial Assessment  PHYSICAL THERAPY EVALUATION      Marguerite Baker    : 1942  MRN: [id number]   PHYSICIAN:  Kinga Gibbs*  Primary Problem    Patient Active Problem List   Diagnosis    Morbid obesity ([address])    History of left knee replacement    History of left shoulder replacement    Degenerative joint disease (DJD) of lumbar spine    Colon polyps    Nephrolithiasis    Joint pain       Rehabilitation Diagnosis:  L THR   Primary osteoarthritis of left hip [M16.12]       SERVICE DATE: 2023        SUBJECTIVE: Patient states that they are planning on discharging home today    Pain Screening  Patient Currently in Pain: reports minimal pain with activity, nurse present    PRIOR LEVEL OF FUNCTION:    [] Independent in community no assistive device     [x] Independent in community with assistive device     [] Independent in the house with assistive device     [] Transfer only    []     OBJECTIVE:  Orientation: Within functional Limits      ROM - Passive, Non-operative side     [] Left lower extremity  [x] Right lower extremity       Within functional limits    ROM - Passive, operative side    [x] Left lower extremity  [] Right lower extremity      Hip - extension to 0 degrees and flexion to 80 in sitting    Knee - extension to 0 degrees in supine and flexion to 80 in sitting        STRENGTH - Non-operative side    [] Left lower extremity  [x] Right lower extremity       Within functional limits    STRENGTH - operative side    [x] Left lower extremity  [] Right lower extremity    Grossly  3-/5        TRANSFERS   Sit to stand     [] CGA [x] Minimum        Bed to chair     [] CGA [x] Minimum    Bed mobility   Supine to sit      [] CGA [x] Minimum      Scoot  [] Side to side  [] Up and down     [] CGA [] Minimum    AMBULATION   Weight bearing: - WBAT      Distance: 15'     Device: Rolling Walker - The patient has been trained on the use of this Statement Selected

## 2023-11-28 NOTE — OB RN TRIAGE NOTE - NS_NUMBOFVISITS_OBGYN_ALL_OB_NU
Jessenia Brand is a 69year old female presenting with LOWER BILATERAL EMG  Referred by Enedelia Patton MD 20

## 2024-02-11 NOTE — REASON FOR VISIT
[Arrhythmia/ECG Abnorrmalities] : arrhythmia/ECG abnormalities [FreeTextEntry3] : Dr. Gipson [FreeTextEntry1] : incomplete note

## 2024-02-11 NOTE — CARDIOLOGY SUMMARY
[de-identified] : 8/16/23 sinus rhythm 60 bpm with narrow QRS, LVH and diffuse ST/Tw abnormalities, and QTc 458ms 5/17/23 sinus rhythm 68bpm with narrow QRS, LVH and diffuse ST/Tw abnormalities, QTc 448ms.  2/8/23 sinus rhythm 54 bpm, LVH and diffuse ST/Tw abnormalities, QTc 490 ms 11/16/22: sinus rhythm 54bpm, LVH  and diffuse ST/Tw abnormalities (unchanged). QTc 452 ms 6/15/22 sinus rhythm 71 bpm, LVH and diffuse ST/Tw abnormalities (unchanged). QTc 443 ms [de-identified] : TTE 3/15/22 LVEF 70%, LA 3.9cm, mod septal hypertrophy and HCM with outflow obstruction, resting gradient 73mmHg, 130mmHg post valsalva, mild MR, mild TR, RVSP <35  [de-identified] : 6/9/22 mild-mod abnormal, anterolateral ischemia, with infarct of anterolateral wall [de-identified] : Cardiac MRI 3/16/23:subtle late gadolinium enhancement along the inferior hinge point of the RV, and septal hypertrophy that could not be accurately quantified due to artifact from ICD.  [___] : [unfilled]

## 2024-02-11 NOTE — PHYSICAL EXAM
[General Appearance - Well Nourished] : well nourished [General Appearance - Well Developed] : well developed [Normal Conjunctiva] : the conjunctiva exhibited no abnormalities [General Appearance - In No Acute Distress] : no acute distress [Normal Oropharynx] : normal oropharynx [Normal Oral Mucosa] : normal oral mucosa [Respiration, Rhythm And Depth] : normal respiratory rhythm and effort [Normal Jugular Venous V Waves Present] : normal jugular venous V waves present [] : no respiratory distress [Auscultation Breath Sounds / Voice Sounds] : lungs were clear to auscultation bilaterally [Heart Rate And Rhythm] : heart rate and rhythm were normal [Edema] : no peripheral edema present [Heart Sounds] : normal S1 and S2 [Abdomen Soft] : soft [Abnormal Walk] : normal gait [Nail Clubbing] : no clubbing of the fingernails [Cyanosis, Localized] : no localized cyanosis [Oriented To Time, Place, And Person] : oriented to person, place, and time [Impaired Insight] : insight and judgment were intact [No Anxiety] : not feeling anxious [Mood] : the mood was normal [FreeTextEntry1] : calm and

## 2024-02-11 NOTE — DISCUSSION/SUMMARY
[FreeTextEntry1] : 43-year-old woman with history of obesity, HLD, hypertrophic cardiomyopathy (HCM) with outflow tract obstruction, NSVT and family history of sudden death s/p S-ICD on 4/20/18, presenting for follow-up of VT/VF and ICD shocks.   She most recently had an episode of VF in 2/2023, c/w PVC induced Torsades de Pointes, which was successfully treated by her ICD. This was her first event and ICD shock since she was started on sotalol in 5/2022. She was noted to have a prolonged QT in the setting of sinus bradycardia (50s bpm); and the suspicion was that lower HRs increased the effect of sotalol (reverse use dependence) resulting in the increased QT. Metoprolol was decreased to 50mg q pm at that time. She has not had recurrent ventricular arrhythmias since, and her sinus rate today is 68bpm with QTc 458ms.    If further events do occur in the future, atrial pacing may be need to prevent bradycardia, and she may ultimately benefit from transition from an S-ICD to a dual chamber transvenous ICD. A thoracic sympathectomy could also be considered for recurrent arrhythmias.    In addition, there is a possibility that hormonal changes related to her increasingly irregular menses and menstrual cycle may be contributing to her arrhythmic vulnerability, and she has noted a pattern of her VT/VF occurring around her cycle. Her menses has become more sporadic and she suspects she is starting menopause. As her arrhythmias have been under good control, consideration of hysterectomy is deferred at this point.    At her last visit, Lexapro was restarted for treatment of depression and anxiety, likely related (at least in part) to PTSD from recurrent ICD shocks. She has noted substantial improvement in mood, decreased anxiety and improved sleep. QTc has remained stable on concomitant sotalol.   -Continue metoprolol 50mg qPM.    -Continue sotalol 80mg BID.    - Continue Lexapro 10mg daily.   -EP follow up in 6 months; sooner PRN.

## 2024-02-11 NOTE — REVIEW OF SYSTEMS
[Fever] : no fever [Chills] : no chills [Feeling Fatigued] : not feeling fatigued [Dyspnea on exertion] : dyspnea during exertion [SOB] : no shortness of breath [Chest Discomfort] : chest discomfort [Leg Claudication] : no intermittent leg claudication [Lower Ext Edema] : no extremity edema [Palpitations] : no palpitations [Syncope] : no syncope [Dizziness] : no dizziness [Convulsions] : no convulsions [Depression] : no depression [Weakness] : no weakness [Anxiety] : no anxiety [Suicidal] : not suicidal [Easy Bruising] : no tendency for easy bruising [Easy Bleeding] : no tendency for easy bleeding [Negative] : Psychiatric [FreeTextEntry5] : stable BETANCOURT

## 2024-02-14 ENCOUNTER — APPOINTMENT (OUTPATIENT)
Dept: ELECTROPHYSIOLOGY | Facility: CLINIC | Age: 44
End: 2024-02-14

## 2024-02-16 ENCOUNTER — APPOINTMENT (OUTPATIENT)
Dept: OBGYN | Facility: CLINIC | Age: 44
End: 2024-02-16

## 2024-02-16 DIAGNOSIS — Z11.51 ENCOUNTER FOR SCREENING FOR HUMAN PAPILLOMAVIRUS (HPV): ICD-10-CM

## 2024-02-16 DIAGNOSIS — O09.892 SUPERVISION OF OTHER HIGH RISK PREGNANCIES, SECOND TRIMESTER: ICD-10-CM

## 2024-02-16 DIAGNOSIS — O99.413 DISEASES OF THE CIRCULATORY SYSTEM COMPLICATING PREGNANCY, THIRD TRIMESTER: ICD-10-CM

## 2024-02-16 DIAGNOSIS — O99.412 DISEASES OF THE CIRCULATORY SYSTEM COMPLICATING PREGNANCY, SECOND TRIMESTER: ICD-10-CM

## 2024-02-16 DIAGNOSIS — I25.10 DISEASES OF THE CIRCULATORY SYSTEM COMPLICATING PREGNANCY, THIRD TRIMESTER: ICD-10-CM

## 2024-02-16 DIAGNOSIS — Z01.419 ENCOUNTER FOR GYNECOLOGICAL EXAMINATION (GENERAL) (ROUTINE) W/OUT ABNORMAL FINDINGS: ICD-10-CM

## 2024-02-16 DIAGNOSIS — R21 RASH AND OTHER NONSPECIFIC SKIN ERUPTION: ICD-10-CM

## 2024-02-16 DIAGNOSIS — B37.2 CANDIDIASIS OF SKIN AND NAIL: ICD-10-CM

## 2024-02-16 DIAGNOSIS — Z12.4 ENCOUNTER FOR SCREENING FOR MALIGNANT NEOPLASM OF CERVIX: ICD-10-CM

## 2024-02-16 DIAGNOSIS — Z87.898 PERSONAL HISTORY OF OTHER SPECIFIED CONDITIONS: ICD-10-CM

## 2024-02-16 DIAGNOSIS — O34.219 MATERNAL CARE FOR UNSPECIFIED TYPE SCAR FROM PREVIOUS CESAREAN DELIVERY: ICD-10-CM

## 2024-02-16 DIAGNOSIS — Z87.09 PERSONAL HISTORY OF OTHER DISEASES OF THE RESPIRATORY SYSTEM: ICD-10-CM

## 2024-02-16 DIAGNOSIS — Z92.89 PERSONAL HISTORY OF OTHER MEDICAL TREATMENT: ICD-10-CM

## 2024-04-03 ENCOUNTER — APPOINTMENT (OUTPATIENT)
Dept: ELECTROPHYSIOLOGY | Facility: CLINIC | Age: 44
End: 2024-04-03
Payer: MEDICAID

## 2024-04-03 VITALS
SYSTOLIC BLOOD PRESSURE: 110 MMHG | HEIGHT: 59 IN | DIASTOLIC BLOOD PRESSURE: 76 MMHG | BODY MASS INDEX: 35.08 KG/M2 | HEART RATE: 55 BPM | WEIGHT: 174 LBS

## 2024-04-03 PROCEDURE — 99215 OFFICE O/P EST HI 40 MIN: CPT | Mod: 25

## 2024-04-03 PROCEDURE — 93000 ELECTROCARDIOGRAM COMPLETE: CPT

## 2024-04-03 NOTE — PHYSICAL EXAM
[General Appearance - Well Developed] : well developed [General Appearance - Well Nourished] : well nourished [General Appearance - In No Acute Distress] : no acute distress [Normal Conjunctiva] : the conjunctiva exhibited no abnormalities [Normal Oral Mucosa] : normal oral mucosa [Normal Jugular Venous V Waves Present] : normal jugular venous V waves present [Normal Oropharynx] : normal oropharynx [] : no respiratory distress [Respiration, Rhythm And Depth] : normal respiratory rhythm and effort [Auscultation Breath Sounds / Voice Sounds] : lungs were clear to auscultation bilaterally [Heart Rate And Rhythm] : heart rate and rhythm were normal [Edema] : no peripheral edema present [Heart Sounds] : normal S1 and S2 [Abdomen Soft] : soft [Abnormal Walk] : normal gait [Cyanosis, Localized] : no localized cyanosis [Nail Clubbing] : no clubbing of the fingernails [Mood] : the mood was normal [Impaired Insight] : insight and judgment were intact [Oriented To Time, Place, And Person] : oriented to person, place, and time [No Anxiety] : not feeling anxious [FreeTextEntry1] : calm and

## 2024-04-03 NOTE — REVIEW OF SYSTEMS
[Dyspnea on exertion] : dyspnea during exertion [Chest Discomfort] : chest discomfort [Negative] : Psychiatric [Fever] : no fever [Chills] : no chills [Feeling Fatigued] : feeling fatigued [SOB] : no shortness of breath [Lower Ext Edema] : no extremity edema [Leg Claudication] : no intermittent leg claudication [Palpitations] : no palpitations [Dizziness] : no dizziness [Syncope] : no syncope [Convulsions] : no convulsions [Weakness] : no weakness [Depression] : no depression [Anxiety] : no anxiety [Suicidal] : not suicidal [Easy Bleeding] : no tendency for easy bleeding [Easy Bruising] : no tendency for easy bruising [FreeTextEntry5] : stable BETANCOURT

## 2024-04-03 NOTE — CARDIOLOGY SUMMARY
[___] : [unfilled] [de-identified] : 4/3/24 sinus rhythm 55 bpm, LVH, diffuse ST/Tw abnormalities, QTc 500 ms 8/16/23 sinus rhythm 60 bpm with narrow QRS, LVH and diffuse ST/Tw abnormalities, and QTc 458ms 5/17/23 sinus rhythm 68bpm with narrow QRS, LVH and diffuse ST/Tw abnormalities, QTc 448ms.  2/8/23 sinus rhythm 54 bpm, LVH and diffuse ST/Tw abnormalities, QTc 490 ms 11/16/22: sinus rhythm 54bpm, LVH  and diffuse ST/Tw abnormalities (unchanged). QTc 452 ms 6/15/22 sinus rhythm 71 bpm, LVH and diffuse ST/Tw abnormalities (unchanged). QTc 443 ms [de-identified] : 3/21/24 LVEF 53%, outflow tract gradient 82mmHg at rest, 209 mmHg with valsalva, +ANGE, mild TR, RVSP 50 TTE 3/15/22 LVEF 70%, LA 3.9cm, mod septal hypertrophy and HCM with outflow obstruction, resting gradient 73mmHg, 130mmHg post valsalva, mild MR, mild TR, RVSP <35  [de-identified] : 6/9/22 mild-mod abnormal, anterolateral ischemia, with infarct of anterolateral wall [de-identified] : Cardiac MRI 3/16/23:subtle late gadolinium enhancement along the inferior hinge point of the RV, and septal hypertrophy that could not be accurately quantified due to artifact from ICD.

## 2024-04-03 NOTE — DISCUSSION/SUMMARY
[FreeTextEntry1] : 43-year-old woman with history of obesity, HLD, hypertrophic cardiomyopathy (HCM) with outflow tract obstruction, NSVT and family history of sudden death s/p S-ICD on 4/20/18, presenting for follow-up of VT/VF and ICD shocks.   She most recently had an episode of VF in 2/2023, c/w PVC induced Torsades de Pointes, which was successfully treated by her ICD. This was her first event and ICD shock since she was started on sotalol in 5/2022. She was noted to have a prolonged QT in the setting of sinus bradycardia (50s bpm); and the suspicion was that lower HRs increased the effect of sotalol (reverse use dependence) resulting in the increased QT. Metoprolol was decreased to 50mg q pm at that time. She has not had recurrent ventricular arrhythmias since, though recently  she again has sinus bradycardia, and QT interval is around 500 ms again. Lexapro has been very helpful for her, but may also be contributing to QT prolongation.  Given concern for QT prolongation, will lower sotalol to 40mg bid.  She did feel syptomatically better with increased metoprolol, so will trial increase to 100mg qd, along with lowered sotalol dose.  She will return for ECG to reassess QT later this week.  Dr Gipson also considering new HCM medication given progressive outflow obstruction.   If further events do occur in the future, atrial pacing may be need to prevent bradycardia, and she may ultimately benefit from transition from an S-ICD to a dual chamber transvenous ICD. This will likely be planned when her current S-ICD reaches PAUL.   -lower sotalol to 40mg bid for now -increase metoprolol to 100mg qd - Continue Lexapro 10mg daily.  -cardiology followup with Dr Gipson later this week. To reassess ECG, and consider further med adjustment. -EP follow up in 3 months; sooner PRN.   -eventual transition to dual chamber transvenous ICD likely needed to allow atrial pacing support, and reduce risk of pause related VT/VF [EKG obtained to assist in diagnosis and management of assessed problem(s)] : EKG obtained to assist in diagnosis and management of assessed problem(s)

## 2024-04-10 NOTE — OB RN PATIENT PROFILE - NS PRO AD NO ADVANCE DIRECTIVE
Patient Education    [employer] HANDOUT- PATIENT  9 YEAR VISIT  Here are some suggestions from [employer] experts that may be of value to your family.     TAKING CARE OF YOU  If you get angry with someone, try to walk away.  Don t try cigarettes or e-cigarettes. They are bad for you. Walk away if someone offers you one.  Talk with us if you are worried about alcohol or drug use in your family.  Go online only when your parents say it s OK. Don t give your name, address, or phone number on a Web site unless your parents say it s OK.  If you want to chat online, tell your parents first.  If you feel scared online, get off and tell your parents.  Enjoy spending time with your family. Help out at home.    EATING WELL AND BEING ACTIVE  Brush your teeth at least twice each day, morning and night.  Floss your teeth every day.  Wear a mouth guard when playing sports.  Eat breakfast every day.  Be a healthy eater. It helps you do well in school and sports.  Have vegetables, fruits, lean protein, and whole grains at meals and snacks.  Eat when you re hungry. Stop when you feel satisfied.  Eat with your family often.  If you drink fruit juice, drink only 1 cup of 100% fruit juice a day.  Limit high-fat foods and drinks such as candies, snacks, fast food, and soft drinks.  Have healthy snacks such as fruit, cheese, and yogurt.  Drink at least 3 glasses of milk daily.  Turn off the TV, tablet, or computer. Get up and play instead.  Go out and play several times a day.    HANDLING FEELINGS  Talk about your worries. It helps.  Talk about feeling mad or sad with someone who you trust and listens well.  Ask your parent or another trusted adult about changes in your body.  Even questions that feel embarrassing are important. It s OK to talk about your body and how it s changing.    DOING WELL AT SCHOOL  Try to do your best at school. Doing well in school helps you feel good about yourself.  Ask for help when you need  it.  Find clubs and teams to join.  Tell kids who pick on you or try to hurt you to stop. Then walk away.  Tell adults you trust about bullies.  PLAYING IT SAFE  Make sure you re always buckled into your booster seat and ride in the back seat of the car. That is where you are safest.  Wear your helmet and safety gear when riding scooters, biking, skating, in-line skating, skiing, snowboarding, and horseback riding.  Ask your parents about learning to swim. Never swim without an adult nearby.  Always wear sunscreen and a hat when you re outside. Try not to be outside for too long between 11:00 am and 3:00 pm, when it s easy to get a sunburn.  Don t open the door to anyone you don t know.  Have friends over only when your parents say it s OK.  Ask a grown-up for help if you are scared or worried.  It is OK to ask to go home from a friend s house and be with your mom or dad.  Keep your private parts (the parts of your body covered by a bathing suit) covered.  Tell your parent or another grown-up right away if an older child or a grown-up  Shows you his or her private parts.  Asks you to show him or her yours.  Touches your private parts.  Scares you or asks you not to tell your parents.  If that person does any of these things, get away as soon as you can and tell your parent or another adult you trust.  If you see a gun, don t touch it. Tell your parents right away.        Consistent with Bright Futures: Guidelines for Health Supervision of Infants, Children, and Adolescents, 4th Edition  For more information, go to https://brightfutures.aap.org.             Patient Education    BRIGHT FUTURES HANDOUT- PARENT  9 YEAR VISIT  Here are some suggestions from [employer] experts that may be of value to your family.     HOW YOUR FAMILY IS DOING  Encourage your child to be independent and responsible. Hug and praise her.  Spend time with your child. Get to know her friends and their families.  Take pride in your child for  good behavior and doing well in school.  Help your child deal with conflict.  If you are worried about your living or food situation, talk with us. Community agencies and programs such as SNAP can also provide information and assistance.  Don t smoke or use e-cigarettes. Keep your home and car smoke-free. Tobacco-free spaces keep children healthy.  Don t use alcohol or drugs. If you re worried about a family member s use, let us know, or reach out to local or online resources that can help.  Put the family computer in a central place.  Know who your child talks with online.  Install a safety filter.    STAYING HEALTHY  Take your child to the dentist twice a year.  Give a fluoride supplement if the dentist recommends it.  Help your child brush her teeth twice a day  After breakfast  Before bed  Use a pea-sized amount of toothpaste with fluoride.  Help your child floss her teeth once a day.  Encourage your child to always wear a mouth guard to protect her teeth while playing sports.  Encourage healthy eating by  Eating together often as a family  Serving vegetables, fruits, whole grains, lean protein, and low-fat or fat-free dairy  Limiting sugars, salt, and low-nutrient foods  Limit screen time to 2 hours (not counting schoolwork).  Don t put a TV or computer in your child s bedroom.  Consider making a family media use plan. It helps you make rules for media use and balance screen time with other activities, including exercise.  Encourage your child to play actively for at least 1 hour daily.    YOUR GROWING CHILD  Give your child chores to do and expect them to be done.  Be a good role model.  Don t hit or allow others to hit.  Help your child do things for himself.  Teach your child to help others.  Discuss rules and consequences with your child.  Be aware of puberty and changes in your child s body.  Use simple responses to answer your child s questions.  Talk with your child about what worries  him.    SCHOOL  Help your child get ready for school. Use the following strategies:  Create bedtime routines so he gets 10 to 11 hours of sleep.  Offer him a healthy breakfast every morning.  Attend back-to-school night, parent-teacher events, and as many other school events as possible.  Talk with your child and child s teacher about bullies.  Talk with your child s teacher if you think your child might need extra help or tutoring.  Know that your child s teacher can help with evaluations for special help, if your child is not doing well in school.    SAFETY  The back seat is the safest place to ride in a car until your child is 13 years old.  Your child should use a belt-positioning booster seat until the vehicle s lap and shoulder belts fit.  Teach your child to swim and watch her in the water.  Use a hat, sun protection clothing, and sunscreen with SPF of 15 or higher on her exposed skin. Limit time outside when the sun is strongest (11:00 am-3:00 pm).  Provide a properly fitting helmet and safety gear for riding scooters, biking, skating, in-line skating, skiing, snowboarding, and horseback riding.  If it is necessary to keep a gun in your home, store it unloaded and locked with the ammunition locked separately from the gun.  Teach your child plans for emergencies such as a fire. Teach your child how and when to dial 911.  Teach your child how to be safe with other adults.  No adult should ask a child to keep secrets from parents.  No adult should ask to see a child s private parts.  No adult should ask a child for help with the adult s own private parts.        Helpful Resources:  Family Media Use Plan: www.healthychildren.org/MediaUsePlan  Smoking Quit Line: 672.875.6408 Information About Car Safety Seats: www.safercar.gov/parents  Toll-free Auto Safety Hotline: 124.551.4240  Consistent with Bright Futures: Guidelines for Health Supervision of Infants, Children, and Adolescents, 4th Edition  For more  information, go to https://brightfutures.aap.org.              No

## 2024-07-10 ENCOUNTER — APPOINTMENT (OUTPATIENT)
Dept: ELECTROPHYSIOLOGY | Facility: CLINIC | Age: 44
End: 2024-07-10
Payer: COMMERCIAL

## 2024-07-10 VITALS
BODY MASS INDEX: 34.88 KG/M2 | HEART RATE: 60 BPM | WEIGHT: 173 LBS | DIASTOLIC BLOOD PRESSURE: 84 MMHG | RESPIRATION RATE: 18 BRPM | HEIGHT: 59 IN | SYSTOLIC BLOOD PRESSURE: 142 MMHG

## 2024-07-10 PROCEDURE — 99215 OFFICE O/P EST HI 40 MIN: CPT | Mod: 25

## 2024-07-10 PROCEDURE — 93000 ELECTROCARDIOGRAM COMPLETE: CPT | Mod: 59

## 2024-10-08 NOTE — OB PROVIDER H&P - NS_CSECTION_OBGYN_ALL_OB_NU
10/08/24 1144   Group Therapy Session   Group Attendance attended group session   Time Session Began 1030   Time Session Ended 1100   Total Time (minutes) 30   Group Type psychotherapeutic   Group Topic Covered cognitive therapy techniques;cognitive activities   Literature/Videos Given topic handouts   Literature/Videos Given Comments Protective Factors   Group Session Detail Patients participated in working on a Protective Factors worksheet which includes both a psychoeducation and an interactive component. The psychoeducation portion describes what protective factors are, along with several practical examples. In the interactive component, patients will identify their strengths and weaknesses, and then describe how they would like to improve upon their protective factors.   Patient Participation/Contribution cooperative with task   Patient Participation Detail Literature provided to be completed independently.   Affect During Group affect consistent with mood   Degree of Insight moderate        1

## 2025-01-22 ENCOUNTER — APPOINTMENT (OUTPATIENT)
Dept: ELECTROPHYSIOLOGY | Facility: CLINIC | Age: 45
End: 2025-01-22
Payer: COMMERCIAL

## 2025-01-22 VITALS
HEIGHT: 59 IN | SYSTOLIC BLOOD PRESSURE: 118 MMHG | BODY MASS INDEX: 35.88 KG/M2 | DIASTOLIC BLOOD PRESSURE: 82 MMHG | HEART RATE: 60 BPM | WEIGHT: 178 LBS

## 2025-01-22 PROCEDURE — G2211 COMPLEX E/M VISIT ADD ON: CPT | Mod: NC

## 2025-01-22 PROCEDURE — 99215 OFFICE O/P EST HI 40 MIN: CPT

## 2025-01-22 PROCEDURE — 93000 ELECTROCARDIOGRAM COMPLETE: CPT

## 2025-02-12 ENCOUNTER — EMERGENCY (EMERGENCY)
Facility: HOSPITAL | Age: 45
LOS: 1 days | Discharge: HOME | End: 2025-02-12
Attending: STUDENT IN AN ORGANIZED HEALTH CARE EDUCATION/TRAINING PROGRAM
Payer: COMMERCIAL

## 2025-02-12 VITALS
HEART RATE: 68 BPM | DIASTOLIC BLOOD PRESSURE: 62 MMHG | RESPIRATION RATE: 18 BRPM | SYSTOLIC BLOOD PRESSURE: 102 MMHG | OXYGEN SATURATION: 99 %

## 2025-02-12 VITALS
DIASTOLIC BLOOD PRESSURE: 59 MMHG | HEART RATE: 86 BPM | TEMPERATURE: 98 F | OXYGEN SATURATION: 92 % | WEIGHT: 173.94 LBS | RESPIRATION RATE: 17 BRPM | SYSTOLIC BLOOD PRESSURE: 92 MMHG

## 2025-02-12 DIAGNOSIS — Z95.0 PRESENCE OF CARDIAC PACEMAKER: Chronic | ICD-10-CM

## 2025-02-12 DIAGNOSIS — Z98.891 HISTORY OF UTERINE SCAR FROM PREVIOUS SURGERY: Chronic | ICD-10-CM

## 2025-02-12 LAB
ALBUMIN SERPL ELPH-MCNC: 3.3 G/DL — SIGNIFICANT CHANGE UP (ref 3.3–5.2)
ALP SERPL-CCNC: 56 U/L — SIGNIFICANT CHANGE UP (ref 40–120)
ALT FLD-CCNC: 17 U/L — SIGNIFICANT CHANGE UP
ANION GAP SERPL CALC-SCNC: 14 MMOL/L — SIGNIFICANT CHANGE UP (ref 5–17)
AST SERPL-CCNC: 30 U/L — SIGNIFICANT CHANGE UP
BASOPHILS # BLD AUTO: 0.02 K/UL — SIGNIFICANT CHANGE UP (ref 0–0.2)
BASOPHILS NFR BLD AUTO: 0.4 % — SIGNIFICANT CHANGE UP (ref 0–2)
BILIRUB SERPL-MCNC: 0.4 MG/DL — SIGNIFICANT CHANGE UP (ref 0.4–2)
BUN SERPL-MCNC: 14.1 MG/DL — SIGNIFICANT CHANGE UP (ref 8–20)
CALCIUM SERPL-MCNC: 8 MG/DL — LOW (ref 8.4–10.5)
CHLORIDE SERPL-SCNC: 100 MMOL/L — SIGNIFICANT CHANGE UP (ref 96–108)
CO2 SERPL-SCNC: 22 MMOL/L — SIGNIFICANT CHANGE UP (ref 22–29)
CREAT SERPL-MCNC: 0.6 MG/DL — SIGNIFICANT CHANGE UP (ref 0.5–1.3)
EGFR: 114 ML/MIN/1.73M2 — SIGNIFICANT CHANGE UP
EOSINOPHIL # BLD AUTO: 0.04 K/UL — SIGNIFICANT CHANGE UP (ref 0–0.5)
EOSINOPHIL NFR BLD AUTO: 0.7 % — SIGNIFICANT CHANGE UP (ref 0–6)
FLUAV AG NPH QL: DETECTED
FLUBV AG NPH QL: SIGNIFICANT CHANGE UP
GLUCOSE SERPL-MCNC: 86 MG/DL — SIGNIFICANT CHANGE UP (ref 70–99)
HCG SERPL-ACNC: <4 MIU/ML — SIGNIFICANT CHANGE UP
HCT VFR BLD CALC: 38.7 % — SIGNIFICANT CHANGE UP (ref 34.5–45)
HGB BLD-MCNC: 13 G/DL — SIGNIFICANT CHANGE UP (ref 11.5–15.5)
IMM GRANULOCYTES # BLD AUTO: 0.02 K/UL — SIGNIFICANT CHANGE UP (ref 0–0.07)
IMM GRANULOCYTES NFR BLD AUTO: 0.4 % — SIGNIFICANT CHANGE UP (ref 0–0.9)
LYMPHOCYTES # BLD AUTO: 0.97 K/UL — LOW (ref 1–3.3)
LYMPHOCYTES NFR BLD AUTO: 17.4 % — SIGNIFICANT CHANGE UP (ref 13–44)
MCHC RBC-ENTMCNC: 29.7 PG — SIGNIFICANT CHANGE UP (ref 27–34)
MCHC RBC-ENTMCNC: 33.6 G/DL — SIGNIFICANT CHANGE UP (ref 32–36)
MCV RBC AUTO: 88.4 FL — SIGNIFICANT CHANGE UP (ref 80–100)
MONOCYTES # BLD AUTO: 0.65 K/UL — SIGNIFICANT CHANGE UP (ref 0–0.9)
MONOCYTES NFR BLD AUTO: 11.7 % — SIGNIFICANT CHANGE UP (ref 2–14)
NEUTROPHILS # BLD AUTO: 3.86 K/UL — SIGNIFICANT CHANGE UP (ref 1.8–7.4)
NEUTROPHILS NFR BLD AUTO: 69.4 % — SIGNIFICANT CHANGE UP (ref 43–77)
NRBC # BLD AUTO: 0 K/UL — SIGNIFICANT CHANGE UP (ref 0–0)
NRBC # FLD: 0 K/UL — SIGNIFICANT CHANGE UP (ref 0–0)
NRBC BLD AUTO-RTO: 0 /100 WBCS — SIGNIFICANT CHANGE UP (ref 0–0)
PLATELET # BLD AUTO: 128 K/UL — LOW (ref 150–400)
PMV BLD: 11.3 FL — SIGNIFICANT CHANGE UP (ref 7–13)
POTASSIUM SERPL-MCNC: 3.6 MMOL/L — SIGNIFICANT CHANGE UP (ref 3.5–5.3)
POTASSIUM SERPL-SCNC: 3.6 MMOL/L — SIGNIFICANT CHANGE UP (ref 3.5–5.3)
PROT SERPL-MCNC: 6.9 G/DL — SIGNIFICANT CHANGE UP (ref 6.6–8.7)
RBC # BLD: 4.38 M/UL — SIGNIFICANT CHANGE UP (ref 3.8–5.2)
RBC # FLD: 14.5 % — SIGNIFICANT CHANGE UP (ref 10.3–14.5)
RSV RNA NPH QL NAA+NON-PROBE: SIGNIFICANT CHANGE UP
SARS-COV-2 RNA SPEC QL NAA+PROBE: SIGNIFICANT CHANGE UP
SODIUM SERPL-SCNC: 136 MMOL/L — SIGNIFICANT CHANGE UP (ref 135–145)
WBC # BLD: 5.56 K/UL — SIGNIFICANT CHANGE UP (ref 3.8–10.5)
WBC # FLD AUTO: 5.56 K/UL — SIGNIFICANT CHANGE UP (ref 3.8–10.5)

## 2025-02-12 PROCEDURE — 93010 ELECTROCARDIOGRAM REPORT: CPT

## 2025-02-12 PROCEDURE — 71046 X-RAY EXAM CHEST 2 VIEWS: CPT

## 2025-02-12 PROCEDURE — 84702 CHORIONIC GONADOTROPIN TEST: CPT

## 2025-02-12 PROCEDURE — 85025 COMPLETE CBC W/AUTO DIFF WBC: CPT

## 2025-02-12 PROCEDURE — 93005 ELECTROCARDIOGRAM TRACING: CPT

## 2025-02-12 PROCEDURE — 99285 EMERGENCY DEPT VISIT HI MDM: CPT | Mod: 25

## 2025-02-12 PROCEDURE — 99284 EMERGENCY DEPT VISIT MOD MDM: CPT

## 2025-02-12 PROCEDURE — 80053 COMPREHEN METABOLIC PANEL: CPT

## 2025-02-12 PROCEDURE — 36415 COLL VENOUS BLD VENIPUNCTURE: CPT

## 2025-02-12 PROCEDURE — 71046 X-RAY EXAM CHEST 2 VIEWS: CPT | Mod: 26

## 2025-02-12 PROCEDURE — 87637 SARSCOV2&INF A&B&RSV AMP PRB: CPT

## 2025-02-12 PROCEDURE — 96374 THER/PROPH/DIAG INJ IV PUSH: CPT

## 2025-02-12 RX ORDER — ACETAMINOPHEN 160 MG/5ML
1000 SUSPENSION ORAL ONCE
Refills: 0 | Status: COMPLETED | OUTPATIENT
Start: 2025-02-12 | End: 2025-02-12

## 2025-02-12 RX ORDER — BACTERIOSTATIC SODIUM CHLORIDE 0.9 %
1000 VIAL (ML) INJECTION ONCE
Refills: 0 | Status: COMPLETED | OUTPATIENT
Start: 2025-02-12 | End: 2025-02-12

## 2025-02-12 RX ORDER — AMOXICILLIN AND CLAVULANATE POTASSIUM 200; 28.5 MG/5ML; MG/5ML
1 POWDER, FOR SUSPENSION ORAL
Qty: 20 | Refills: 0
Start: 2025-02-12 | End: 2025-02-21

## 2025-02-12 RX ORDER — AMOXICILLIN AND CLAVULANATE POTASSIUM 200; 28.5 MG/5ML; MG/5ML
1 POWDER, FOR SUSPENSION ORAL ONCE
Refills: 0 | Status: COMPLETED | OUTPATIENT
Start: 2025-02-12 | End: 2025-02-12

## 2025-02-12 RX ADMIN — Medication 1000 MILLILITER(S): at 17:41

## 2025-02-12 RX ADMIN — AMOXICILLIN AND CLAVULANATE POTASSIUM 1 TABLET(S): 200; 28.5 POWDER, FOR SUSPENSION ORAL at 21:26

## 2025-02-12 RX ADMIN — ACETAMINOPHEN 400 MILLIGRAM(S): 160 SUSPENSION ORAL at 18:17

## 2025-02-12 RX ADMIN — Medication 1000 MILLILITER(S): at 19:40

## 2025-02-12 NOTE — ED ADULT NURSE NOTE - OBJECTIVE STATEMENT
Pt A&Ox4, resp wnl. Pt presents to the ED with 3x SOB, chest tightness and difficulty breathing. Pt was recently seen at  and given cough suppressants without relief of symptoms. Pt has PMHx of hypertrophic cardiomyopathy and has a defibrillator in place. Pt saw cardiologist a couple weeks ago for defibrillator interrogation. Pt denies abdominal pain, back pain, HA, lightheadedness, AC use. Pt on 2L O2 NC. VSS.

## 2025-02-12 NOTE — ED PROVIDER NOTE - CLINICAL SUMMARY MEDICAL DECISION MAKING FREE TEXT BOX
HPI: 44-year-old female past medical history of HOCM presents complaining of difficulty breathing with associated dry cough for the last 3 days.  No relief with Tessalon Perles.  Denies any worsening edema from baseline.  Endorsing body aches and tactile fever.  No Tylenol or ibuprofen taken.  No other current complaints this time.    ROS:   General: No fever, no chills, no malaise, no fatigue  ENT: No earache, no coryza, no sore throat  Neck: No stiffness, no swollen glands, no dysphagia  Respiratory: + Cough, + dyspnea, no pleuritic chest pain  Cardiac: See HPI  Musculoskeletal: No myalgia, no arthralgia  Neurologic: No headache, no vertigo, no paresthesia, no focal deficits  Skin: No rash, no evidence of trauma  All other ROS are negative    PE:  General: A&O x3   Head: NC/AT  Eyes: PERRL, EOMI  ENT: Airway patent, mmm.  Pulmonary: CTA b/l, symmetric breath sounds. No W/R/R.  Cardiac: s1s2, RRR, no M,G,R, No JVD  Back: Normal  spine  Extremities: FROM, symmetric pulses, capillary refill < 2 seconds, trace bilateral pedal nonpitting edema, 5/5 strength in b/l UE and LE  Skin: no rash or bruising  Neurologic: alert, speech clear, no focal deficits    MDM: 44-year-old female past medical history of HOCM presents complaining of difficulty breathing with associated dry cough for the last 3 days.  Likely viral illness.  Will obtain CBC, CMP, RVP.  Consider PNA, obtain CXR.  Independent review of EKG shows NSR without STEMI or T wave changes, poor R wave progression consistent with LVH, 80 bpm, , QRS 82, QTc 525.  Prolonged QTc.  Will provide symptomatic control as needed.  O2 therapy given for patient comfort.  No other evidence of fluid overload, low current suspicion for new onset heart failure.  Will correlate with CXR.  Disposition pending results.

## 2025-02-12 NOTE — ED ADULT TRIAGE NOTE - CHIEF COMPLAINT QUOTE
pt presents c/o SOB x3d, went to  yesterday & was given tessalon peals w/no relief. hx of hypertrophic obstructive cardiomyopathy & defibrillator

## 2025-02-12 NOTE — ED PROVIDER NOTE - NSFOLLOWUPINSTRUCTIONS_ED_ALL_ED_FT
You were seen and evaluated in the emergency department today for your symptoms.  Your symptoms are consistent with pneumonia.  Given your worsening symptoms you have been given an antibiotic to prevent pneumonia.  Please take the antibiotic to completion.    Please follow with your primary care doctor within 2 days to discuss your visit here today.    Please return to the emergency department for any new or concerning symptoms including but not limited to fever, chills, chest pain, difficulty breathing, weakness, numbness.

## 2025-02-12 NOTE — ED PROVIDER NOTE - PATIENT PORTAL LINK FT
You can access the FollowMyHealth Patient Portal offered by [facility] by registering at the following website: [email or web address]. By joining [employer]’s FollowMyHealth portal, you will also be able to view your health information using other applications (apps) compatible with our system.

## 2025-02-15 DIAGNOSIS — R06.02 SHORTNESS OF BREATH: ICD-10-CM

## 2025-02-15 DIAGNOSIS — J12.9 VIRAL PNEUMONIA, UNSPECIFIED: ICD-10-CM

## 2025-02-15 DIAGNOSIS — Z88.2 ALLERGY STATUS TO SULFONAMIDES: ICD-10-CM

## 2025-02-15 DIAGNOSIS — I42.1 OBSTRUCTIVE HYPERTROPHIC CARDIOMYOPATHY: ICD-10-CM

## 2025-02-15 DIAGNOSIS — Z88.5 ALLERGY STATUS TO NARCOTIC AGENT: ICD-10-CM

## 2025-02-15 DIAGNOSIS — Z88.8 ALLERGY STATUS TO OTHER DRUGS, MEDICAMENTS AND BIOLOGICAL SUBSTANCES: ICD-10-CM

## 2025-07-02 ENCOUNTER — APPOINTMENT (OUTPATIENT)
Dept: ELECTROPHYSIOLOGY | Facility: CLINIC | Age: 45
End: 2025-07-02
Payer: COMMERCIAL

## 2025-07-02 VITALS
HEART RATE: 61 BPM | WEIGHT: 180 LBS | SYSTOLIC BLOOD PRESSURE: 130 MMHG | DIASTOLIC BLOOD PRESSURE: 84 MMHG | BODY MASS INDEX: 36.29 KG/M2 | HEIGHT: 59 IN

## 2025-07-02 PROCEDURE — 93000 ELECTROCARDIOGRAM COMPLETE: CPT

## 2025-07-02 PROCEDURE — 99215 OFFICE O/P EST HI 40 MIN: CPT | Mod: 25
